# Patient Record
Sex: FEMALE | Race: WHITE | Employment: FULL TIME | URBAN - METROPOLITAN AREA
[De-identification: names, ages, dates, MRNs, and addresses within clinical notes are randomized per-mention and may not be internally consistent; named-entity substitution may affect disease eponyms.]

---

## 2018-08-13 ENCOUNTER — HOSPITAL ENCOUNTER (OUTPATIENT)
Dept: MAMMOGRAPHY | Age: 58
Discharge: HOME | End: 2018-08-13
Attending: INTERNAL MEDICINE
Payer: COMMERCIAL

## 2018-08-13 ENCOUNTER — HOSPITAL ENCOUNTER (OUTPATIENT)
Dept: BONE DENSITY | Age: 58
Discharge: HOME | End: 2018-08-13
Attending: INTERNAL MEDICINE
Payer: COMMERCIAL

## 2018-08-13 DIAGNOSIS — M85.80 OSTEOPENIA: ICD-10-CM

## 2018-08-13 DIAGNOSIS — Z12.39 SCREENING BREAST EXAMINATION: ICD-10-CM

## 2018-08-13 PROCEDURE — 77067 SCR MAMMO BI INCL CAD: CPT

## 2018-08-13 PROCEDURE — 77080 DXA BONE DENSITY AXIAL: CPT

## 2019-11-13 ENCOUNTER — HOSPITAL ENCOUNTER (OUTPATIENT)
Dept: MAMMOGRAPHY | Age: 59
Discharge: HOME | End: 2019-11-13
Payer: COMMERCIAL

## 2019-11-13 DIAGNOSIS — Z12.31 VISIT FOR SCREENING MAMMOGRAM: ICD-10-CM

## 2019-11-13 PROCEDURE — 77067 SCR MAMMO BI INCL CAD: CPT

## 2021-01-20 ENCOUNTER — TRANSCRIBE ORDER (OUTPATIENT)
Dept: SCHEDULING | Age: 61
End: 2021-01-20

## 2021-01-20 DIAGNOSIS — Z12.31 VISIT FOR SCREENING MAMMOGRAM: Primary | ICD-10-CM

## 2021-02-17 ENCOUNTER — HOSPITAL ENCOUNTER (OUTPATIENT)
Dept: MAMMOGRAPHY | Age: 61
Discharge: HOME | End: 2021-02-17
Payer: COMMERCIAL

## 2021-02-17 DIAGNOSIS — Z12.31 VISIT FOR SCREENING MAMMOGRAM: ICD-10-CM

## 2021-02-17 PROCEDURE — 77067 SCR MAMMO BI INCL CAD: CPT | Performed by: INTERNAL MEDICINE

## 2022-02-18 ENCOUNTER — TRANSCRIBE ORDER (OUTPATIENT)
Dept: SCHEDULING | Age: 62
End: 2022-02-18

## 2022-02-18 DIAGNOSIS — Z12.31 OTHER SCREENING MAMMOGRAM: Primary | ICD-10-CM

## 2022-04-12 ENCOUNTER — HOSPITAL ENCOUNTER (OUTPATIENT)
Dept: MAMMOGRAPHY | Age: 62
Discharge: HOME | End: 2022-04-12
Attending: INTERNAL MEDICINE
Payer: COMMERCIAL

## 2022-04-12 DIAGNOSIS — Z12.31 OTHER SCREENING MAMMOGRAM: ICD-10-CM

## 2022-04-12 PROCEDURE — 77063 BREAST TOMOSYNTHESIS BI: CPT

## 2023-04-18 ENCOUNTER — TRANSCRIBE ORDER (OUTPATIENT)
Dept: SCHEDULING | Age: 63
End: 2023-04-18

## 2023-04-18 DIAGNOSIS — Z12.31 VISIT FOR SCREENING MAMMOGRAM: Primary | ICD-10-CM

## 2023-04-20 ENCOUNTER — HOSPITAL ENCOUNTER (OUTPATIENT)
Dept: MAMMOGRAPHY | Age: 63
Discharge: HOME | End: 2023-04-20
Attending: INTERNAL MEDICINE
Payer: COMMERCIAL

## 2023-04-20 DIAGNOSIS — Z12.31 VISIT FOR SCREENING MAMMOGRAM: ICD-10-CM

## 2023-04-20 PROCEDURE — 77063 BREAST TOMOSYNTHESIS BI: CPT

## 2023-04-23 DIAGNOSIS — Z12.31 VISIT FOR SCREENING MAMMOGRAM: Primary | ICD-10-CM

## 2023-05-17 RX ORDER — MECLIZINE HYDROCHLORIDE 25 MG/1
25 TABLET ORAL 3 TIMES DAILY PRN
COMMUNITY
Start: 2016-09-06

## 2024-05-30 ENCOUNTER — TRANSCRIBE ORDERS (OUTPATIENT)
Dept: SCHEDULING | Age: 64
End: 2024-05-30

## 2024-05-30 DIAGNOSIS — Z12.31 SCREENING MAMMOGRAM, ENCOUNTER FOR: Primary | ICD-10-CM

## 2024-06-20 ENCOUNTER — HOSPITAL ENCOUNTER (OUTPATIENT)
Age: 64
Discharge: HOME | End: 2024-06-20
Payer: COMMERCIAL

## 2024-06-20 VITALS — BODY MASS INDEX: 24.29 KG/M2 | WEIGHT: 132 LBS | HEIGHT: 62 IN

## 2024-06-20 DIAGNOSIS — Z12.31 SCREENING MAMMOGRAM, ENCOUNTER FOR: ICD-10-CM

## 2024-06-20 PROCEDURE — 77067 SCR MAMMO BI INCL CAD: CPT

## 2024-07-02 ENCOUNTER — HOSPITAL ENCOUNTER (OUTPATIENT)
Facility: HOSPITAL | Age: 64
Discharge: HOME | End: 2024-07-05
Attending: FAMILY MEDICINE
Payer: COMMERCIAL

## 2024-07-02 ENCOUNTER — APPOINTMENT (OUTPATIENT)
Facility: HOSPITAL | Age: 64
DRG: 021 | End: 2024-07-02
Payer: COMMERCIAL

## 2024-07-02 ENCOUNTER — TRANSCRIBE ORDERS (OUTPATIENT)
Facility: HOSPITAL | Age: 64
End: 2024-07-02

## 2024-07-02 ENCOUNTER — HOSPITAL ENCOUNTER (INPATIENT)
Facility: HOSPITAL | Age: 64
LOS: 14 days | Discharge: HOME HEALTH | DRG: 021 | End: 2024-07-16
Attending: EMERGENCY MEDICINE | Admitting: INTERNAL MEDICINE
Payer: COMMERCIAL

## 2024-07-02 DIAGNOSIS — R03.0 ELEVATED BLOOD PRESSURE READING: ICD-10-CM

## 2024-07-02 DIAGNOSIS — I60.8 SUBARACHNOID HEMORRHAGE DUE TO RUPTURED ANEURYSM (HCC): ICD-10-CM

## 2024-07-02 DIAGNOSIS — G44.89 OTHER HEADACHE SYNDROME: Primary | ICD-10-CM

## 2024-07-02 DIAGNOSIS — I60.9 SAH (SUBARACHNOID HEMORRHAGE) (HCC): Primary | ICD-10-CM

## 2024-07-02 DIAGNOSIS — I16.1 HYPERTENSIVE EMERGENCY: ICD-10-CM

## 2024-07-02 DIAGNOSIS — G44.89 OTHER HEADACHE SYNDROME: ICD-10-CM

## 2024-07-02 LAB
ALBUMIN SERPL-MCNC: 4.2 G/DL (ref 3.5–5)
ALBUMIN/GLOB SERPL: 1.1 (ref 1.1–2.2)
ALP SERPL-CCNC: 100 U/L (ref 45–117)
ALT SERPL-CCNC: 19 U/L (ref 12–78)
ANION GAP SERPL CALC-SCNC: 11 MMOL/L (ref 5–15)
AST SERPL-CCNC: 18 U/L (ref 15–37)
BASOPHILS # BLD: 0 K/UL (ref 0–0.1)
BASOPHILS NFR BLD: 0 % (ref 0–1)
BILIRUB SERPL-MCNC: 0.5 MG/DL (ref 0.2–1)
BUN SERPL-MCNC: 13 MG/DL (ref 6–20)
BUN/CREAT SERPL: 16 (ref 12–20)
CALCIUM SERPL-MCNC: 9.3 MG/DL (ref 8.5–10.1)
CHLORIDE SERPL-SCNC: 91 MMOL/L (ref 97–108)
CO2 SERPL-SCNC: 28 MMOL/L (ref 21–32)
CREAT SERPL-MCNC: 0.81 MG/DL (ref 0.55–1.02)
DIFFERENTIAL METHOD BLD: NORMAL
EKG ATRIAL RATE: 93 BPM
EKG ATRIAL RATE: 94 BPM
EKG DIAGNOSIS: NORMAL
EKG DIAGNOSIS: NORMAL
EKG P AXIS: 37 DEGREES
EKG P AXIS: 60 DEGREES
EKG P-R INTERVAL: 122 MS
EKG P-R INTERVAL: 148 MS
EKG Q-T INTERVAL: 364 MS
EKG Q-T INTERVAL: 372 MS
EKG QRS DURATION: 70 MS
EKG QRS DURATION: 74 MS
EKG QTC CALCULATION (BAZETT): 452 MS
EKG QTC CALCULATION (BAZETT): 465 MS
EKG R AXIS: 44 DEGREES
EKG R AXIS: 53 DEGREES
EKG T AXIS: 55 DEGREES
EKG T AXIS: 63 DEGREES
EKG VENTRICULAR RATE: 93 BPM
EKG VENTRICULAR RATE: 94 BPM
EOSINOPHIL # BLD: 0 K/UL (ref 0–0.4)
EOSINOPHIL NFR BLD: 0 % (ref 0–7)
ERYTHROCYTE [DISTWIDTH] IN BLOOD BY AUTOMATED COUNT: 12.6 % (ref 11.5–14.5)
GLOBULIN SER CALC-MCNC: 4 G/DL (ref 2–4)
GLUCOSE BLD STRIP.AUTO-MCNC: 94 MG/DL (ref 65–117)
GLUCOSE SERPL-MCNC: 110 MG/DL (ref 65–100)
HCT VFR BLD AUTO: 42.9 % (ref 35–47)
HGB BLD-MCNC: 14.8 G/DL (ref 11.5–16)
IMM GRANULOCYTES # BLD AUTO: 0 K/UL (ref 0–0.04)
IMM GRANULOCYTES NFR BLD AUTO: 0 % (ref 0–0.5)
INR PPP: 1 (ref 0.9–1.1)
LYMPHOCYTES # BLD: 1.6 K/UL (ref 0.8–3.5)
LYMPHOCYTES NFR BLD: 22 % (ref 12–49)
MCH RBC QN AUTO: 32.4 PG (ref 26–34)
MCHC RBC AUTO-ENTMCNC: 34.5 G/DL (ref 30–36.5)
MCV RBC AUTO: 93.9 FL (ref 80–99)
MONOCYTES # BLD: 1 K/UL (ref 0–1)
MONOCYTES NFR BLD: 13 % (ref 5–13)
NEUTS SEG # BLD: 4.5 K/UL (ref 1.8–8)
NEUTS SEG NFR BLD: 65 % (ref 32–75)
NRBC # BLD: 0 K/UL (ref 0–0.01)
NRBC BLD-RTO: 0 PER 100 WBC
PLATELET # BLD AUTO: 380 K/UL (ref 150–400)
PMV BLD AUTO: 9.7 FL (ref 8.9–12.9)
POTASSIUM SERPL-SCNC: 3.2 MMOL/L (ref 3.5–5.1)
PROT SERPL-MCNC: 8.2 G/DL (ref 6.4–8.2)
PROTHROMBIN TIME: 9.5 SEC (ref 9–11.1)
RBC # BLD AUTO: 4.57 M/UL (ref 3.8–5.2)
SERVICE CMNT-IMP: NORMAL
SODIUM SERPL-SCNC: 130 MMOL/L (ref 136–145)
TROPONIN I SERPL HS-MCNC: 60 NG/L (ref 0–51)
WBC # BLD AUTO: 7.1 K/UL (ref 3.6–11)

## 2024-07-02 PROCEDURE — 4A03X5D MEASUREMENT OF ARTERIAL FLOW, INTRACRANIAL, EXTERNAL APPROACH: ICD-10-PCS | Performed by: RADIOLOGY

## 2024-07-02 PROCEDURE — 99291 CRITICAL CARE FIRST HOUR: CPT

## 2024-07-02 PROCEDURE — 6360000002 HC RX W HCPCS: Performed by: INTERNAL MEDICINE

## 2024-07-02 PROCEDURE — 2000000000 HC ICU R&B

## 2024-07-02 PROCEDURE — 70470 CT HEAD/BRAIN W/O & W/DYE: CPT

## 2024-07-02 PROCEDURE — 82962 GLUCOSE BLOOD TEST: CPT

## 2024-07-02 PROCEDURE — 93005 ELECTROCARDIOGRAM TRACING: CPT | Performed by: EMERGENCY MEDICINE

## 2024-07-02 PROCEDURE — 93005 ELECTROCARDIOGRAM TRACING: CPT | Performed by: INTERNAL MEDICINE

## 2024-07-02 PROCEDURE — 6360000002 HC RX W HCPCS

## 2024-07-02 PROCEDURE — 96374 THER/PROPH/DIAG INJ IV PUSH: CPT

## 2024-07-02 PROCEDURE — 6370000000 HC RX 637 (ALT 250 FOR IP): Performed by: INTERNAL MEDICINE

## 2024-07-02 PROCEDURE — 70496 CT ANGIOGRAPHY HEAD: CPT

## 2024-07-02 PROCEDURE — 36415 COLL VENOUS BLD VENIPUNCTURE: CPT

## 2024-07-02 PROCEDURE — 2580000003 HC RX 258: Performed by: EMERGENCY MEDICINE

## 2024-07-02 PROCEDURE — 6360000004 HC RX CONTRAST MEDICATION: Performed by: EMERGENCY MEDICINE

## 2024-07-02 PROCEDURE — 6360000004 HC RX CONTRAST MEDICATION: Performed by: FAMILY MEDICINE

## 2024-07-02 PROCEDURE — 85610 PROTHROMBIN TIME: CPT

## 2024-07-02 PROCEDURE — 96375 TX/PRO/DX INJ NEW DRUG ADDON: CPT

## 2024-07-02 PROCEDURE — 2580000003 HC RX 258: Performed by: INTERNAL MEDICINE

## 2024-07-02 PROCEDURE — 80053 COMPREHEN METABOLIC PANEL: CPT

## 2024-07-02 PROCEDURE — 85025 COMPLETE CBC W/AUTO DIFF WBC: CPT

## 2024-07-02 PROCEDURE — 84484 ASSAY OF TROPONIN QUANT: CPT

## 2024-07-02 PROCEDURE — 6360000002 HC RX W HCPCS: Performed by: EMERGENCY MEDICINE

## 2024-07-02 RX ORDER — BUTALBITAL, ACETAMINOPHEN AND CAFFEINE 50; 325; 40 MG/1; MG/1; MG/1
1 TABLET ORAL EVERY 4 HOURS PRN
Status: DISCONTINUED | OUTPATIENT
Start: 2024-07-02 | End: 2024-07-06

## 2024-07-02 RX ORDER — VENLAFAXINE HYDROCHLORIDE 150 MG/1
150 CAPSULE, EXTENDED RELEASE ORAL DAILY
COMMUNITY

## 2024-07-02 RX ORDER — SODIUM CHLORIDE 0.9 % (FLUSH) 0.9 %
5-40 SYRINGE (ML) INJECTION EVERY 12 HOURS SCHEDULED
Status: DISCONTINUED | OUTPATIENT
Start: 2024-07-02 | End: 2024-07-15

## 2024-07-02 RX ORDER — LEVETIRACETAM 500 MG/5ML
500 INJECTION, SOLUTION, CONCENTRATE INTRAVENOUS EVERY 12 HOURS
Status: DISCONTINUED | OUTPATIENT
Start: 2024-07-03 | End: 2024-07-05

## 2024-07-02 RX ORDER — NIMODIPINE 30 MG/1
60 CAPSULE, LIQUID FILLED ORAL
Status: DISCONTINUED | OUTPATIENT
Start: 2024-07-02 | End: 2024-07-16 | Stop reason: HOSPADM

## 2024-07-02 RX ORDER — SODIUM CHLORIDE 9 MG/ML
INJECTION, SOLUTION INTRAVENOUS PRN
Status: DISCONTINUED | OUTPATIENT
Start: 2024-07-02 | End: 2024-07-16 | Stop reason: HOSPADM

## 2024-07-02 RX ORDER — LABETALOL HYDROCHLORIDE 5 MG/ML
10 INJECTION, SOLUTION INTRAVENOUS EVERY 4 HOURS PRN
Status: DISCONTINUED | OUTPATIENT
Start: 2024-07-02 | End: 2024-07-05

## 2024-07-02 RX ORDER — VENLAFAXINE HYDROCHLORIDE 150 MG/1
150 CAPSULE, EXTENDED RELEASE ORAL DAILY
Status: DISCONTINUED | OUTPATIENT
Start: 2024-07-03 | End: 2024-07-16 | Stop reason: HOSPADM

## 2024-07-02 RX ORDER — ONDANSETRON 2 MG/ML
4 INJECTION INTRAMUSCULAR; INTRAVENOUS EVERY 6 HOURS PRN
Status: DISCONTINUED | OUTPATIENT
Start: 2024-07-02 | End: 2024-07-16 | Stop reason: HOSPADM

## 2024-07-02 RX ORDER — HYDRALAZINE HYDROCHLORIDE 20 MG/ML
10 INJECTION INTRAMUSCULAR; INTRAVENOUS EVERY 6 HOURS PRN
Status: DISCONTINUED | OUTPATIENT
Start: 2024-07-02 | End: 2024-07-05

## 2024-07-02 RX ORDER — ONDANSETRON 4 MG/1
4 TABLET, ORALLY DISINTEGRATING ORAL EVERY 8 HOURS PRN
Status: DISCONTINUED | OUTPATIENT
Start: 2024-07-02 | End: 2024-07-16 | Stop reason: HOSPADM

## 2024-07-02 RX ORDER — LABETALOL HYDROCHLORIDE 5 MG/ML
10 INJECTION, SOLUTION INTRAVENOUS ONCE
Status: COMPLETED | OUTPATIENT
Start: 2024-07-02 | End: 2024-07-02

## 2024-07-02 RX ORDER — ACETAMINOPHEN 325 MG/1
650 TABLET ORAL EVERY 4 HOURS PRN
Status: DISCONTINUED | OUTPATIENT
Start: 2024-07-02 | End: 2024-07-16 | Stop reason: HOSPADM

## 2024-07-02 RX ORDER — MAGNESIUM SULFATE 1 G/100ML
1000 INJECTION INTRAVENOUS PRN
Status: DISCONTINUED | OUTPATIENT
Start: 2024-07-02 | End: 2024-07-16 | Stop reason: HOSPADM

## 2024-07-02 RX ORDER — SODIUM CHLORIDE 0.9 % (FLUSH) 0.9 %
5-40 SYRINGE (ML) INJECTION PRN
Status: DISCONTINUED | OUTPATIENT
Start: 2024-07-02 | End: 2024-07-16 | Stop reason: HOSPADM

## 2024-07-02 RX ORDER — METOCLOPRAMIDE HYDROCHLORIDE 5 MG/ML
10 INJECTION INTRAMUSCULAR; INTRAVENOUS EVERY 6 HOURS PRN
Status: DISCONTINUED | OUTPATIENT
Start: 2024-07-02 | End: 2024-07-16 | Stop reason: HOSPADM

## 2024-07-02 RX ORDER — LEVETIRACETAM 500 MG/5ML
1000 INJECTION, SOLUTION, CONCENTRATE INTRAVENOUS
Status: COMPLETED | OUTPATIENT
Start: 2024-07-02 | End: 2024-07-02

## 2024-07-02 RX ADMIN — NIMODIPINE 60 MG: 30 CAPSULE, LIQUID FILLED ORAL at 21:16

## 2024-07-02 RX ADMIN — ACETAMINOPHEN 650 MG: 325 TABLET ORAL at 21:16

## 2024-07-02 RX ADMIN — IOPAMIDOL 100 ML: 755 INJECTION, SOLUTION INTRAVENOUS at 17:28

## 2024-07-02 RX ADMIN — IOPAMIDOL 100 ML: 755 INJECTION, SOLUTION INTRAVENOUS at 15:57

## 2024-07-02 RX ADMIN — LABETALOL HYDROCHLORIDE 10 MG: 5 INJECTION INTRAVENOUS at 17:42

## 2024-07-02 RX ADMIN — SODIUM CHLORIDE, PRESERVATIVE FREE 10 ML: 5 INJECTION INTRAVENOUS at 21:12

## 2024-07-02 RX ADMIN — SODIUM CHLORIDE 7.5 MG/HR: 9 INJECTION, SOLUTION INTRAVENOUS at 21:11

## 2024-07-02 RX ADMIN — NIMODIPINE 60 MG: 30 CAPSULE, LIQUID FILLED ORAL at 23:11

## 2024-07-02 RX ADMIN — METOCLOPRAMIDE 10 MG: 5 INJECTION, SOLUTION INTRAMUSCULAR; INTRAVENOUS at 23:11

## 2024-07-02 RX ADMIN — LEVETIRACETAM 1000 MG: 100 INJECTION, SOLUTION INTRAVENOUS at 17:37

## 2024-07-02 RX ADMIN — SODIUM CHLORIDE 5 MG/HR: 9 INJECTION, SOLUTION INTRAVENOUS at 17:58

## 2024-07-02 ASSESSMENT — PAIN DESCRIPTION - ORIENTATION: ORIENTATION: POSTERIOR

## 2024-07-02 ASSESSMENT — PAIN SCALES - GENERAL
PAINLEVEL_OUTOF10: 4
PAINLEVEL_OUTOF10: 6

## 2024-07-02 ASSESSMENT — PAIN DESCRIPTION - LOCATION
LOCATION: HEAD
LOCATION: HEAD

## 2024-07-02 ASSESSMENT — PAIN DESCRIPTION - DESCRIPTORS: DESCRIPTORS: ACHING

## 2024-07-02 NOTE — ED PROVIDER NOTES
[facility]  EMERGENCY DEPARTMENT ENCOUNTER      Pt Name: Gaby Stroud  MRN: [id number]  Birthdate 1960  Date of evaluation: 2024  Provider: Hawk Dixon DO      HISTORY OF PRESENT ILLNESS      HPI    64-year-old female presents to the emergency department from outpatient imaging where she was sent by her PCP for head CT.  She reportedly had the onset of a severe headache with associated nausea and vomiting on .  She states that she has been taking Excedrin migraine for her symptoms since with improvement in her symptoms but still with a persistent headache.  She has noted some blurred vision but denies any focal numbness, weakness, difficulty speaking or walking.  She is on no blood thinners.  No head trauma.  She was found to have a subarachnoid hemorrhage on outpatient CT and was brought to the ED for further evaluation immediately after this image was obtained.    Nursing Notes were reviewed.    REVIEW OF SYSTEMS         Review of Systems        PAST MEDICAL HISTORY     Past Medical History:   Diagnosis Date    Psychiatric disorder     depresssion          SURGICAL HISTORY       Past Surgical History:   Procedure Laterality Date    BREAST BIOPSY Right     neg    BREAST BIOPSY Left     X 3; all neg    GYN               CURRENT MEDICATIONS       Previous Medications    MECLIZINE (ANTIVERT) 25 MG TABLET    Take 1 tablet by mouth 3 times daily as needed    VENLAFAXINE (EFFEXOR XR) 150 MG EXTENDED RELEASE CAPSULE    Take 1 capsule by mouth daily       ALLERGIES     Codeine and Sulfa antibiotics    FAMILY HISTORY       Family History   Problem Relation Age of Onset    Breast Cancer Mother 63        X 2          SOCIAL HISTORY       Social History     Socioeconomic History    Marital status:    Tobacco Use    Smoking status: Former   Substance and Sexual Activity    Alcohol use: Yes    Drug use: No         PHYSICAL EXAM       ED Triage Vitals   BP Temp Temp src Pulse Resp

## 2024-07-02 NOTE — ED NOTES
1708 Emergency line called for code stroke level 2 VAN NEGATIVE   Tele neuro paged    3756 tele neuro called back and speaking with dr. Dixon on the phone

## 2024-07-02 NOTE — ED NOTES
Neuro interventional surgery consulted. Dr. Stokes speaking with dr. Dixon on the phone at this time.

## 2024-07-02 NOTE — ED TRIAGE NOTES
Pt c/o HA and vomitting that started on Sunday. Pt states that her Ha is better today because she took her Ecedrin Migraine at 0700 this morning. Pt sent from CT for SAH. Pt A&Ox4, states that her legs felt a bit uncoordinated this morning; currently ambulating with steady gait. Pt also endorses blurry vision at work all day. Dr. Dixon at bedside.

## 2024-07-02 NOTE — ED NOTES
TRANSFER - OUT REPORT:    Verbal report given to HERRERA Martinez on Gaby Stroud  being transferred to HERRERA Berry for routine progression of patient care       Report consisted of patient's Situation, Background, Assessment and   Recommendations(SBAR).     Information from the following report(s) ED Encounter Summary, ED SBAR, MAR, Recent Results, Cardiac Rhythm NSR, and Neuro Assessment was reviewed with the receiving nurse.    Tracy Fall Assessment:    Presents to emergency department  because of falls (Syncope, seizure, or loss of consciousness): No  Age > 70: No  Altered Mental Status, Intoxication with alcohol or substance confusion (Disorientation, impaired judgment, poor safety awaremess, or inability to follow instructions): No  Impaired Mobility: Ambulates or transfers with assistive devices or assistance; Unable to ambulate or transer.: No  Nursing Judgement: No          Lines:   Peripheral IV 07/02/24 Right Antecubital (Active)   Site Assessment Clean, dry & intact 07/02/24 1709   Line Status Blood return noted 07/02/24 1709   Phlebitis Assessment No symptoms 07/02/24 1709   Infiltration Assessment 0 07/02/24 1709   Dressing Status Clean, dry & intact 07/02/24 1709   Dressing Type Transparent 07/02/24 1709       Peripheral IV 07/02/24 Left Antecubital (Active)   Site Assessment Clean, dry & intact 07/02/24 1754        Opportunity for questions and clarification was provided.      Patient transported with:  Monitor

## 2024-07-03 ENCOUNTER — APPOINTMENT (OUTPATIENT)
Facility: HOSPITAL | Age: 64
DRG: 021 | End: 2024-07-03
Payer: COMMERCIAL

## 2024-07-03 ENCOUNTER — ANESTHESIA (OUTPATIENT)
Facility: HOSPITAL | Age: 64
End: 2024-07-03
Payer: COMMERCIAL

## 2024-07-03 ENCOUNTER — ANESTHESIA EVENT (OUTPATIENT)
Facility: HOSPITAL | Age: 64
End: 2024-07-03
Payer: COMMERCIAL

## 2024-07-03 ENCOUNTER — APPOINTMENT (OUTPATIENT)
Facility: HOSPITAL | Age: 64
DRG: 021 | End: 2024-07-03
Attending: INTERNAL MEDICINE
Payer: COMMERCIAL

## 2024-07-03 PROBLEM — I60.9 SAH (SUBARACHNOID HEMORRHAGE) (HCC): Status: ACTIVE | Noted: 2024-07-03

## 2024-07-03 LAB
ACT BLD: 140 SECS (ref 79–138)
ACT BLD: 238 SECS (ref 79–138)
ANION GAP SERPL CALC-SCNC: 10 MMOL/L (ref 5–15)
BUN SERPL-MCNC: 10 MG/DL (ref 6–20)
BUN/CREAT SERPL: 16 (ref 12–20)
CALCIUM SERPL-MCNC: 9 MG/DL (ref 8.5–10.1)
CHLORIDE SERPL-SCNC: 99 MMOL/L (ref 97–108)
CHOLEST SERPL-MCNC: 204 MG/DL
CO2 SERPL-SCNC: 23 MMOL/L (ref 21–32)
CREAT SERPL-MCNC: 0.64 MG/DL (ref 0.55–1.02)
ECHO BSA: 1.62 M2
ERYTHROCYTE [DISTWIDTH] IN BLOOD BY AUTOMATED COUNT: 12.7 % (ref 11.5–14.5)
GLUCOSE SERPL-MCNC: 111 MG/DL (ref 65–100)
HCT VFR BLD AUTO: 39.5 % (ref 35–47)
HDLC SERPL-MCNC: 92 MG/DL
HDLC SERPL: 2.2 (ref 0–5)
HGB BLD-MCNC: 13.8 G/DL (ref 11.5–16)
LDLC SERPL CALC-MCNC: 100.8 MG/DL (ref 0–100)
MAGNESIUM SERPL-MCNC: 2.1 MG/DL (ref 1.6–2.4)
MCH RBC QN AUTO: 32.7 PG (ref 26–34)
MCHC RBC AUTO-ENTMCNC: 34.9 G/DL (ref 30–36.5)
MCV RBC AUTO: 93.6 FL (ref 80–99)
NRBC # BLD: 0 K/UL (ref 0–0.01)
NRBC BLD-RTO: 0 PER 100 WBC
PLATELET # BLD AUTO: 328 K/UL (ref 150–400)
PMV BLD AUTO: 9.5 FL (ref 8.9–12.9)
POTASSIUM SERPL-SCNC: 3.2 MMOL/L (ref 3.5–5.1)
RBC # BLD AUTO: 4.22 M/UL (ref 3.8–5.2)
SODIUM SERPL-SCNC: 132 MMOL/L (ref 136–145)
TRIGL SERPL-MCNC: 56 MG/DL
TROPONIN I SERPL HS-MCNC: 62 NG/L (ref 0–51)
VAS LEFT EX ICA MEAN VEL: 38 CM/S
VAS LEFT ICA DIST EDV: 21 CM/S
VAS LEFT ICA DIST PSV: 71.5 CM/S
VAS LEFT PCA 1 EDV: 14.5 CM/S
VAS LEFT PCA 1 MEAN VEL: 25.1 CM/S
VAS LEFT PCA 1 PSV: 46.3 CM/S
VAS RIGHT EX ICA MEAN VEL: 34 CM/S
VAS RIGHT ICA DIST EDV: 20.1 CM/S
VAS RIGHT ICA DIST PSV: 61.5 CM/S
VAS RIGHT LINDEGAARD RATIO: 0.9
VAS RIGHT MCA 1 EDV: 18.8 CM/S
VAS RIGHT MCA 1 MEAN VEL: 32.2 CM/S
VAS RIGHT MCA 1 PSV: 58.9 CM/S
VAS RIGHT PCA 1 EDV: 15.1 CM/S
VAS RIGHT PCA 1 MEAN VEL: 23.9 CM/S
VAS RIGHT PCA 1 PSV: 41.5 CM/S
VAS RIGHT VERTEBRAL EDV TCD: 12 CM/S
VAS RIGHT VERTEBRAL MEAN VEL: 21.3 CM/S
VAS RIGHT VERTEBRAL PSV TCD: 40 CM/S
VLDLC SERPL CALC-MCNC: 11.2 MG/DL
WBC # BLD AUTO: 8.9 K/UL (ref 3.6–11)

## 2024-07-03 PROCEDURE — 2500000003 HC RX 250 WO HCPCS: Performed by: NURSE ANESTHETIST, CERTIFIED REGISTERED

## 2024-07-03 PROCEDURE — 80048 BASIC METABOLIC PNL TOTAL CA: CPT

## 2024-07-03 PROCEDURE — C1889 IMPLANT/INSERT DEVICE, NOC: HCPCS | Performed by: RADIOLOGY

## 2024-07-03 PROCEDURE — 3700000000 HC ANESTHESIA ATTENDED CARE: Performed by: RADIOLOGY

## 2024-07-03 PROCEDURE — 03HY32Z INSERTION OF MONITORING DEVICE INTO UPPER ARTERY, PERCUTANEOUS APPROACH: ICD-10-PCS | Performed by: STUDENT IN AN ORGANIZED HEALTH CARE EDUCATION/TRAINING PROGRAM

## 2024-07-03 PROCEDURE — 2580000003 HC RX 258: Performed by: NURSE ANESTHETIST, CERTIFIED REGISTERED

## 2024-07-03 PROCEDURE — 2580000003 HC RX 258: Performed by: INTERNAL MEDICINE

## 2024-07-03 PROCEDURE — APPNB30 APP NON BILLABLE TIME 0-30 MINS: Performed by: NURSE PRACTITIONER

## 2024-07-03 PROCEDURE — 6370000000 HC RX 637 (ALT 250 FOR IP)

## 2024-07-03 PROCEDURE — 3700000001 HC ADD 15 MINUTES (ANESTHESIA): Performed by: RADIOLOGY

## 2024-07-03 PROCEDURE — 6360000002 HC RX W HCPCS: Performed by: INTERNAL MEDICINE

## 2024-07-03 PROCEDURE — 03LG3DZ OCCLUSION OF INTRACRANIAL ARTERY WITH INTRALUMINAL DEVICE, PERCUTANEOUS APPROACH: ICD-10-PCS | Performed by: RADIOLOGY

## 2024-07-03 PROCEDURE — 36415 COLL VENOUS BLD VENIPUNCTURE: CPT

## 2024-07-03 PROCEDURE — 85347 COAGULATION TIME ACTIVATED: CPT

## 2024-07-03 PROCEDURE — 70450 CT HEAD/BRAIN W/O DYE: CPT

## 2024-07-03 PROCEDURE — 6360000002 HC RX W HCPCS

## 2024-07-03 PROCEDURE — 2580000003 HC RX 258

## 2024-07-03 PROCEDURE — 6360000002 HC RX W HCPCS: Performed by: RADIOLOGY

## 2024-07-03 PROCEDURE — 6370000000 HC RX 637 (ALT 250 FOR IP): Performed by: NURSE PRACTITIONER

## 2024-07-03 PROCEDURE — 2000000000 HC ICU R&B

## 2024-07-03 PROCEDURE — 93886 INTRACRANIAL COMPLETE STUDY: CPT

## 2024-07-03 PROCEDURE — 85027 COMPLETE CBC AUTOMATED: CPT

## 2024-07-03 PROCEDURE — 6360000002 HC RX W HCPCS: Performed by: NURSE ANESTHETIST, CERTIFIED REGISTERED

## 2024-07-03 PROCEDURE — 51798 US URINE CAPACITY MEASURE: CPT

## 2024-07-03 PROCEDURE — 84484 ASSAY OF TROPONIN QUANT: CPT

## 2024-07-03 PROCEDURE — 75898 FOLLOW-UP ANGIOGRAPHY: CPT

## 2024-07-03 PROCEDURE — 36620 INSERTION CATHETER ARTERY: CPT

## 2024-07-03 PROCEDURE — 6370000000 HC RX 637 (ALT 250 FOR IP): Performed by: INTERNAL MEDICINE

## 2024-07-03 PROCEDURE — 2500000003 HC RX 250 WO HCPCS: Performed by: RADIOLOGY

## 2024-07-03 PROCEDURE — 6360000004 HC RX CONTRAST MEDICATION: Performed by: RADIOLOGY

## 2024-07-03 PROCEDURE — 83735 ASSAY OF MAGNESIUM: CPT

## 2024-07-03 PROCEDURE — 2580000003 HC RX 258: Performed by: RADIOLOGY

## 2024-07-03 PROCEDURE — 80061 LIPID PANEL: CPT

## 2024-07-03 RX ORDER — DEXMEDETOMIDINE HYDROCHLORIDE 100 UG/ML
INJECTION, SOLUTION INTRAVENOUS PRN
Status: DISCONTINUED | OUTPATIENT
Start: 2024-07-03 | End: 2024-07-03 | Stop reason: SDUPTHER

## 2024-07-03 RX ORDER — SODIUM CHLORIDE 9 MG/ML
INJECTION, SOLUTION INTRAVENOUS CONTINUOUS
Status: DISCONTINUED | OUTPATIENT
Start: 2024-07-03 | End: 2024-07-04

## 2024-07-03 RX ORDER — GLYCOPYRROLATE 0.2 MG/ML
INJECTION INTRAMUSCULAR; INTRAVENOUS PRN
Status: DISCONTINUED | OUTPATIENT
Start: 2024-07-03 | End: 2024-07-03 | Stop reason: SDUPTHER

## 2024-07-03 RX ORDER — HEPARIN SODIUM 1000 [USP'U]/ML
INJECTION, SOLUTION INTRAVENOUS; SUBCUTANEOUS PRN
Status: DISCONTINUED | OUTPATIENT
Start: 2024-07-03 | End: 2024-07-03 | Stop reason: SDUPTHER

## 2024-07-03 RX ORDER — ROCURONIUM BROMIDE 10 MG/ML
INJECTION, SOLUTION INTRAVENOUS PRN
Status: DISCONTINUED | OUTPATIENT
Start: 2024-07-03 | End: 2024-07-03 | Stop reason: SDUPTHER

## 2024-07-03 RX ORDER — SUCCINYLCHOLINE CHLORIDE 20 MG/ML
INJECTION INTRAMUSCULAR; INTRAVENOUS PRN
Status: DISCONTINUED | OUTPATIENT
Start: 2024-07-03 | End: 2024-07-03 | Stop reason: SDUPTHER

## 2024-07-03 RX ORDER — LIDOCAINE HYDROCHLORIDE 20 MG/ML
INJECTION, SOLUTION EPIDURAL; INFILTRATION; INTRACAUDAL; PERINEURAL PRN
Status: DISCONTINUED | OUTPATIENT
Start: 2024-07-03 | End: 2024-07-03 | Stop reason: SDUPTHER

## 2024-07-03 RX ORDER — SODIUM CHLORIDE, SODIUM LACTATE, POTASSIUM CHLORIDE, CALCIUM CHLORIDE 600; 310; 30; 20 MG/100ML; MG/100ML; MG/100ML; MG/100ML
INJECTION, SOLUTION INTRAVENOUS CONTINUOUS
Status: DISCONTINUED | OUTPATIENT
Start: 2024-07-03 | End: 2024-07-03

## 2024-07-03 RX ORDER — 0.9 % SODIUM CHLORIDE 0.9 %
INTRAVENOUS SOLUTION INTRAVENOUS PRN
Status: DISCONTINUED | OUTPATIENT
Start: 2024-07-03 | End: 2024-07-03 | Stop reason: SDUPTHER

## 2024-07-03 RX ORDER — LIDOCAINE HYDROCHLORIDE 10 MG/ML
INJECTION, SOLUTION EPIDURAL; INFILTRATION; INTRACAUDAL; PERINEURAL PRN
Status: COMPLETED | OUTPATIENT
Start: 2024-07-03 | End: 2024-07-03

## 2024-07-03 RX ADMIN — HEPARIN SODIUM 100 ML: 1000 INJECTION INTRAVENOUS; SUBCUTANEOUS at 11:50

## 2024-07-03 RX ADMIN — IOHEXOL 78 ML: 300 INJECTION, SOLUTION INTRAVENOUS at 11:50

## 2024-07-03 RX ADMIN — PROPOFOL 50 MG: 10 INJECTION, EMULSION INTRAVENOUS at 10:44

## 2024-07-03 RX ADMIN — HEPARIN SODIUM 100 ML: 1000 INJECTION INTRAVENOUS; SUBCUTANEOUS at 11:56

## 2024-07-03 RX ADMIN — NIMODIPINE 60 MG: 30 CAPSULE, LIQUID FILLED ORAL at 17:01

## 2024-07-03 RX ADMIN — LEVETIRACETAM 500 MG: 100 INJECTION INTRAVENOUS at 17:27

## 2024-07-03 RX ADMIN — Medication 100 ML: at 10:47

## 2024-07-03 RX ADMIN — PROPOFOL 150 MG: 10 INJECTION, EMULSION INTRAVENOUS at 10:37

## 2024-07-03 RX ADMIN — SODIUM CHLORIDE 500 ML: 9 INJECTION, SOLUTION INTRAVENOUS at 10:25

## 2024-07-03 RX ADMIN — NIMODIPINE 60 MG: 30 CAPSULE, LIQUID FILLED ORAL at 19:43

## 2024-07-03 RX ADMIN — VENLAFAXINE HYDROCHLORIDE 150 MG: 150 CAPSULE, EXTENDED RELEASE ORAL at 09:12

## 2024-07-03 RX ADMIN — SODIUM CHLORIDE, PRESERVATIVE FREE 10 ML: 5 INJECTION INTRAVENOUS at 19:47

## 2024-07-03 RX ADMIN — HEPARIN SODIUM 100 ML: 1000 INJECTION INTRAVENOUS; SUBCUTANEOUS at 11:52

## 2024-07-03 RX ADMIN — SODIUM CHLORIDE: 9 INJECTION, SOLUTION INTRAVENOUS at 05:52

## 2024-07-03 RX ADMIN — SODIUM CHLORIDE: 9 INJECTION, SOLUTION INTRAVENOUS at 22:56

## 2024-07-03 RX ADMIN — NIMODIPINE 60 MG: 30 CAPSULE, LIQUID FILLED ORAL at 23:27

## 2024-07-03 RX ADMIN — HEPARIN SODIUM 100 ML: 1000 INJECTION INTRAVENOUS; SUBCUTANEOUS at 11:55

## 2024-07-03 RX ADMIN — SODIUM CHLORIDE: 9 INJECTION, SOLUTION INTRAVENOUS at 13:30

## 2024-07-03 RX ADMIN — BUTALBITAL, ACETAMINOPHEN, AND CAFFEINE 1 TABLET: 50; 325; 40 TABLET ORAL at 23:27

## 2024-07-03 RX ADMIN — SUCCINYLCHOLINE CHLORIDE 150 MG: 20 INJECTION, SOLUTION INTRAMUSCULAR; INTRAVENOUS at 10:37

## 2024-07-03 RX ADMIN — ACETAMINOPHEN 650 MG: 325 TABLET ORAL at 04:00

## 2024-07-03 RX ADMIN — LIDOCAINE HYDROCHLORIDE 6 ML: 10 INJECTION, SOLUTION EPIDURAL; INFILTRATION; INTRACAUDAL; PERINEURAL at 11:19

## 2024-07-03 RX ADMIN — NIMODIPINE 60 MG: 30 CAPSULE, LIQUID FILLED ORAL at 09:13

## 2024-07-03 RX ADMIN — BUTALBITAL, ACETAMINOPHEN, AND CAFFEINE 1 TABLET: 50; 325; 40 TABLET ORAL at 05:48

## 2024-07-03 RX ADMIN — PHENYLEPHRINE HYDROCHLORIDE 110 MCG: 10 INJECTION INTRAVENOUS at 10:53

## 2024-07-03 RX ADMIN — SODIUM CHLORIDE 5 MG/HR: 9 INJECTION, SOLUTION INTRAVENOUS at 12:19

## 2024-07-03 RX ADMIN — DEXMEDETOMIDINE HYDROCHLORIDE 5 MCG: 100 INJECTION, SOLUTION, CONCENTRATE INTRAVENOUS at 10:59

## 2024-07-03 RX ADMIN — PROPOFOL 100 MG: 10 INJECTION, EMULSION INTRAVENOUS at 10:50

## 2024-07-03 RX ADMIN — HEPARIN SODIUM 2000 UNITS: 1000 INJECTION, SOLUTION INTRAVENOUS; SUBCUTANEOUS at 11:56

## 2024-07-03 RX ADMIN — LEVETIRACETAM 500 MG: 100 INJECTION INTRAVENOUS at 05:48

## 2024-07-03 RX ADMIN — HEPARIN SODIUM 100 ML: 1000 INJECTION INTRAVENOUS; SUBCUTANEOUS at 11:51

## 2024-07-03 RX ADMIN — LABETALOL HYDROCHLORIDE 10 MG: 5 INJECTION INTRAVENOUS at 14:16

## 2024-07-03 RX ADMIN — NIMODIPINE 60 MG: 30 CAPSULE, LIQUID FILLED ORAL at 04:00

## 2024-07-03 RX ADMIN — NIMODIPINE 60 MG: 30 CAPSULE, LIQUID FILLED ORAL at 13:49

## 2024-07-03 RX ADMIN — LIDOCAINE HYDROCHLORIDE 60 MG: 20 INJECTION, SOLUTION EPIDURAL; INFILTRATION; INTRACAUDAL; PERINEURAL at 10:37

## 2024-07-03 RX ADMIN — HEPARIN SODIUM 100 ML: 1000 INJECTION INTRAVENOUS; SUBCUTANEOUS at 11:54

## 2024-07-03 RX ADMIN — HYDRALAZINE HYDROCHLORIDE 10 MG: 20 INJECTION INTRAMUSCULAR; INTRAVENOUS at 15:42

## 2024-07-03 RX ADMIN — SODIUM CHLORIDE, PRESERVATIVE FREE 10 ML: 5 INJECTION INTRAVENOUS at 09:13

## 2024-07-03 RX ADMIN — GLYCOPYRROLATE 0.2 MG: 0.2 INJECTION INTRAMUSCULAR; INTRAVENOUS at 11:23

## 2024-07-03 RX ADMIN — HEPARIN SODIUM 2000 UNITS: 1000 INJECTION, SOLUTION INTRAVENOUS; SUBCUTANEOUS at 11:49

## 2024-07-03 RX ADMIN — PHENYLEPHRINE HYDROCHLORIDE 50 MCG/MIN: 10 INJECTION INTRAVENOUS at 10:52

## 2024-07-03 RX ADMIN — ROCURONIUM BROMIDE 5 MG: 10 SOLUTION INTRAVENOUS at 10:37

## 2024-07-03 RX ADMIN — BUTALBITAL, ACETAMINOPHEN, AND CAFFEINE 1 TABLET: 50; 325; 40 TABLET ORAL at 19:06

## 2024-07-03 ASSESSMENT — PAIN DESCRIPTION - DESCRIPTORS
DESCRIPTORS: DULL

## 2024-07-03 ASSESSMENT — PAIN SCALES - GENERAL
PAINLEVEL_OUTOF10: 2
PAINLEVEL_OUTOF10: 2
PAINLEVEL_OUTOF10: 1
PAINLEVEL_OUTOF10: 4
PAINLEVEL_OUTOF10: 0
PAINLEVEL_OUTOF10: 3
PAINLEVEL_OUTOF10: 2
PAINLEVEL_OUTOF10: 3
PAINLEVEL_OUTOF10: 0
PAINLEVEL_OUTOF10: 5

## 2024-07-03 ASSESSMENT — PAIN DESCRIPTION - FREQUENCY: FREQUENCY: INTERMITTENT

## 2024-07-03 ASSESSMENT — PAIN - FUNCTIONAL ASSESSMENT: PAIN_FUNCTIONAL_ASSESSMENT: ACTIVITIES ARE NOT PREVENTED

## 2024-07-03 ASSESSMENT — PAIN DESCRIPTION - PAIN TYPE: TYPE: ACUTE PAIN

## 2024-07-03 ASSESSMENT — PAIN DESCRIPTION - LOCATION
LOCATION: HEAD

## 2024-07-03 ASSESSMENT — PAIN DESCRIPTION - ORIENTATION
ORIENTATION: ANTERIOR

## 2024-07-03 NOTE — ANESTHESIA PROCEDURE NOTES
Arterial Line:    An arterial line was placed using surface landmarks, in the pre-op for the following indication(s): continuous blood pressure monitoring and blood sampling needed.    A 20 gauge (size) (length), Arrow (type) catheter was placed, Seldinger technique used, into the left radial artery, secured by Tegaderm.  Anesthesia type: Local  Local infiltration: Injection    Events:  patient tolerated procedure well with no complications.  Anesthesiologist: Francisca Gibson DO  Performed: Anesthesiologist   Preanesthetic Checklist  Completed: patient identified, IV checked, site marked, risks and benefits discussed, surgical/procedural consents, equipment checked, pre-op evaluation, timeout performed, anesthesia consent given, oxygen available, monitors applied/VS acknowledged and fire risk safety assessment completed and verbalized

## 2024-07-03 NOTE — BRIEF OP NOTE
Neuro-Interventional Surgery - Brief procedure note      Patient: Gaby Stroud MRN: [id number]     YOB: 1960  Age: 64 y.o.  Sex: female      Service: Neuro-Interventional Surgery    Date of Procedure: 7/3/2024    Pre-Procedure Diagnosis: Intracranial aneurysm and Subarachnoid hemorrhage    Post-Procedure Diagnosis: SAME    Procedure(s): Detachable coil embolization of anterior communicating artery complex aneurysm    Vessels selected: Right common carotid artery, Left common carotid artery, Left internal carotid artery, Left vertebral artery, and Left anterior cerebral artery    Brief Description of Procedure: Small, irregular, 2.5 mm x 2 mm anterior communicating artery complex aneurysm arising from the A1-A2 junction of the left BILL identified.  Successful detachable coil embolization of the aneurysm performed without immediate complications.  No significant residual filling of the aneurysm noted.    Right common femoral arterial puncture site hemostasis achieved by deploying 6 F Angio-Seal closure device without complications.  No hematoma or oozing noted.  Distal right lower extremity pulses remain unchanged post hemostasis.  Sterile dressing applied over the puncture site.     Anesthesia:General    Estimated Blood Loss:  20 ml.    Specimens:  None    Implants: 2 mm x 3.5 cm target tetra coil    Apparent Intraoperative Complications:  None immediate    Patient Condition:  Stable    Disposition:  Intensive care unit    Attestation:  I performed the procedure.        Signed By: Colin Stokes MD     July 3, 2024     [facility] NEUROINTERVENTIONAL SURGERY       This is a 56-year-old female the history of hypertension, diabetes and stroke that presents with a chief complaint of diarrhea. The patient reports that she woke up at approximately 3 AM with multiple episodes of watery diarrhea without blood. She denies any abdominal pain, vomiting, fevers, chills, chest pain or shortness of breath. She is not currently taking any antibiotics. She has no sick contacts or recent travel. Past Medical History:   Diagnosis Date    Basilar artery stenosis 2016    MRA brain:  There is moderate stenosis in the mid basilar artery.      Cerebral atrophy ([address] 2016    MRI brain    CVA (cerebral vascular accident) ([address] 2002, , 2010 ( per pt she has had 14 cva /tia in last 16 yrs)    Diabetes ([address]     Diabetes mellitus, insulin dependent (IDDM), uncontrolled     DVT (deep venous thrombosis) ([address] 2012    Left Lower Extremity (tx'd w/ warfarin)    Hypercholesterolemia     Hypertension     Musculoskeletal disorder     JANEL (obstructive sleep apnea)     uses CPAP    Stenosis of left middle cerebral artery 2016    MRA brain:   Moderate stenosis in the proximal left M1.     Stool color black        Past Surgical History:   Procedure Laterality Date    DELIVERY       x 2    HX BREAST REDUCTION      HX GYN  ,     c section    HX MENISCECTOMY      HX ORTHOPAEDIC      right TMA         Family History:   Problem Relation Age of Onset    Hypertension Mother     Diabetes Mother     Stroke Mother     Cancer Mother     Heart Disease Mother     Diabetes Father     Heart Disease Sister        Social History     Socioeconomic History    Marital status: SINGLE     Spouse name: Not on file    Number of children: Not on file    Years of education: Not on file    Highest education level: Not on file   Occupational History    Occupation: homemaker   Social Needs    Financial resource strain: Not on file   Sirisha-Tavares insecurity     Worry: Not on file     Inability: Not on file    Transportation needs     Medical: Not on file     Non-medical: Not on file   Tobacco Use    Smoking status: Former Smoker     Packs/day: 0.75     Years: 36.00     Pack years: 27.00     Types: Cigarettes     Last attempt to quit: 9/3/2018     Years since quittin.8    Smokeless tobacco: Former User   Substance and Sexual Activity    Alcohol use: No    Drug use: No    Sexual activity: Not Currently     Birth control/protection: None   Lifestyle    Physical activity     Days per week: Not on file     Minutes per session: Not on file    Stress: Not on file   Relationships    Social connections     Talks on phone: Not on file     Gets together: Not on file     Attends Rastafarian service: Not on file     Active member of club or organization: Not on file     Attends meetings of clubs or organizations: Not on file     Relationship status: Not on file    Intimate partner violence     Fear of current or ex partner: Not on file     Emotionally abused: Not on file     Physically abused: Not on file     Forced sexual activity: Not on file   Other Topics Concern     Service Not Asked    Blood Transfusions Not Asked    Caffeine Concern Not Asked    Occupational Exposure Not Asked   Adilia Radish Hazards Not Asked    Sleep Concern Not Asked    Stress Concern Not Asked    Weight Concern Not Asked    Special Diet Not Asked    Back Care Not Asked    Exercise Not Asked    Bike Helmet Not Asked   [address] Not Asked    Self-Exams Not Asked   Social History Narrative    Not on file         ALLERGIES: Pineapple; Demerol [meperidine]; Erythromycin; Hydralazine; Keflex [cephalexin]; and Metformin    Review of Systems   Constitutional: Negative for fever. HENT: Negative for rhinorrhea. Respiratory: Negative for shortness of breath. Cardiovascular: Negative for chest pain. Gastrointestinal: Positive for diarrhea.  Negative for abdominal pain.   Genitourinary: Negative for dysuria. Musculoskeletal: Negative for back pain. Skin: Negative for wound. Neurological: Negative for headaches. Psychiatric/Behavioral: Negative for confusion. Vitals:    07/11/20 0801 07/11/20 0813   BP: 132/69    Pulse: 67    Resp: 18    Temp: 98.5 °F (36.9 °C)    SpO2: 96% 96%   Weight: 74.4 kg (164 lb)    Height: 5' 6\" (1.676 m)             Physical Exam  Vitals signs and nursing note reviewed. Constitutional:       General: She is not in acute distress. Appearance: Normal appearance. HENT:      Head: Normocephalic and atraumatic. Mouth/Throat:      Mouth: Mucous membranes are dry. Eyes:      Extraocular Movements: Extraocular movements intact. Neck:      Musculoskeletal: Normal range of motion. Cardiovascular:      Rate and Rhythm: Normal rate and regular rhythm. Pulses: Normal pulses. Heart sounds: Normal heart sounds. No murmur. No friction rub. No gallop. Pulmonary:      Effort: Pulmonary effort is normal.      Breath sounds: Normal breath sounds. Abdominal:      General: Bowel sounds are normal.      Tenderness: There is no abdominal tenderness. Musculoskeletal: Normal range of motion. Skin:     General: Skin is warm and dry. Capillary Refill: Capillary refill takes less than 2 seconds. Comments: Significant erythema underlying the pannus   Neurological:      Mental Status: She is alert and oriented to person, place, and time. MDM  Number of Diagnoses or Management Options  DIVYA (acute kidney injury) ([facility] [address]):   Dehydration:   Diarrhea, unspecified type:   Diagnosis management comments: This is a 57-year-old poorly controlled diabetic that presents with a chief complaint of diarrhea since 3 AM.  Clinically she appears somewhat dehydrated. She does have evidence of a yeast infection underlying her pannus. She will be hydrated with IV fluids and laboratory studies will be obtained.   Her abdomen is nontender. A KUB was obtained and showed no evidence of obstruction. Laboratory studies show acute on chronic kidney disease. She will be admitted to the hospital for acute kidney injury and dehydration. Darcieist Aurelia Serve for Admission  9:11 AM    ED Room Number: ER08/08  Patient Name and age: Colleen Buchanan 64 y.o.  female  Working Diagnosis: DIVYA (acute kidney injury) ([facility] [address] [address]  (primary encounter diagnosis)  Dehydration  Diarrhea, unspecified type    COVID-19 Suspicion:  no    Code Status:  Full Code  Readmission: no  Isolation Requirements:  no  Recommended Level of Care:  med/surg  Department:[facility] ED - [phone]  Other: Patient presents with diarrhea since 3 AM, watery stools without blood. Laboratory studies show CKD; however, she is suffering from acute kidney injury and dehydration.            Procedures

## 2024-07-03 NOTE — ANESTHESIA POSTPROCEDURE EVALUATION
Department of Anesthesiology  Postprocedure Note    Patient: Gaby Stroud  MRN: [id number]  YOB: 1960  Date of evaluation: 7/3/2024    Procedure Summary       Date: 07/03/24 Room / Location: [facility] ANGIO IR    Anesthesia Start: 1025 Anesthesia Stop: 1236    Procedure: IR ARCH UNI CAR CERV CEREBRAL Diagnosis: (SAH)    Scheduled Providers: Francisca Gibson DO Responsible Provider: Francisca Gibson DO    Anesthesia Type: General ASA Status: 4            Anesthesia Type: General    Jean-Paul Phase I: Jean-Paul Score: 10    Jean-Paul Phase II:      Anesthesia Post Evaluation    Patient location during evaluation: bedside  Patient participation: complete - patient participated  Level of consciousness: sleepy but conscious  Pain score: 0  Airway patency: patent  Nausea & Vomiting: no nausea and no vomiting  Cardiovascular status: hemodynamically stable and vasoactive/inotropes  Respiratory status: acceptable  Hydration status: euvolemic  Pain management: adequate        No notable events documented.

## 2024-07-03 NOTE — ANESTHESIA PRE PROCEDURE
METS)        ECG reviewed  Rhythm: regular  Rate: normal           Beta Blocker:  Not on Beta Blocker         Neuro/Psych:   (+) CVA:, psychiatric history: stable with treatment            GI/Hepatic/Renal: Neg GI/Hepatic/Renal ROS            Endo/Other: Negative Endo/Other ROS                    Abdominal:             Vascular:   + PVD, aortic or cerebral.        ROS comment: SAH  Seizure precautions . Other Findings:       Anesthesia Plan      general     ASA 4       Induction: intravenous.  arterial line  MIPS: Prophylactic antiemetics administered.  Anesthetic plan and risks discussed with patient.    Use of blood products discussed with patient whom consented to blood products.    Plan discussed with SOFIA.                Francisca Gibson DO   7/3/2024

## 2024-07-04 ENCOUNTER — APPOINTMENT (OUTPATIENT)
Facility: HOSPITAL | Age: 64
DRG: 021 | End: 2024-07-04
Attending: INTERNAL MEDICINE
Payer: COMMERCIAL

## 2024-07-04 ENCOUNTER — APPOINTMENT (OUTPATIENT)
Facility: HOSPITAL | Age: 64
DRG: 021 | End: 2024-07-04
Payer: COMMERCIAL

## 2024-07-04 LAB
ANION GAP SERPL CALC-SCNC: 7 MMOL/L (ref 5–15)
BASOPHILS # BLD: 0 K/UL (ref 0–0.1)
BASOPHILS NFR BLD: 0 % (ref 0–1)
BUN SERPL-MCNC: 5 MG/DL (ref 6–20)
BUN/CREAT SERPL: 11 (ref 12–20)
CALCIUM SERPL-MCNC: 8.5 MG/DL (ref 8.5–10.1)
CHLORIDE SERPL-SCNC: 109 MMOL/L (ref 97–108)
CO2 SERPL-SCNC: 22 MMOL/L (ref 21–32)
CREAT SERPL-MCNC: 0.46 MG/DL (ref 0.55–1.02)
CRP SERPL-MCNC: 1 MG/DL (ref 0–0.3)
DIFFERENTIAL METHOD BLD: ABNORMAL
ECHO BSA: 1.62 M2
ECHO EST RA PRESSURE: 3 MMHG
ECHO LA VOL A-L A4C: 51 ML (ref 22–52)
ECHO LA VOL MOD A4C: 51 ML (ref 22–52)
ECHO LA VOLUME INDEX A-L A4C: 31 ML/M2 (ref 16–34)
ECHO LA VOLUME INDEX MOD A4C: 31 ML/M2 (ref 16–34)
ECHO LV E' LATERAL VELOCITY: 8 CM/S
ECHO LV E' SEPTAL VELOCITY: 7 CM/S
ECHO LV FRACTIONAL SHORTENING: 42 % (ref 28–44)
ECHO LV INTERNAL DIMENSION DIASTOLE INDEX: 2.22 CM/M2
ECHO LV INTERNAL DIMENSION DIASTOLIC: 3.6 CM (ref 3.9–5.3)
ECHO LV INTERNAL DIMENSION SYSTOLIC INDEX: 1.3 CM/M2
ECHO LV INTERNAL DIMENSION SYSTOLIC: 2.1 CM
ECHO LV IVSD: 0.8 CM (ref 0.6–0.9)
ECHO LV MASS 2D: 85.6 G (ref 67–162)
ECHO LV MASS INDEX 2D: 52.9 G/M2 (ref 43–95)
ECHO LV POSTERIOR WALL DIASTOLIC: 0.9 CM (ref 0.6–0.9)
ECHO LV RELATIVE WALL THICKNESS RATIO: 0.5
ECHO LVOT AREA: 2.8 CM2
ECHO LVOT DIAM: 1.9 CM
ECHO LVOT PEAK GRADIENT: 19 MMHG
ECHO LVOT PEAK VELOCITY: 2.2 M/S
ECHO MV A VELOCITY: 1.34 M/S
ECHO MV E VELOCITY: 1.15 M/S
ECHO MV E/A RATIO: 0.86
ECHO MV E/E' LATERAL: 14.38
ECHO MV E/E' RATIO (AVERAGED): 15.4
ECHO MV E/E' SEPTAL: 16.43
ECHO RIGHT VENTRICULAR SYSTOLIC PRESSURE (RVSP): 47 MMHG
ECHO RV INTERNAL DIMENSION: 3.2 CM
ECHO RV TAPSE: 2.9 CM (ref 1.7–?)
ECHO TV REGURGITANT MAX VELOCITY: 3.31 M/S
ECHO TV REGURGITANT PEAK GRADIENT: 44 MMHG
EOSINOPHIL # BLD: 0 K/UL (ref 0–0.4)
EOSINOPHIL NFR BLD: 0 % (ref 0–7)
ERYTHROCYTE [DISTWIDTH] IN BLOOD BY AUTOMATED COUNT: 12.7 % (ref 11.5–14.5)
EST. AVERAGE GLUCOSE BLD GHB EST-MCNC: 97 MG/DL
GLUCOSE SERPL-MCNC: 88 MG/DL (ref 65–100)
HBA1C MFR BLD: 5 % (ref 4–5.6)
HCT VFR BLD AUTO: 35 % (ref 35–47)
HGB BLD-MCNC: 11.8 G/DL (ref 11.5–16)
IMM GRANULOCYTES # BLD AUTO: 0 K/UL (ref 0–0.04)
IMM GRANULOCYTES NFR BLD AUTO: 1 % (ref 0–0.5)
LYMPHOCYTES # BLD: 2.1 K/UL (ref 0.8–3.5)
LYMPHOCYTES NFR BLD: 27 % (ref 12–49)
MAGNESIUM SERPL-MCNC: 2 MG/DL (ref 1.6–2.4)
MCH RBC QN AUTO: 32.3 PG (ref 26–34)
MCHC RBC AUTO-ENTMCNC: 33.7 G/DL (ref 30–36.5)
MCV RBC AUTO: 95.9 FL (ref 80–99)
MONOCYTES # BLD: 0.9 K/UL (ref 0–1)
MONOCYTES NFR BLD: 11 % (ref 5–13)
NEUTS SEG # BLD: 5 K/UL (ref 1.8–8)
NEUTS SEG NFR BLD: 61 % (ref 32–75)
NRBC # BLD: 0 K/UL (ref 0–0.01)
NRBC BLD-RTO: 0 PER 100 WBC
PLATELET # BLD AUTO: 304 K/UL (ref 150–400)
PMV BLD AUTO: 9 FL (ref 8.9–12.9)
POTASSIUM SERPL-SCNC: 3.2 MMOL/L (ref 3.5–5.1)
RBC # BLD AUTO: 3.65 M/UL (ref 3.8–5.2)
SODIUM SERPL-SCNC: 138 MMOL/L (ref 136–145)
TROPONIN I SERPL HS-MCNC: 37 NG/L (ref 0–51)
WBC # BLD AUTO: 8.1 K/UL (ref 3.6–11)

## 2024-07-04 PROCEDURE — 92523 SPEECH SOUND LANG COMPREHEN: CPT | Performed by: SPEECH-LANGUAGE PATHOLOGIST

## 2024-07-04 PROCEDURE — 2000000000 HC ICU R&B

## 2024-07-04 PROCEDURE — 6370000000 HC RX 637 (ALT 250 FOR IP): Performed by: INTERNAL MEDICINE

## 2024-07-04 PROCEDURE — 93306 TTE W/DOPPLER COMPLETE: CPT | Performed by: SPECIALIST

## 2024-07-04 PROCEDURE — 85025 COMPLETE CBC W/AUTO DIFF WBC: CPT

## 2024-07-04 PROCEDURE — 83036 HEMOGLOBIN GLYCOSYLATED A1C: CPT

## 2024-07-04 PROCEDURE — 97165 OT EVAL LOW COMPLEX 30 MIN: CPT

## 2024-07-04 PROCEDURE — 86140 C-REACTIVE PROTEIN: CPT

## 2024-07-04 PROCEDURE — 2580000003 HC RX 258: Performed by: NURSE PRACTITIONER

## 2024-07-04 PROCEDURE — 6360000002 HC RX W HCPCS: Performed by: INTERNAL MEDICINE

## 2024-07-04 PROCEDURE — 70450 CT HEAD/BRAIN W/O DYE: CPT

## 2024-07-04 PROCEDURE — 6360000002 HC RX W HCPCS

## 2024-07-04 PROCEDURE — 2580000003 HC RX 258: Performed by: INTERNAL MEDICINE

## 2024-07-04 PROCEDURE — 6370000000 HC RX 637 (ALT 250 FOR IP)

## 2024-07-04 PROCEDURE — 80048 BASIC METABOLIC PNL TOTAL CA: CPT

## 2024-07-04 PROCEDURE — 37799 UNLISTED PX VASCULAR SURGERY: CPT

## 2024-07-04 PROCEDURE — 93306 TTE W/DOPPLER COMPLETE: CPT

## 2024-07-04 PROCEDURE — 97161 PT EVAL LOW COMPLEX 20 MIN: CPT

## 2024-07-04 PROCEDURE — 84484 ASSAY OF TROPONIN QUANT: CPT

## 2024-07-04 PROCEDURE — 83735 ASSAY OF MAGNESIUM: CPT

## 2024-07-04 PROCEDURE — APPNB15 APP NON BILLABLE TIME 0-15 MINS: Performed by: NURSE PRACTITIONER

## 2024-07-04 PROCEDURE — 36415 COLL VENOUS BLD VENIPUNCTURE: CPT

## 2024-07-04 PROCEDURE — 97112 NEUROMUSCULAR REEDUCATION: CPT

## 2024-07-04 PROCEDURE — 6370000000 HC RX 637 (ALT 250 FOR IP): Performed by: NURSE PRACTITIONER

## 2024-07-04 PROCEDURE — 99233 SBSQ HOSP IP/OBS HIGH 50: CPT | Performed by: NURSE PRACTITIONER

## 2024-07-04 PROCEDURE — 92610 EVALUATE SWALLOWING FUNCTION: CPT | Performed by: SPEECH-LANGUAGE PATHOLOGIST

## 2024-07-04 RX ORDER — SODIUM CHLORIDE, SODIUM LACTATE, POTASSIUM CHLORIDE, CALCIUM CHLORIDE 600; 310; 30; 20 MG/100ML; MG/100ML; MG/100ML; MG/100ML
INJECTION, SOLUTION INTRAVENOUS CONTINUOUS
Status: DISCONTINUED | OUTPATIENT
Start: 2024-07-04 | End: 2024-07-08

## 2024-07-04 RX ORDER — LOSARTAN POTASSIUM 50 MG/1
50 TABLET ORAL DAILY
Status: DISCONTINUED | OUTPATIENT
Start: 2024-07-04 | End: 2024-07-04

## 2024-07-04 RX ADMIN — BUTALBITAL, ACETAMINOPHEN, AND CAFFEINE 1 TABLET: 50; 325; 40 TABLET ORAL at 23:08

## 2024-07-04 RX ADMIN — SODIUM CHLORIDE, PRESERVATIVE FREE 10 ML: 5 INJECTION INTRAVENOUS at 20:02

## 2024-07-04 RX ADMIN — BUTALBITAL, ACETAMINOPHEN, AND CAFFEINE 1 TABLET: 50; 325; 40 TABLET ORAL at 12:30

## 2024-07-04 RX ADMIN — SODIUM CHLORIDE, PRESERVATIVE FREE 30 ML: 5 INJECTION INTRAVENOUS at 08:23

## 2024-07-04 RX ADMIN — LEVETIRACETAM 500 MG: 100 INJECTION INTRAVENOUS at 04:02

## 2024-07-04 RX ADMIN — NIMODIPINE 60 MG: 30 CAPSULE, LIQUID FILLED ORAL at 15:46

## 2024-07-04 RX ADMIN — HYDRALAZINE HYDROCHLORIDE 10 MG: 20 INJECTION INTRAMUSCULAR; INTRAVENOUS at 08:37

## 2024-07-04 RX ADMIN — POTASSIUM BICARBONATE 40 MEQ: 782 TABLET, EFFERVESCENT ORAL at 12:00

## 2024-07-04 RX ADMIN — NIMODIPINE 60 MG: 30 CAPSULE, LIQUID FILLED ORAL at 08:23

## 2024-07-04 RX ADMIN — NIMODIPINE 60 MG: 30 CAPSULE, LIQUID FILLED ORAL at 04:01

## 2024-07-04 RX ADMIN — LEVETIRACETAM 500 MG: 100 INJECTION INTRAVENOUS at 17:44

## 2024-07-04 RX ADMIN — BUTALBITAL, ACETAMINOPHEN, AND CAFFEINE 1 TABLET: 50; 325; 40 TABLET ORAL at 08:23

## 2024-07-04 RX ADMIN — VENLAFAXINE HYDROCHLORIDE 150 MG: 150 CAPSULE, EXTENDED RELEASE ORAL at 08:23

## 2024-07-04 RX ADMIN — NIMODIPINE 60 MG: 30 CAPSULE, LIQUID FILLED ORAL at 12:30

## 2024-07-04 RX ADMIN — SODIUM CHLORIDE, POTASSIUM CHLORIDE, SODIUM LACTATE AND CALCIUM CHLORIDE: 600; 310; 30; 20 INJECTION, SOLUTION INTRAVENOUS at 19:51

## 2024-07-04 RX ADMIN — BUTALBITAL, ACETAMINOPHEN, AND CAFFEINE 1 TABLET: 50; 325; 40 TABLET ORAL at 04:02

## 2024-07-04 RX ADMIN — NIMODIPINE 60 MG: 30 CAPSULE, LIQUID FILLED ORAL at 20:00

## 2024-07-04 RX ADMIN — BUTALBITAL, ACETAMINOPHEN, AND CAFFEINE 1 TABLET: 50; 325; 40 TABLET ORAL at 15:45

## 2024-07-04 ASSESSMENT — PAIN DESCRIPTION - DESCRIPTORS
DESCRIPTORS: ACHING
DESCRIPTORS: ACHING
DESCRIPTORS: DULL
DESCRIPTORS: ACHING

## 2024-07-04 ASSESSMENT — PAIN SCALES - GENERAL
PAINLEVEL_OUTOF10: 2
PAINLEVEL_OUTOF10: 0
PAINLEVEL_OUTOF10: 2
PAINLEVEL_OUTOF10: 1
PAINLEVEL_OUTOF10: 2
PAINLEVEL_OUTOF10: 3
PAINLEVEL_OUTOF10: 3

## 2024-07-04 ASSESSMENT — PAIN DESCRIPTION - LOCATION
LOCATION: HEAD

## 2024-07-04 ASSESSMENT — PAIN DESCRIPTION - ORIENTATION: ORIENTATION: ANTERIOR

## 2024-07-04 NOTE — CONSULTS
NeuroInterventional Surgery Consult  Sherley Sun, JEANETTE - PHILIPP    Patient: Gaby Stroud MRN: [id number]  SSN: [id number]    YOB: 1960  Age: 64 y.o.  Sex: female      Chief Complaint: Headache, nausea     HPI:   64 y.o. L-H White (non-)  female w/ pmh of depression who presented to [facility] today, 7/2/24 reporting severe headache, N&V.  CTH revealed SAH around bilateral sylvian fissures. CTAh-n showed small left cavernous ICA aneurysm.  Pt reports that she first experienced sudden 6/10 headache on evening of Saturday 6/29/24, however she was able to fall asleep. She woke on 6/30 w/ 10/10 headache, fell back asleep and after waking again noticed that her right leg, \"wouldn't move,\" felt, \"slow to react\".  She reports sleeping most of 6/30, but experienced N&V w/ consumption of any food or drink. She reports taking asa for pain, called off work on 7/1, took Ativan and ibuprofen for pain and to help sleep.  She went to work today, but left to see her [facility]  NIS was notified and has been consulted for finding of SAH on CT and left ICA aneurysm of CTA.  Pt was transferred to [facility] for higher level of care and admitted to ICU. Vitals upon arrival: /102, Temp 97.9F, , SpO2 100% on RA, RR 19. Remarkable labs  include Na of 130, K 3.2.    Pt seen lying in bed in no acute distress. She reports 6/10 headache. Denies dizziness, weakness, numbness or tingling, vision changes, shortness of breath, and chest pain.  She denies tobacco and recreational drug use.  She endorses drinking etoh 3-4 days per week including beer, wine, and liquor.  She denies knowledge of any family medical history of stroke or cerebra aneurysm.       Past Medical History:   Diagnosis Date    Psychiatric disorder     depresssion      Family History   Problem Relation Age of Onset    Breast Cancer Mother 63        X 2     Social History     Tobacco Use    Smoking status: Former    Smokeless 
very well, status post securing of the aneurysm with coils.  She has mild hydrocephalus on the films, but has a generally intact neurologic exam, easily reproducible follow exam.  At this point, I would not recommend any neurosurgical intervention.  Continue to follow her clinically with serial imaging as needed.  Watch for hydrocephalus.  Hopefully, she will not need this.        KINGS LEMOS MD      NADINE/APRILS  D:  07/04/2024 08:04:41  T:  07/04/2024 08:52:10  JOB #:  [id number]    CC:   Kings Lemos MD  
PM     EKG 12 lead    Collection Time: 07/02/24  5:42 PM   Result Value Ref Range    Ventricular Rate 94 BPM    Atrial Rate 94 BPM    P-R Interval 148 ms    QRS Duration 74 ms    Q-T Interval 372 ms    QTc Calculation (Bazett) 465 ms    P Axis 60 degrees    R Axis 53 degrees    T Axis 55 degrees    Diagnosis       Normal sinus rhythm  Biatrial enlargement  Minimal voltage criteria for LVH, may be normal variant ( Sokolow-Souza )  Abnormal ECG  When compared with ECG of 02-JUL-2024 17:39,  No significant change  Confirmed by Nicolas Edmond ([id number]) on 7/2/2024 8:10:00 PM     Basic metabolic panel    Collection Time: 07/03/24 12:15 AM   Result Value Ref Range    Sodium 132 (L) 136 - 145 mmol/L    Potassium 3.2 (L) 3.5 - 5.1 mmol/L    Chloride 99 97 - 108 mmol/L    CO2 23 21 - 32 mmol/L    Anion Gap 10 5 - 15 mmol/L    Glucose 111 (H) 65 - 100 mg/dL    BUN 10 6 - 20 MG/DL    Creatinine 0.64 0.55 - 1.02 MG/DL    BUN/Creatinine Ratio 16 12 - 20      Est, Glom Filt Rate >90 >60 ml/min/1.73m2    Calcium 9.0 8.5 - 10.1 MG/DL   Magnesium    Collection Time: 07/03/24 12:15 AM   Result Value Ref Range    Magnesium 2.1 1.6 - 2.4 mg/dL   CBC    Collection Time: 07/03/24 12:15 AM   Result Value Ref Range    WBC 8.9 3.6 - 11.0 K/uL    RBC 4.22 3.80 - 5.20 M/uL    Hemoglobin 13.8 11.5 - 16.0 g/dL    Hematocrit 39.5 35.0 - 47.0 %    MCV 93.6 80.0 - 99.0 FL    MCH 32.7 26.0 - 34.0 PG    MCHC 34.9 30.0 - 36.5 g/dL    RDW 12.7 11.5 - 14.5 %    Platelets 328 150 - 400 K/uL    MPV 9.5 8.9 - 12.9 FL    Nucleated RBCs 0.0 0  WBC    nRBC 0.00 0.00 - 0.01 K/uL   Lipid Panel    Collection Time: 07/03/24 12:15 AM   Result Value Ref Range    Cholesterol, Total 204 (H) <200 MG/DL    Triglycerides 56 <150 MG/DL    HDL 92 MG/DL    LDL Cholesterol 100.8 (H) 0 - 100 MG/DL    VLDL Cholesterol Calculated 11.2 MG/DL    Chol/HDL Ratio 2.2 0.0 - 5.0     Troponin    Collection Time: 07/03/24 12:15 AM   Result Value Ref Range    Troponin, High

## 2024-07-05 ENCOUNTER — APPOINTMENT (OUTPATIENT)
Facility: HOSPITAL | Age: 64
DRG: 021 | End: 2024-07-05
Payer: COMMERCIAL

## 2024-07-05 LAB
ANION GAP SERPL CALC-SCNC: 5 MMOL/L (ref 5–15)
BUN SERPL-MCNC: 7 MG/DL (ref 6–20)
BUN/CREAT SERPL: 14 (ref 12–20)
CALCIUM SERPL-MCNC: 8.3 MG/DL (ref 8.5–10.1)
CHLORIDE SERPL-SCNC: 109 MMOL/L (ref 97–108)
CO2 SERPL-SCNC: 25 MMOL/L (ref 21–32)
CREAT SERPL-MCNC: 0.49 MG/DL (ref 0.55–1.02)
ECHO BSA: 1.62 M2
ERYTHROCYTE [DISTWIDTH] IN BLOOD BY AUTOMATED COUNT: 13.1 % (ref 11.5–14.5)
ERYTHROCYTE [SEDIMENTATION RATE] IN BLOOD: 23 MM/HR (ref 0–30)
GLUCOSE SERPL-MCNC: 103 MG/DL (ref 65–100)
HCT VFR BLD AUTO: 33.7 % (ref 35–47)
HGB BLD-MCNC: 11.2 G/DL (ref 11.5–16)
MAGNESIUM SERPL-MCNC: 1.8 MG/DL (ref 1.6–2.4)
MCH RBC QN AUTO: 32.7 PG (ref 26–34)
MCHC RBC AUTO-ENTMCNC: 33.2 G/DL (ref 30–36.5)
MCV RBC AUTO: 98.5 FL (ref 80–99)
NRBC # BLD: 0 K/UL (ref 0–0.01)
NRBC BLD-RTO: 0 PER 100 WBC
PHOSPHATE SERPL-MCNC: 2.6 MG/DL (ref 2.6–4.7)
PLATELET # BLD AUTO: 279 K/UL (ref 150–400)
PMV BLD AUTO: 9.3 FL (ref 8.9–12.9)
POTASSIUM SERPL-SCNC: 3.7 MMOL/L (ref 3.5–5.1)
RBC # BLD AUTO: 3.42 M/UL (ref 3.8–5.2)
SODIUM SERPL-SCNC: 139 MMOL/L (ref 136–145)
VAS BASILAR ARTERY EDV: 36.5 CM/S
VAS BASILAR ARTERY MEAN VEL: 57 CM/S
VAS BASILAR ARTERY PSV: 98 CM/S
VAS LEFT EX ICA MEAN VEL: 39 CM/S
VAS LEFT ICA DIST EDV: 21.5 CM/S
VAS LEFT ICA DIST PSV: 72 CM/S
VAS LEFT LINDEGAARD RATIO: 1.2
VAS LEFT MCA 1 EDV: 32.6 CM/S
VAS LEFT MCA 1 MEAN VEL: 46.9 CM/S
VAS LEFT MCA 1 PSV: 75.5 CM/S
VAS LEFT PCA 1 EDV: 26.8 CM/S
VAS LEFT PCA 1 MEAN VEL: 43.2 CM/S
VAS LEFT PCA 1 PSV: 76.1 CM/S
VAS RIGHT EX ICA MEAN VEL: 37 CM/S
VAS RIGHT ICA DIST EDV: 19.3 CM/S
VAS RIGHT ICA DIST PSV: 74.2 CM/S
VAS RIGHT LINDEGAARD RATIO: 1.4
VAS RIGHT MCA 1 EDV: 29.3 CM/S
VAS RIGHT MCA 1 MEAN VEL: 52.2 CM/S
VAS RIGHT MCA 1 PSV: 98 CM/S
VAS RIGHT PCA 1 EDV: 24.1 CM/S
VAS RIGHT PCA 1 MEAN VEL: 39.6 CM/S
VAS RIGHT PCA 1 PSV: 70.7 CM/S
WBC # BLD AUTO: 8.1 K/UL (ref 3.6–11)

## 2024-07-05 PROCEDURE — 80048 BASIC METABOLIC PNL TOTAL CA: CPT

## 2024-07-05 PROCEDURE — 6370000000 HC RX 637 (ALT 250 FOR IP): Performed by: NURSE PRACTITIONER

## 2024-07-05 PROCEDURE — 6370000000 HC RX 637 (ALT 250 FOR IP)

## 2024-07-05 PROCEDURE — 36415 COLL VENOUS BLD VENIPUNCTURE: CPT

## 2024-07-05 PROCEDURE — 6360000002 HC RX W HCPCS: Performed by: INTERNAL MEDICINE

## 2024-07-05 PROCEDURE — 6360000002 HC RX W HCPCS

## 2024-07-05 PROCEDURE — 97116 GAIT TRAINING THERAPY: CPT

## 2024-07-05 PROCEDURE — 2580000003 HC RX 258: Performed by: INTERNAL MEDICINE

## 2024-07-05 PROCEDURE — 6370000000 HC RX 637 (ALT 250 FOR IP): Performed by: INTERNAL MEDICINE

## 2024-07-05 PROCEDURE — 70450 CT HEAD/BRAIN W/O DYE: CPT

## 2024-07-05 PROCEDURE — 85652 RBC SED RATE AUTOMATED: CPT

## 2024-07-05 PROCEDURE — 85027 COMPLETE CBC AUTOMATED: CPT

## 2024-07-05 PROCEDURE — 2580000003 HC RX 258: Performed by: NURSE PRACTITIONER

## 2024-07-05 PROCEDURE — 93886 INTRACRANIAL COMPLETE STUDY: CPT

## 2024-07-05 PROCEDURE — 84100 ASSAY OF PHOSPHORUS: CPT

## 2024-07-05 PROCEDURE — 99024 POSTOP FOLLOW-UP VISIT: CPT | Performed by: PHYSICIAN ASSISTANT

## 2024-07-05 PROCEDURE — 6360000002 HC RX W HCPCS: Performed by: NURSE PRACTITIONER

## 2024-07-05 PROCEDURE — 83735 ASSAY OF MAGNESIUM: CPT

## 2024-07-05 PROCEDURE — 2000000000 HC ICU R&B

## 2024-07-05 RX ORDER — LOSARTAN POTASSIUM 25 MG/1
25 TABLET ORAL DAILY
Status: DISCONTINUED | OUTPATIENT
Start: 2024-07-05 | End: 2024-07-05

## 2024-07-05 RX ORDER — LEVETIRACETAM 500 MG/1
500 TABLET ORAL 2 TIMES DAILY
Status: COMPLETED | OUTPATIENT
Start: 2024-07-05 | End: 2024-07-09

## 2024-07-05 RX ORDER — HYDRALAZINE HYDROCHLORIDE 20 MG/ML
10 INJECTION INTRAMUSCULAR; INTRAVENOUS EVERY 6 HOURS PRN
Status: DISCONTINUED | OUTPATIENT
Start: 2024-07-05 | End: 2024-07-16 | Stop reason: HOSPADM

## 2024-07-05 RX ORDER — ENOXAPARIN SODIUM 100 MG/ML
40 INJECTION SUBCUTANEOUS EVERY 24 HOURS
Status: DISCONTINUED | OUTPATIENT
Start: 2024-07-05 | End: 2024-07-16 | Stop reason: HOSPADM

## 2024-07-05 RX ORDER — LABETALOL HYDROCHLORIDE 5 MG/ML
10 INJECTION, SOLUTION INTRAVENOUS EVERY 4 HOURS PRN
Status: DISCONTINUED | OUTPATIENT
Start: 2024-07-05 | End: 2024-07-16 | Stop reason: HOSPADM

## 2024-07-05 RX ORDER — MAGNESIUM SULFATE IN WATER 40 MG/ML
2000 INJECTION, SOLUTION INTRAVENOUS ONCE
Status: COMPLETED | OUTPATIENT
Start: 2024-07-05 | End: 2024-07-05

## 2024-07-05 RX ADMIN — LEVETIRACETAM 500 MG: 500 TABLET, FILM COATED ORAL at 17:50

## 2024-07-05 RX ADMIN — NIMODIPINE 60 MG: 30 CAPSULE, LIQUID FILLED ORAL at 08:58

## 2024-07-05 RX ADMIN — NIMODIPINE 60 MG: 30 CAPSULE, LIQUID FILLED ORAL at 00:14

## 2024-07-05 RX ADMIN — SODIUM CHLORIDE, POTASSIUM CHLORIDE, SODIUM LACTATE AND CALCIUM CHLORIDE: 600; 310; 30; 20 INJECTION, SOLUTION INTRAVENOUS at 17:32

## 2024-07-05 RX ADMIN — NIMODIPINE 60 MG: 30 CAPSULE, LIQUID FILLED ORAL at 04:19

## 2024-07-05 RX ADMIN — NIMODIPINE 60 MG: 30 CAPSULE, LIQUID FILLED ORAL at 20:03

## 2024-07-05 RX ADMIN — ACETAMINOPHEN 650 MG: 325 TABLET ORAL at 14:11

## 2024-07-05 RX ADMIN — LEVETIRACETAM 500 MG: 100 INJECTION INTRAVENOUS at 05:55

## 2024-07-05 RX ADMIN — ACETAMINOPHEN 650 MG: 325 TABLET ORAL at 00:14

## 2024-07-05 RX ADMIN — NIMODIPINE 60 MG: 30 CAPSULE, LIQUID FILLED ORAL at 16:14

## 2024-07-05 RX ADMIN — BUTALBITAL, ACETAMINOPHEN, AND CAFFEINE 1 TABLET: 50; 325; 40 TABLET ORAL at 04:21

## 2024-07-05 RX ADMIN — SODIUM CHLORIDE, PRESERVATIVE FREE 10 ML: 5 INJECTION INTRAVENOUS at 20:05

## 2024-07-05 RX ADMIN — SODIUM CHLORIDE, PRESERVATIVE FREE 10 ML: 5 INJECTION INTRAVENOUS at 08:00

## 2024-07-05 RX ADMIN — METOCLOPRAMIDE 10 MG: 5 INJECTION, SOLUTION INTRAMUSCULAR; INTRAVENOUS at 00:14

## 2024-07-05 RX ADMIN — SODIUM CHLORIDE, POTASSIUM CHLORIDE, SODIUM LACTATE AND CALCIUM CHLORIDE: 600; 310; 30; 20 INJECTION, SOLUTION INTRAVENOUS at 06:02

## 2024-07-05 RX ADMIN — MAGNESIUM SULFATE HEPTAHYDRATE 2000 MG: 40 INJECTION, SOLUTION INTRAVENOUS at 09:40

## 2024-07-05 RX ADMIN — LABETALOL HYDROCHLORIDE 10 MG: 5 INJECTION INTRAVENOUS at 09:09

## 2024-07-05 RX ADMIN — VENLAFAXINE HYDROCHLORIDE 150 MG: 150 CAPSULE, EXTENDED RELEASE ORAL at 08:58

## 2024-07-05 RX ADMIN — METOCLOPRAMIDE 10 MG: 5 INJECTION, SOLUTION INTRAMUSCULAR; INTRAVENOUS at 22:50

## 2024-07-05 RX ADMIN — LABETALOL HYDROCHLORIDE 10 MG: 5 INJECTION INTRAVENOUS at 18:28

## 2024-07-05 RX ADMIN — HYDRALAZINE HYDROCHLORIDE 10 MG: 20 INJECTION INTRAMUSCULAR; INTRAVENOUS at 07:24

## 2024-07-05 RX ADMIN — METOCLOPRAMIDE 10 MG: 5 INJECTION, SOLUTION INTRAMUSCULAR; INTRAVENOUS at 14:11

## 2024-07-05 RX ADMIN — NIMODIPINE 60 MG: 30 CAPSULE, LIQUID FILLED ORAL at 12:12

## 2024-07-05 RX ADMIN — BUTALBITAL, ACETAMINOPHEN, AND CAFFEINE 1 TABLET: 50; 325; 40 TABLET ORAL at 22:50

## 2024-07-05 RX ADMIN — HYDRALAZINE HYDROCHLORIDE 10 MG: 20 INJECTION INTRAMUSCULAR; INTRAVENOUS at 17:40

## 2024-07-05 RX ADMIN — BUTALBITAL, ACETAMINOPHEN, AND CAFFEINE 1 TABLET: 50; 325; 40 TABLET ORAL at 18:27

## 2024-07-05 RX ADMIN — ENOXAPARIN SODIUM 40 MG: 100 INJECTION SUBCUTANEOUS at 14:11

## 2024-07-05 RX ADMIN — BUTALBITAL, ACETAMINOPHEN, AND CAFFEINE 1 TABLET: 50; 325; 40 TABLET ORAL at 09:08

## 2024-07-05 ASSESSMENT — PAIN SCALES - GENERAL
PAINLEVEL_OUTOF10: 3
PAINLEVEL_OUTOF10: 3
PAINLEVEL_OUTOF10: 4
PAINLEVEL_OUTOF10: 1
PAINLEVEL_OUTOF10: 5
PAINLEVEL_OUTOF10: 0
PAINLEVEL_OUTOF10: 5
PAINLEVEL_OUTOF10: 1
PAINLEVEL_OUTOF10: 2

## 2024-07-05 ASSESSMENT — PAIN DESCRIPTION - DESCRIPTORS
DESCRIPTORS: ACHING
DESCRIPTORS: DULL
DESCRIPTORS: ACHING
DESCRIPTORS: DULL
DESCRIPTORS: DULL
DESCRIPTORS: ACHING
DESCRIPTORS: DULL
DESCRIPTORS: DULL

## 2024-07-05 ASSESSMENT — PAIN DESCRIPTION - LOCATION
LOCATION: HEAD

## 2024-07-05 ASSESSMENT — PAIN DESCRIPTION - ORIENTATION
ORIENTATION: ANTERIOR

## 2024-07-05 ASSESSMENT — PAIN - FUNCTIONAL ASSESSMENT
PAIN_FUNCTIONAL_ASSESSMENT: PREVENTS OR INTERFERES SOME ACTIVE ACTIVITIES AND ADLS
PAIN_FUNCTIONAL_ASSESSMENT: PREVENTS OR INTERFERES SOME ACTIVE ACTIVITIES AND ADLS

## 2024-07-05 NOTE — CARE COORDINATION
Care Management Initial Assessment       RUR: 6% Low   Readmission? No     07/05/24 1529   Service Assessment   Patient Orientation Alert and Oriented;Person;Place;Situation;Self   Cognition Alert   History Provided By Patient   Primary Caregiver Self   Support Systems Children  (Son Adam Stroud [phone])   Patient's Healthcare Decision Maker is: Legal Next of Kin  (Elías Stroud)   PCP Verified by CM Yes  (Dr Michelle)   Last Visit to PCP Within last two years   Prior Functional Level Independent in ADLs/IADLs   Current Functional Level Assistance with the following:;Bathing;Mobility   Can patient return to prior living arrangement Yes   Ability to make needs known: Good   Family able to assist with home care needs: Yes   Would you like for me to discuss the discharge plan with any other family members/significant others, and if so, who? Yes  (Son)   Financial Resources Other (Comment)  (Eulalio Healthkeeper)   Community Resources None   Social/Functional History   Lives With Alone   Type of Home House   Home Layout Two level   Bathroom Shower/Tub Tub/Shower unit;Walk-in shower   Bathroom Toilet Standard   Bathroom Equipment None   Home Equipment None   Receives Help From Family   ADL Assistance Independent   Homemaking Assistance Independent   Ambulation Assistance Independent   Transfer Assistance Independent   Active  Yes   Mode of Transportation Car   Occupation Full time employment   Discharge Planning   Type of Residence House   Living Arrangements Alone   Current Services Prior To Admission None   Potential Assistance Needed N/A   Potential Assistance Purchasing Medications No   Type of Home Care Services OT;PT   Patient expects to be discharged to: House     Patient admitted with a SAH.   CM met with patient to introduce self and explain role. Patient is independent, works full time and drives. She has no previous HH/DME or IPR needs. Patient confirmed her PCP to be Dr Michelle and she sees him every 2

## 2024-07-06 ENCOUNTER — APPOINTMENT (OUTPATIENT)
Facility: HOSPITAL | Age: 64
DRG: 021 | End: 2024-07-06
Payer: COMMERCIAL

## 2024-07-06 LAB
ANION GAP SERPL CALC-SCNC: 6 MMOL/L (ref 5–15)
BUN SERPL-MCNC: 4 MG/DL (ref 6–20)
BUN/CREAT SERPL: 9 (ref 12–20)
CALCIUM SERPL-MCNC: 8.5 MG/DL (ref 8.5–10.1)
CHLORIDE SERPL-SCNC: 106 MMOL/L (ref 97–108)
CO2 SERPL-SCNC: 26 MMOL/L (ref 21–32)
CREAT SERPL-MCNC: 0.44 MG/DL (ref 0.55–1.02)
ERYTHROCYTE [DISTWIDTH] IN BLOOD BY AUTOMATED COUNT: 13 % (ref 11.5–14.5)
GLUCOSE SERPL-MCNC: 108 MG/DL (ref 65–100)
HCT VFR BLD AUTO: 33.9 % (ref 35–47)
HGB BLD-MCNC: 11.4 G/DL (ref 11.5–16)
MAGNESIUM SERPL-MCNC: 2 MG/DL (ref 1.6–2.4)
MCH RBC QN AUTO: 32.6 PG (ref 26–34)
MCHC RBC AUTO-ENTMCNC: 33.6 G/DL (ref 30–36.5)
MCV RBC AUTO: 96.9 FL (ref 80–99)
NRBC # BLD: 0 K/UL (ref 0–0.01)
NRBC BLD-RTO: 0 PER 100 WBC
PHOSPHATE SERPL-MCNC: 3.6 MG/DL (ref 2.6–4.7)
PLATELET # BLD AUTO: 290 K/UL (ref 150–400)
PMV BLD AUTO: 9.6 FL (ref 8.9–12.9)
POTASSIUM SERPL-SCNC: 3.7 MMOL/L (ref 3.5–5.1)
RBC # BLD AUTO: 3.5 M/UL (ref 3.8–5.2)
SODIUM SERPL-SCNC: 138 MMOL/L (ref 136–145)
WBC # BLD AUTO: 8.7 K/UL (ref 3.6–11)

## 2024-07-06 PROCEDURE — 6360000002 HC RX W HCPCS

## 2024-07-06 PROCEDURE — 6370000000 HC RX 637 (ALT 250 FOR IP): Performed by: INTERNAL MEDICINE

## 2024-07-06 PROCEDURE — 80048 BASIC METABOLIC PNL TOTAL CA: CPT

## 2024-07-06 PROCEDURE — 36415 COLL VENOUS BLD VENIPUNCTURE: CPT

## 2024-07-06 PROCEDURE — 83735 ASSAY OF MAGNESIUM: CPT

## 2024-07-06 PROCEDURE — 93886 INTRACRANIAL COMPLETE STUDY: CPT

## 2024-07-06 PROCEDURE — 99233 SBSQ HOSP IP/OBS HIGH 50: CPT

## 2024-07-06 PROCEDURE — 2000000000 HC ICU R&B

## 2024-07-06 PROCEDURE — 85027 COMPLETE CBC AUTOMATED: CPT

## 2024-07-06 PROCEDURE — 6360000002 HC RX W HCPCS: Performed by: NURSE PRACTITIONER

## 2024-07-06 PROCEDURE — 6370000000 HC RX 637 (ALT 250 FOR IP): Performed by: NURSE PRACTITIONER

## 2024-07-06 PROCEDURE — 2580000003 HC RX 258: Performed by: NURSE PRACTITIONER

## 2024-07-06 PROCEDURE — 6370000000 HC RX 637 (ALT 250 FOR IP)

## 2024-07-06 PROCEDURE — 70450 CT HEAD/BRAIN W/O DYE: CPT

## 2024-07-06 PROCEDURE — 84100 ASSAY OF PHOSPHORUS: CPT

## 2024-07-06 PROCEDURE — 2580000003 HC RX 258: Performed by: INTERNAL MEDICINE

## 2024-07-06 RX ORDER — BUTALBITAL, ACETAMINOPHEN AND CAFFEINE 50; 325; 40 MG/1; MG/1; MG/1
1 TABLET ORAL EVERY 6 HOURS PRN
Status: DISCONTINUED | OUTPATIENT
Start: 2024-07-06 | End: 2024-07-16 | Stop reason: HOSPADM

## 2024-07-06 RX ADMIN — SODIUM CHLORIDE, PRESERVATIVE FREE 10 ML: 5 INJECTION INTRAVENOUS at 20:16

## 2024-07-06 RX ADMIN — LEVETIRACETAM 500 MG: 500 TABLET, FILM COATED ORAL at 07:46

## 2024-07-06 RX ADMIN — LEVETIRACETAM 500 MG: 500 TABLET, FILM COATED ORAL at 20:15

## 2024-07-06 RX ADMIN — NIMODIPINE 60 MG: 30 CAPSULE, LIQUID FILLED ORAL at 16:09

## 2024-07-06 RX ADMIN — SODIUM CHLORIDE, PRESERVATIVE FREE 10 ML: 5 INJECTION INTRAVENOUS at 07:46

## 2024-07-06 RX ADMIN — ACETAMINOPHEN 650 MG: 325 TABLET ORAL at 16:14

## 2024-07-06 RX ADMIN — NIMODIPINE 60 MG: 30 CAPSULE, LIQUID FILLED ORAL at 11:40

## 2024-07-06 RX ADMIN — NIMODIPINE 60 MG: 30 CAPSULE, LIQUID FILLED ORAL at 00:07

## 2024-07-06 RX ADMIN — LABETALOL HYDROCHLORIDE 10 MG: 5 INJECTION INTRAVENOUS at 06:43

## 2024-07-06 RX ADMIN — NIMODIPINE 60 MG: 30 CAPSULE, LIQUID FILLED ORAL at 07:45

## 2024-07-06 RX ADMIN — SODIUM CHLORIDE, POTASSIUM CHLORIDE, SODIUM LACTATE AND CALCIUM CHLORIDE: 600; 310; 30; 20 INJECTION, SOLUTION INTRAVENOUS at 03:56

## 2024-07-06 RX ADMIN — NIMODIPINE 60 MG: 30 CAPSULE, LIQUID FILLED ORAL at 20:14

## 2024-07-06 RX ADMIN — BUTALBITAL, ACETAMINOPHEN, AND CAFFEINE 1 TABLET: 50; 325; 40 TABLET ORAL at 11:40

## 2024-07-06 RX ADMIN — METOCLOPRAMIDE 10 MG: 5 INJECTION, SOLUTION INTRAMUSCULAR; INTRAVENOUS at 22:02

## 2024-07-06 RX ADMIN — BUTALBITAL, ACETAMINOPHEN, AND CAFFEINE 1 TABLET: 50; 325; 40 TABLET ORAL at 02:50

## 2024-07-06 RX ADMIN — BUTALBITAL, ACETAMINOPHEN, AND CAFFEINE 1 TABLET: 50; 325; 40 TABLET ORAL at 07:45

## 2024-07-06 RX ADMIN — NIMODIPINE 60 MG: 30 CAPSULE, LIQUID FILLED ORAL at 03:54

## 2024-07-06 RX ADMIN — SODIUM CHLORIDE, POTASSIUM CHLORIDE, SODIUM LACTATE AND CALCIUM CHLORIDE: 600; 310; 30; 20 INJECTION, SOLUTION INTRAVENOUS at 16:13

## 2024-07-06 RX ADMIN — VENLAFAXINE HYDROCHLORIDE 150 MG: 150 CAPSULE, EXTENDED RELEASE ORAL at 07:45

## 2024-07-06 RX ADMIN — ACETAMINOPHEN 650 MG: 325 TABLET ORAL at 22:02

## 2024-07-06 ASSESSMENT — PAIN DESCRIPTION - DESCRIPTORS
DESCRIPTORS: DULL
DESCRIPTORS: ACHING
DESCRIPTORS: DULL
DESCRIPTORS: ACHING

## 2024-07-06 ASSESSMENT — PAIN DESCRIPTION - LOCATION
LOCATION: HEAD

## 2024-07-06 ASSESSMENT — PAIN SCALES - GENERAL
PAINLEVEL_OUTOF10: 3
PAINLEVEL_OUTOF10: 2
PAINLEVEL_OUTOF10: 2
PAINLEVEL_OUTOF10: 0
PAINLEVEL_OUTOF10: 1
PAINLEVEL_OUTOF10: 2
PAINLEVEL_OUTOF10: 3
PAINLEVEL_OUTOF10: 4
PAINLEVEL_OUTOF10: 0

## 2024-07-06 ASSESSMENT — PAIN DESCRIPTION - ORIENTATION
ORIENTATION: ANTERIOR
ORIENTATION: ANTERIOR

## 2024-07-07 ENCOUNTER — APPOINTMENT (OUTPATIENT)
Facility: HOSPITAL | Age: 64
DRG: 021 | End: 2024-07-07
Payer: COMMERCIAL

## 2024-07-07 LAB
ANION GAP SERPL CALC-SCNC: 5 MMOL/L (ref 5–15)
BUN SERPL-MCNC: 5 MG/DL (ref 6–20)
BUN/CREAT SERPL: 11 (ref 12–20)
CALCIUM SERPL-MCNC: 8.9 MG/DL (ref 8.5–10.1)
CHLORIDE SERPL-SCNC: 104 MMOL/L (ref 97–108)
CO2 SERPL-SCNC: 26 MMOL/L (ref 21–32)
CREAT SERPL-MCNC: 0.47 MG/DL (ref 0.55–1.02)
ERYTHROCYTE [DISTWIDTH] IN BLOOD BY AUTOMATED COUNT: 12.8 % (ref 11.5–14.5)
GLUCOSE SERPL-MCNC: 126 MG/DL (ref 65–100)
HCT VFR BLD AUTO: 36.3 % (ref 35–47)
HGB BLD-MCNC: 12.1 G/DL (ref 11.5–16)
MAGNESIUM SERPL-MCNC: 2 MG/DL (ref 1.6–2.4)
MCH RBC QN AUTO: 32.3 PG (ref 26–34)
MCHC RBC AUTO-ENTMCNC: 33.3 G/DL (ref 30–36.5)
MCV RBC AUTO: 96.8 FL (ref 80–99)
NRBC # BLD: 0 K/UL (ref 0–0.01)
NRBC BLD-RTO: 0 PER 100 WBC
PHOSPHATE SERPL-MCNC: 3.6 MG/DL (ref 2.6–4.7)
PLATELET # BLD AUTO: 293 K/UL (ref 150–400)
PMV BLD AUTO: 9.3 FL (ref 8.9–12.9)
POTASSIUM SERPL-SCNC: 3.9 MMOL/L (ref 3.5–5.1)
RBC # BLD AUTO: 3.75 M/UL (ref 3.8–5.2)
SODIUM SERPL-SCNC: 135 MMOL/L (ref 136–145)
WBC # BLD AUTO: 9 K/UL (ref 3.6–11)

## 2024-07-07 PROCEDURE — 84100 ASSAY OF PHOSPHORUS: CPT

## 2024-07-07 PROCEDURE — 6370000000 HC RX 637 (ALT 250 FOR IP): Performed by: NURSE PRACTITIONER

## 2024-07-07 PROCEDURE — 85027 COMPLETE CBC AUTOMATED: CPT

## 2024-07-07 PROCEDURE — 6360000002 HC RX W HCPCS: Performed by: NURSE PRACTITIONER

## 2024-07-07 PROCEDURE — 2580000003 HC RX 258: Performed by: INTERNAL MEDICINE

## 2024-07-07 PROCEDURE — 99291 CRITICAL CARE FIRST HOUR: CPT

## 2024-07-07 PROCEDURE — 6370000000 HC RX 637 (ALT 250 FOR IP): Performed by: INTERNAL MEDICINE

## 2024-07-07 PROCEDURE — 6360000002 HC RX W HCPCS

## 2024-07-07 PROCEDURE — 93886 INTRACRANIAL COMPLETE STUDY: CPT

## 2024-07-07 PROCEDURE — 6360000002 HC RX W HCPCS: Performed by: INTERNAL MEDICINE

## 2024-07-07 PROCEDURE — 2580000003 HC RX 258: Performed by: NURSE PRACTITIONER

## 2024-07-07 PROCEDURE — 36415 COLL VENOUS BLD VENIPUNCTURE: CPT

## 2024-07-07 PROCEDURE — 80048 BASIC METABOLIC PNL TOTAL CA: CPT

## 2024-07-07 PROCEDURE — 83735 ASSAY OF MAGNESIUM: CPT

## 2024-07-07 PROCEDURE — 2000000000 HC ICU R&B

## 2024-07-07 PROCEDURE — 6370000000 HC RX 637 (ALT 250 FOR IP)

## 2024-07-07 RX ORDER — 0.9 % SODIUM CHLORIDE 0.9 %
500 INTRAVENOUS SOLUTION INTRAVENOUS ONCE
Status: COMPLETED | OUTPATIENT
Start: 2024-07-07 | End: 2024-07-07

## 2024-07-07 RX ADMIN — BUTALBITAL, ACETAMINOPHEN, AND CAFFEINE 1 TABLET: 50; 325; 40 TABLET ORAL at 23:14

## 2024-07-07 RX ADMIN — SODIUM CHLORIDE, POTASSIUM CHLORIDE, SODIUM LACTATE AND CALCIUM CHLORIDE: 600; 310; 30; 20 INJECTION, SOLUTION INTRAVENOUS at 02:48

## 2024-07-07 RX ADMIN — LABETALOL HYDROCHLORIDE 10 MG: 5 INJECTION INTRAVENOUS at 00:06

## 2024-07-07 RX ADMIN — ENOXAPARIN SODIUM 40 MG: 100 INJECTION SUBCUTANEOUS at 14:25

## 2024-07-07 RX ADMIN — METOCLOPRAMIDE 10 MG: 5 INJECTION, SOLUTION INTRAMUSCULAR; INTRAVENOUS at 04:21

## 2024-07-07 RX ADMIN — BUTALBITAL, ACETAMINOPHEN, AND CAFFEINE 1 TABLET: 50; 325; 40 TABLET ORAL at 09:41

## 2024-07-07 RX ADMIN — NIMODIPINE 60 MG: 30 CAPSULE, LIQUID FILLED ORAL at 12:05

## 2024-07-07 RX ADMIN — LEVETIRACETAM 500 MG: 500 TABLET, FILM COATED ORAL at 20:43

## 2024-07-07 RX ADMIN — ACETAMINOPHEN 650 MG: 325 TABLET ORAL at 02:24

## 2024-07-07 RX ADMIN — NIMODIPINE 60 MG: 30 CAPSULE, LIQUID FILLED ORAL at 08:34

## 2024-07-07 RX ADMIN — SODIUM CHLORIDE 500 ML: 9 INJECTION, SOLUTION INTRAVENOUS at 16:56

## 2024-07-07 RX ADMIN — VENLAFAXINE HYDROCHLORIDE 150 MG: 150 CAPSULE, EXTENDED RELEASE ORAL at 08:35

## 2024-07-07 RX ADMIN — NIMODIPINE 60 MG: 30 CAPSULE, LIQUID FILLED ORAL at 00:04

## 2024-07-07 RX ADMIN — NIMODIPINE 60 MG: 30 CAPSULE, LIQUID FILLED ORAL at 04:19

## 2024-07-07 RX ADMIN — SODIUM CHLORIDE, POTASSIUM CHLORIDE, SODIUM LACTATE AND CALCIUM CHLORIDE: 600; 310; 30; 20 INJECTION, SOLUTION INTRAVENOUS at 14:35

## 2024-07-07 RX ADMIN — LEVETIRACETAM 500 MG: 500 TABLET, FILM COATED ORAL at 08:35

## 2024-07-07 RX ADMIN — HYDRALAZINE HYDROCHLORIDE 10 MG: 20 INJECTION INTRAMUSCULAR; INTRAVENOUS at 01:50

## 2024-07-07 RX ADMIN — MAGNESIUM SULFATE HEPTAHYDRATE 1000 MG: 1 INJECTION, SOLUTION INTRAVENOUS at 08:44

## 2024-07-07 RX ADMIN — NIMODIPINE 60 MG: 30 CAPSULE, LIQUID FILLED ORAL at 15:54

## 2024-07-07 RX ADMIN — ONDANSETRON 4 MG: 2 INJECTION INTRAMUSCULAR; INTRAVENOUS at 04:45

## 2024-07-07 RX ADMIN — BUTALBITAL, ACETAMINOPHEN, AND CAFFEINE 1 TABLET: 50; 325; 40 TABLET ORAL at 15:54

## 2024-07-07 RX ADMIN — NIMODIPINE 60 MG: 30 CAPSULE, LIQUID FILLED ORAL at 20:43

## 2024-07-07 ASSESSMENT — PAIN DESCRIPTION - ORIENTATION
ORIENTATION: ANTERIOR

## 2024-07-07 ASSESSMENT — PAIN DESCRIPTION - DESCRIPTORS
DESCRIPTORS: DULL
DESCRIPTORS: DULL
DESCRIPTORS: ACHING
DESCRIPTORS: DULL
DESCRIPTORS: DULL

## 2024-07-07 ASSESSMENT — PAIN SCALES - GENERAL
PAINLEVEL_OUTOF10: 3
PAINLEVEL_OUTOF10: 3
PAINLEVEL_OUTOF10: 5
PAINLEVEL_OUTOF10: 5
PAINLEVEL_OUTOF10: 3
PAINLEVEL_OUTOF10: 2
PAINLEVEL_OUTOF10: 2
PAINLEVEL_OUTOF10: 1

## 2024-07-07 ASSESSMENT — PAIN DESCRIPTION - LOCATION
LOCATION: HEAD

## 2024-07-07 ASSESSMENT — PAIN - FUNCTIONAL ASSESSMENT: PAIN_FUNCTIONAL_ASSESSMENT: PREVENTS OR INTERFERES SOME ACTIVE ACTIVITIES AND ADLS

## 2024-07-08 ENCOUNTER — APPOINTMENT (OUTPATIENT)
Facility: HOSPITAL | Age: 64
DRG: 021 | End: 2024-07-08
Payer: COMMERCIAL

## 2024-07-08 LAB
ANION GAP SERPL CALC-SCNC: 3 MMOL/L (ref 5–15)
BUN SERPL-MCNC: 7 MG/DL (ref 6–20)
BUN/CREAT SERPL: 14 (ref 12–20)
CALCIUM SERPL-MCNC: 8 MG/DL (ref 8.5–10.1)
CHLORIDE SERPL-SCNC: 105 MMOL/L (ref 97–108)
CO2 SERPL-SCNC: 28 MMOL/L (ref 21–32)
CREAT SERPL-MCNC: 0.51 MG/DL (ref 0.55–1.02)
ECHO BSA: 1.62 M2
GLUCOSE SERPL-MCNC: 108 MG/DL (ref 65–100)
MAGNESIUM SERPL-MCNC: 2.2 MG/DL (ref 1.6–2.4)
POTASSIUM SERPL-SCNC: 3.6 MMOL/L (ref 3.5–5.1)
SODIUM SERPL-SCNC: 136 MMOL/L (ref 136–145)
VAS BASILAR ARTERY EDV: 22.5 CM/S
VAS BASILAR ARTERY EDV: 26.1 CM/S
VAS BASILAR ARTERY EDV: 27 CM/S
VAS BASILAR ARTERY MEAN VEL: 35 CM/S
VAS BASILAR ARTERY MEAN VEL: 41 CM/S
VAS BASILAR ARTERY MEAN VEL: 43 CM/S
VAS BASILAR ARTERY PSV: 59.9 CM/S
VAS BASILAR ARTERY PSV: 70.1 CM/S
VAS BASILAR ARTERY PSV: 74.2 CM/S
VAS LEFT EX ICA MEAN VEL: 34 CM/S
VAS LEFT EX ICA MEAN VEL: 39 CM/S
VAS LEFT EX ICA MEAN VEL: 40 CM/S
VAS LEFT ICA DIST EDV: 20.4 CM/S
VAS LEFT ICA DIST EDV: 22.3 CM/S
VAS LEFT ICA DIST EDV: 23.1 CM/S
VAS LEFT ICA DIST PSV: 63.3 CM/S
VAS LEFT ICA DIST PSV: 73.3 CM/S
VAS LEFT ICA DIST PSV: 74.1 CM/S
VAS LEFT LINDEGAARD RATIO: 0.8
VAS LEFT LINDEGAARD RATIO: 0.97
VAS LEFT LINDEGAARD RATIO: 1
VAS LEFT MCA 1 EDV: 18.5 CM/S
VAS LEFT MCA 1 EDV: 19.2 CM/S
VAS LEFT MCA 1 EDV: 22.3 CM/S
VAS LEFT MCA 1 MEAN VEL: 31.1 CM/S
VAS LEFT MCA 1 MEAN VEL: 33.1 CM/S
VAS LEFT MCA 1 MEAN VEL: 37.8 CM/S
VAS LEFT MCA 1 PSV: 54.8 CM/S
VAS LEFT MCA 1 PSV: 62.4 CM/S
VAS LEFT MCA 1 PSV: 68.9 CM/S
VAS LEFT PCA 1 EDV: 18.4 CM/S
VAS LEFT PCA 1 EDV: 31.6 CM/S
VAS LEFT PCA 1 EDV: 33.5 CM/S
VAS LEFT PCA 1 MEAN VEL: 30.5 CM/S
VAS LEFT PCA 1 MEAN VEL: 48.4 CM/S
VAS LEFT PCA 1 MEAN VEL: 51.4 CM/S
VAS LEFT PCA 1 PSV: 54.6 CM/S
VAS LEFT PCA 1 PSV: 81.9 CM/S
VAS LEFT PCA 1 PSV: 87.3 CM/S
VAS RIGHT EX ICA MEAN VEL: 31 CM/S
VAS RIGHT EX ICA MEAN VEL: 32 CM/S
VAS RIGHT EX ICA MEAN VEL: 39 CM/S
VAS RIGHT ICA DIST EDV: 14.5 CM/S
VAS RIGHT ICA DIST EDV: 16.7 CM/S
VAS RIGHT ICA DIST EDV: 21.3 CM/S
VAS RIGHT ICA DIST PSV: 59.6 CM/S
VAS RIGHT ICA DIST PSV: 66.3 CM/S
VAS RIGHT ICA DIST PSV: 75.2 CM/S
VAS RIGHT LINDEGAARD RATIO: 1.1
VAS RIGHT LINDEGAARD RATIO: 1.2
VAS RIGHT LINDEGAARD RATIO: 1.3
VAS RIGHT MCA 1 EDV: 22.2 CM/S
VAS RIGHT MCA 1 EDV: 26.2 CM/S
VAS RIGHT MCA 1 EDV: 26.7 CM/S
VAS RIGHT MCA 1 MEAN VEL: 38.3 CM/S
VAS RIGHT MCA 1 MEAN VEL: 38.6 CM/S
VAS RIGHT MCA 1 MEAN VEL: 43.5 CM/S
VAS RIGHT MCA 1 PSV: 62.4 CM/S
VAS RIGHT MCA 1 PSV: 71.5 CM/S
VAS RIGHT MCA 1 PSV: 77.2 CM/S
VAS RIGHT PCA 1 EDV: 14.2 CM/S
VAS RIGHT PCA 1 EDV: 18.9 CM/S
VAS RIGHT PCA 1 EDV: 23.7 CM/S
VAS RIGHT PCA 1 MEAN VEL: 25.8 CM/S
VAS RIGHT PCA 1 MEAN VEL: 35.2 CM/S
VAS RIGHT PCA 1 MEAN VEL: 37 CM/S
VAS RIGHT PCA 1 PSV: 48.9 CM/S
VAS RIGHT PCA 1 PSV: 58.1 CM/S
VAS RIGHT PCA 1 PSV: 73.3 CM/S
VAS RIGHT VERTEBRAL EDV TCD: 16.1 CM/S
VAS RIGHT VERTEBRAL EDV TCD: 17.9 CM/S
VAS RIGHT VERTEBRAL MEAN VEL: 26.4 CM/S
VAS RIGHT VERTEBRAL MEAN VEL: 27.7 CM/S
VAS RIGHT VERTEBRAL PSV TCD: 43.5 CM/S
VAS RIGHT VERTEBRAL PSV TCD: 50.8 CM/S

## 2024-07-08 PROCEDURE — APPNB15 APP NON BILLABLE TIME 0-15 MINS: Performed by: PHYSICIAN ASSISTANT

## 2024-07-08 PROCEDURE — 2580000003 HC RX 258: Performed by: INTERNAL MEDICINE

## 2024-07-08 PROCEDURE — 6370000000 HC RX 637 (ALT 250 FOR IP)

## 2024-07-08 PROCEDURE — 6370000000 HC RX 637 (ALT 250 FOR IP): Performed by: INTERNAL MEDICINE

## 2024-07-08 PROCEDURE — 97535 SELF CARE MNGMENT TRAINING: CPT

## 2024-07-08 PROCEDURE — 2580000003 HC RX 258: Performed by: NURSE PRACTITIONER

## 2024-07-08 PROCEDURE — 2000000000 HC ICU R&B

## 2024-07-08 PROCEDURE — 36415 COLL VENOUS BLD VENIPUNCTURE: CPT

## 2024-07-08 PROCEDURE — 6370000000 HC RX 637 (ALT 250 FOR IP): Performed by: NURSE PRACTITIONER

## 2024-07-08 PROCEDURE — 80048 BASIC METABOLIC PNL TOTAL CA: CPT

## 2024-07-08 PROCEDURE — 93886 INTRACRANIAL COMPLETE STUDY: CPT

## 2024-07-08 PROCEDURE — 6360000002 HC RX W HCPCS: Performed by: INTERNAL MEDICINE

## 2024-07-08 PROCEDURE — 83735 ASSAY OF MAGNESIUM: CPT

## 2024-07-08 RX ORDER — SODIUM CHLORIDE 9 MG/ML
INJECTION, SOLUTION INTRAVENOUS CONTINUOUS
Status: DISCONTINUED | OUTPATIENT
Start: 2024-07-08 | End: 2024-07-12

## 2024-07-08 RX ORDER — POLYETHYLENE GLYCOL 3350 17 G/17G
17 POWDER, FOR SOLUTION ORAL DAILY
Status: DISCONTINUED | OUTPATIENT
Start: 2024-07-08 | End: 2024-07-16 | Stop reason: HOSPADM

## 2024-07-08 RX ADMIN — NIMODIPINE 60 MG: 30 CAPSULE, LIQUID FILLED ORAL at 15:25

## 2024-07-08 RX ADMIN — NIMODIPINE 60 MG: 30 CAPSULE, LIQUID FILLED ORAL at 23:32

## 2024-07-08 RX ADMIN — LEVETIRACETAM 500 MG: 500 TABLET, FILM COATED ORAL at 20:53

## 2024-07-08 RX ADMIN — NIMODIPINE 60 MG: 30 CAPSULE, LIQUID FILLED ORAL at 07:50

## 2024-07-08 RX ADMIN — BUTALBITAL, ACETAMINOPHEN, AND CAFFEINE 1 TABLET: 50; 325; 40 TABLET ORAL at 04:17

## 2024-07-08 RX ADMIN — NIMODIPINE 60 MG: 30 CAPSULE, LIQUID FILLED ORAL at 04:17

## 2024-07-08 RX ADMIN — SODIUM CHLORIDE, PRESERVATIVE FREE 40 ML: 5 INJECTION INTRAVENOUS at 09:52

## 2024-07-08 RX ADMIN — NIMODIPINE 60 MG: 30 CAPSULE, LIQUID FILLED ORAL at 01:32

## 2024-07-08 RX ADMIN — BUTALBITAL, ACETAMINOPHEN, AND CAFFEINE 1 TABLET: 50; 325; 40 TABLET ORAL at 20:58

## 2024-07-08 RX ADMIN — SODIUM CHLORIDE: 9 INJECTION, SOLUTION INTRAVENOUS at 20:53

## 2024-07-08 RX ADMIN — VENLAFAXINE HYDROCHLORIDE 150 MG: 150 CAPSULE, EXTENDED RELEASE ORAL at 07:51

## 2024-07-08 RX ADMIN — NIMODIPINE 60 MG: 30 CAPSULE, LIQUID FILLED ORAL at 11:40

## 2024-07-08 RX ADMIN — BUTALBITAL, ACETAMINOPHEN, AND CAFFEINE 1 TABLET: 50; 325; 40 TABLET ORAL at 15:25

## 2024-07-08 RX ADMIN — BUTALBITAL, ACETAMINOPHEN, AND CAFFEINE 1 TABLET: 50; 325; 40 TABLET ORAL at 09:52

## 2024-07-08 RX ADMIN — LEVETIRACETAM 500 MG: 500 TABLET, FILM COATED ORAL at 07:51

## 2024-07-08 RX ADMIN — ENOXAPARIN SODIUM 40 MG: 100 INJECTION SUBCUTANEOUS at 15:25

## 2024-07-08 RX ADMIN — SODIUM CHLORIDE, POTASSIUM CHLORIDE, SODIUM LACTATE AND CALCIUM CHLORIDE: 600; 310; 30; 20 INJECTION, SOLUTION INTRAVENOUS at 00:46

## 2024-07-08 RX ADMIN — NIMODIPINE 60 MG: 30 CAPSULE, LIQUID FILLED ORAL at 20:52

## 2024-07-08 RX ADMIN — SODIUM CHLORIDE, POTASSIUM CHLORIDE, SODIUM LACTATE AND CALCIUM CHLORIDE: 600; 310; 30; 20 INJECTION, SOLUTION INTRAVENOUS at 11:40

## 2024-07-08 ASSESSMENT — PAIN SCALES - GENERAL
PAINLEVEL_OUTOF10: 8
PAINLEVEL_OUTOF10: 4
PAINLEVEL_OUTOF10: 2
PAINLEVEL_OUTOF10: 8
PAINLEVEL_OUTOF10: 2
PAINLEVEL_OUTOF10: 4
PAINLEVEL_OUTOF10: 3
PAINLEVEL_OUTOF10: 4

## 2024-07-08 ASSESSMENT — PAIN DESCRIPTION - LOCATION
LOCATION: HEAD

## 2024-07-08 ASSESSMENT — PAIN DESCRIPTION - DESCRIPTORS
DESCRIPTORS: ACHING

## 2024-07-09 ENCOUNTER — APPOINTMENT (OUTPATIENT)
Facility: HOSPITAL | Age: 64
DRG: 021 | End: 2024-07-09
Payer: COMMERCIAL

## 2024-07-09 LAB
ANION GAP SERPL CALC-SCNC: 7 MMOL/L (ref 5–15)
BUN SERPL-MCNC: 7 MG/DL (ref 6–20)
BUN/CREAT SERPL: 16 (ref 12–20)
CALCIUM SERPL-MCNC: 8.1 MG/DL (ref 8.5–10.1)
CHLORIDE SERPL-SCNC: 104 MMOL/L (ref 97–108)
CO2 SERPL-SCNC: 23 MMOL/L (ref 21–32)
CREAT SERPL-MCNC: 0.43 MG/DL (ref 0.55–1.02)
ERYTHROCYTE [DISTWIDTH] IN BLOOD BY AUTOMATED COUNT: 12.6 % (ref 11.5–14.5)
GLUCOSE SERPL-MCNC: 108 MG/DL (ref 65–100)
HCT VFR BLD AUTO: 34.3 % (ref 35–47)
HGB BLD-MCNC: 11.5 G/DL (ref 11.5–16)
MAGNESIUM SERPL-MCNC: 2 MG/DL (ref 1.6–2.4)
MCH RBC QN AUTO: 32.7 PG (ref 26–34)
MCHC RBC AUTO-ENTMCNC: 33.5 G/DL (ref 30–36.5)
MCV RBC AUTO: 97.4 FL (ref 80–99)
NRBC # BLD: 0 K/UL (ref 0–0.01)
NRBC BLD-RTO: 0 PER 100 WBC
PHOSPHATE SERPL-MCNC: 3 MG/DL (ref 2.6–4.7)
PLATELET # BLD AUTO: 330 K/UL (ref 150–400)
PMV BLD AUTO: 9.2 FL (ref 8.9–12.9)
POTASSIUM SERPL-SCNC: 4 MMOL/L (ref 3.5–5.1)
RBC # BLD AUTO: 3.52 M/UL (ref 3.8–5.2)
SODIUM SERPL-SCNC: 134 MMOL/L (ref 136–145)
WBC # BLD AUTO: 7.4 K/UL (ref 3.6–11)

## 2024-07-09 PROCEDURE — 83735 ASSAY OF MAGNESIUM: CPT

## 2024-07-09 PROCEDURE — 2000000000 HC ICU R&B

## 2024-07-09 PROCEDURE — 6370000000 HC RX 637 (ALT 250 FOR IP): Performed by: NURSE PRACTITIONER

## 2024-07-09 PROCEDURE — 2580000003 HC RX 258: Performed by: INTERNAL MEDICINE

## 2024-07-09 PROCEDURE — 97116 GAIT TRAINING THERAPY: CPT

## 2024-07-09 PROCEDURE — 6360000002 HC RX W HCPCS: Performed by: INTERNAL MEDICINE

## 2024-07-09 PROCEDURE — 2580000003 HC RX 258: Performed by: NURSE PRACTITIONER

## 2024-07-09 PROCEDURE — 85027 COMPLETE CBC AUTOMATED: CPT

## 2024-07-09 PROCEDURE — 6370000000 HC RX 637 (ALT 250 FOR IP)

## 2024-07-09 PROCEDURE — 84100 ASSAY OF PHOSPHORUS: CPT

## 2024-07-09 PROCEDURE — 6370000000 HC RX 637 (ALT 250 FOR IP): Performed by: INTERNAL MEDICINE

## 2024-07-09 PROCEDURE — 36415 COLL VENOUS BLD VENIPUNCTURE: CPT

## 2024-07-09 PROCEDURE — 99232 SBSQ HOSP IP/OBS MODERATE 35: CPT

## 2024-07-09 PROCEDURE — 93886 INTRACRANIAL COMPLETE STUDY: CPT

## 2024-07-09 PROCEDURE — 80048 BASIC METABOLIC PNL TOTAL CA: CPT

## 2024-07-09 PROCEDURE — 97535 SELF CARE MNGMENT TRAINING: CPT

## 2024-07-09 RX ORDER — LIDOCAINE 4 G/G
1 PATCH TOPICAL 2 TIMES DAILY PRN
Status: DISCONTINUED | OUTPATIENT
Start: 2024-07-09 | End: 2024-07-16 | Stop reason: HOSPADM

## 2024-07-09 RX ORDER — GABAPENTIN 100 MG/1
200 CAPSULE ORAL 2 TIMES DAILY
Status: DISCONTINUED | OUTPATIENT
Start: 2024-07-09 | End: 2024-07-15

## 2024-07-09 RX ORDER — SODIUM CHLORIDE 9 MG/ML
INJECTION, SOLUTION INTRAVENOUS ONCE
Status: COMPLETED | OUTPATIENT
Start: 2024-07-09 | End: 2024-07-10

## 2024-07-09 RX ADMIN — NIMODIPINE 60 MG: 30 CAPSULE, LIQUID FILLED ORAL at 15:34

## 2024-07-09 RX ADMIN — BUTALBITAL, ACETAMINOPHEN, AND CAFFEINE 1 TABLET: 50; 325; 40 TABLET ORAL at 04:06

## 2024-07-09 RX ADMIN — NIMODIPINE 60 MG: 30 CAPSULE, LIQUID FILLED ORAL at 20:32

## 2024-07-09 RX ADMIN — SODIUM CHLORIDE: 9 INJECTION, SOLUTION INTRAVENOUS at 17:30

## 2024-07-09 RX ADMIN — ENOXAPARIN SODIUM 40 MG: 100 INJECTION SUBCUTANEOUS at 12:03

## 2024-07-09 RX ADMIN — GABAPENTIN 200 MG: 100 CAPSULE ORAL at 21:34

## 2024-07-09 RX ADMIN — LEVETIRACETAM 500 MG: 500 TABLET, FILM COATED ORAL at 20:32

## 2024-07-09 RX ADMIN — NIMODIPINE 60 MG: 30 CAPSULE, LIQUID FILLED ORAL at 04:04

## 2024-07-09 RX ADMIN — POLYETHYLENE GLYCOL 3350 17 G: 17 POWDER, FOR SOLUTION ORAL at 07:55

## 2024-07-09 RX ADMIN — SODIUM CHLORIDE, PRESERVATIVE FREE 40 ML: 5 INJECTION INTRAVENOUS at 09:49

## 2024-07-09 RX ADMIN — VENLAFAXINE HYDROCHLORIDE 150 MG: 150 CAPSULE, EXTENDED RELEASE ORAL at 07:55

## 2024-07-09 RX ADMIN — BUTALBITAL, ACETAMINOPHEN, AND CAFFEINE 1 TABLET: 50; 325; 40 TABLET ORAL at 15:34

## 2024-07-09 RX ADMIN — SODIUM CHLORIDE: 9 INJECTION, SOLUTION INTRAVENOUS at 06:59

## 2024-07-09 RX ADMIN — NIMODIPINE 60 MG: 30 CAPSULE, LIQUID FILLED ORAL at 07:54

## 2024-07-09 RX ADMIN — SODIUM CHLORIDE, PRESERVATIVE FREE 10 ML: 5 INJECTION INTRAVENOUS at 20:33

## 2024-07-09 RX ADMIN — NIMODIPINE 60 MG: 30 CAPSULE, LIQUID FILLED ORAL at 12:02

## 2024-07-09 RX ADMIN — LEVETIRACETAM 500 MG: 500 TABLET, FILM COATED ORAL at 07:54

## 2024-07-09 RX ADMIN — SODIUM CHLORIDE: 9 INJECTION, SOLUTION INTRAVENOUS at 23:13

## 2024-07-09 RX ADMIN — BUTALBITAL, ACETAMINOPHEN, AND CAFFEINE 1 TABLET: 50; 325; 40 TABLET ORAL at 09:49

## 2024-07-09 RX ADMIN — BUTALBITAL, ACETAMINOPHEN, AND CAFFEINE 1 TABLET: 50; 325; 40 TABLET ORAL at 21:25

## 2024-07-09 ASSESSMENT — PAIN DESCRIPTION - LOCATION
LOCATION: HEAD

## 2024-07-09 ASSESSMENT — PAIN DESCRIPTION - DESCRIPTORS
DESCRIPTORS: ACHING

## 2024-07-09 ASSESSMENT — PAIN DESCRIPTION - ORIENTATION
ORIENTATION: MID;LOWER
ORIENTATION: LOWER

## 2024-07-09 ASSESSMENT — PAIN SCALES - GENERAL
PAINLEVEL_OUTOF10: 3
PAINLEVEL_OUTOF10: 7
PAINLEVEL_OUTOF10: 0
PAINLEVEL_OUTOF10: 3
PAINLEVEL_OUTOF10: 4
PAINLEVEL_OUTOF10: 10

## 2024-07-10 ENCOUNTER — APPOINTMENT (OUTPATIENT)
Facility: HOSPITAL | Age: 64
DRG: 021 | End: 2024-07-10
Payer: COMMERCIAL

## 2024-07-10 LAB
ANION GAP SERPL CALC-SCNC: 5 MMOL/L (ref 5–15)
BUN SERPL-MCNC: 6 MG/DL (ref 6–20)
BUN/CREAT SERPL: 11 (ref 12–20)
CALCIUM SERPL-MCNC: 8.6 MG/DL (ref 8.5–10.1)
CHLORIDE SERPL-SCNC: 104 MMOL/L (ref 97–108)
CO2 SERPL-SCNC: 25 MMOL/L (ref 21–32)
CREAT SERPL-MCNC: 0.53 MG/DL (ref 0.55–1.02)
ERYTHROCYTE [DISTWIDTH] IN BLOOD BY AUTOMATED COUNT: 12.7 % (ref 11.5–14.5)
GLUCOSE SERPL-MCNC: 102 MG/DL (ref 65–100)
HCT VFR BLD AUTO: 36.3 % (ref 35–47)
HGB BLD-MCNC: 12.2 G/DL (ref 11.5–16)
MAGNESIUM SERPL-MCNC: 2.1 MG/DL (ref 1.6–2.4)
MCH RBC QN AUTO: 32.5 PG (ref 26–34)
MCHC RBC AUTO-ENTMCNC: 33.6 G/DL (ref 30–36.5)
MCV RBC AUTO: 96.8 FL (ref 80–99)
NRBC # BLD: 0 K/UL (ref 0–0.01)
NRBC BLD-RTO: 0 PER 100 WBC
PHOSPHATE SERPL-MCNC: 2.9 MG/DL (ref 2.6–4.7)
PLATELET # BLD AUTO: 357 K/UL (ref 150–400)
PMV BLD AUTO: 9.2 FL (ref 8.9–12.9)
POTASSIUM SERPL-SCNC: 3.9 MMOL/L (ref 3.5–5.1)
RBC # BLD AUTO: 3.75 M/UL (ref 3.8–5.2)
SODIUM SERPL-SCNC: 134 MMOL/L (ref 136–145)
WBC # BLD AUTO: 7 K/UL (ref 3.6–11)

## 2024-07-10 PROCEDURE — 80048 BASIC METABOLIC PNL TOTAL CA: CPT

## 2024-07-10 PROCEDURE — 6370000000 HC RX 637 (ALT 250 FOR IP)

## 2024-07-10 PROCEDURE — 97116 GAIT TRAINING THERAPY: CPT

## 2024-07-10 PROCEDURE — 36415 COLL VENOUS BLD VENIPUNCTURE: CPT

## 2024-07-10 PROCEDURE — 6370000000 HC RX 637 (ALT 250 FOR IP): Performed by: NURSE PRACTITIONER

## 2024-07-10 PROCEDURE — 93886 INTRACRANIAL COMPLETE STUDY: CPT

## 2024-07-10 PROCEDURE — 2580000003 HC RX 258: Performed by: INTERNAL MEDICINE

## 2024-07-10 PROCEDURE — 6370000000 HC RX 637 (ALT 250 FOR IP): Performed by: INTERNAL MEDICINE

## 2024-07-10 PROCEDURE — 6360000002 HC RX W HCPCS: Performed by: INTERNAL MEDICINE

## 2024-07-10 PROCEDURE — 2000000000 HC ICU R&B

## 2024-07-10 PROCEDURE — 84100 ASSAY OF PHOSPHORUS: CPT

## 2024-07-10 PROCEDURE — 97530 THERAPEUTIC ACTIVITIES: CPT

## 2024-07-10 PROCEDURE — 99232 SBSQ HOSP IP/OBS MODERATE 35: CPT

## 2024-07-10 PROCEDURE — 85027 COMPLETE CBC AUTOMATED: CPT

## 2024-07-10 PROCEDURE — 83735 ASSAY OF MAGNESIUM: CPT

## 2024-07-10 RX ORDER — SODIUM CHLORIDE 1 G/1
1 TABLET ORAL
Status: DISCONTINUED | OUTPATIENT
Start: 2024-07-10 | End: 2024-07-15

## 2024-07-10 RX ADMIN — ENOXAPARIN SODIUM 40 MG: 100 INJECTION SUBCUTANEOUS at 12:45

## 2024-07-10 RX ADMIN — NIMODIPINE 60 MG: 30 CAPSULE, LIQUID FILLED ORAL at 00:18

## 2024-07-10 RX ADMIN — SODIUM CHLORIDE 1 G: 1 TABLET ORAL at 16:02

## 2024-07-10 RX ADMIN — BUTALBITAL, ACETAMINOPHEN, AND CAFFEINE 1 TABLET: 50; 325; 40 TABLET ORAL at 17:38

## 2024-07-10 RX ADMIN — NIMODIPINE 60 MG: 30 CAPSULE, LIQUID FILLED ORAL at 09:01

## 2024-07-10 RX ADMIN — BUTALBITAL, ACETAMINOPHEN, AND CAFFEINE 1 TABLET: 50; 325; 40 TABLET ORAL at 12:45

## 2024-07-10 RX ADMIN — BUTALBITAL, ACETAMINOPHEN, AND CAFFEINE 1 TABLET: 50; 325; 40 TABLET ORAL at 03:53

## 2024-07-10 RX ADMIN — GABAPENTIN 200 MG: 100 CAPSULE ORAL at 09:01

## 2024-07-10 RX ADMIN — SODIUM CHLORIDE: 9 INJECTION, SOLUTION INTRAVENOUS at 19:02

## 2024-07-10 RX ADMIN — SODIUM CHLORIDE: 9 INJECTION, SOLUTION INTRAVENOUS at 04:01

## 2024-07-10 RX ADMIN — NIMODIPINE 60 MG: 30 CAPSULE, LIQUID FILLED ORAL at 16:00

## 2024-07-10 RX ADMIN — NIMODIPINE 60 MG: 30 CAPSULE, LIQUID FILLED ORAL at 12:45

## 2024-07-10 RX ADMIN — SODIUM CHLORIDE 1 G: 1 TABLET ORAL at 12:45

## 2024-07-10 RX ADMIN — SODIUM CHLORIDE, PRESERVATIVE FREE 10 ML: 5 INJECTION INTRAVENOUS at 20:50

## 2024-07-10 RX ADMIN — SODIUM CHLORIDE, PRESERVATIVE FREE 40 ML: 5 INJECTION INTRAVENOUS at 09:01

## 2024-07-10 RX ADMIN — VENLAFAXINE HYDROCHLORIDE 150 MG: 150 CAPSULE, EXTENDED RELEASE ORAL at 09:01

## 2024-07-10 RX ADMIN — NIMODIPINE 60 MG: 30 CAPSULE, LIQUID FILLED ORAL at 20:43

## 2024-07-10 RX ADMIN — NIMODIPINE 60 MG: 30 CAPSULE, LIQUID FILLED ORAL at 03:53

## 2024-07-10 RX ADMIN — GABAPENTIN 200 MG: 100 CAPSULE ORAL at 20:43

## 2024-07-10 ASSESSMENT — PAIN DESCRIPTION - DESCRIPTORS
DESCRIPTORS: ACHING

## 2024-07-10 ASSESSMENT — PAIN DESCRIPTION - LOCATION
LOCATION: HEAD

## 2024-07-10 ASSESSMENT — PAIN SCALES - GENERAL
PAINLEVEL_OUTOF10: 5
PAINLEVEL_OUTOF10: 2
PAINLEVEL_OUTOF10: 4
PAINLEVEL_OUTOF10: 4

## 2024-07-10 ASSESSMENT — PAIN DESCRIPTION - ORIENTATION: ORIENTATION: LOWER

## 2024-07-10 NOTE — CARE COORDINATION
Transition of Care Plan:    RUR: 6% Low   Prior Level of Functioning: Independent  Disposition: Home with family support   Follow up appointments: PCP, Neuro   DME needed: NA  Transportation at discharge: Family   IM/IMM Medicare/ letter given: NA  Is patient a [place] and connected with VA? No  Caregiver Contact: Son Adam Stroud [phone]  Discharge Caregiver contacted prior to discharge? No  Care Conference needed? No  Barriers to discharge:    Patient is s/p coil embolization of A-comm  aneurysm   Day 8/21 of [place]   QD TCD's   NICOM QS  Care management is continuing to follow.  Maira Ruby RN,Care Management

## 2024-07-11 ENCOUNTER — APPOINTMENT (OUTPATIENT)
Facility: HOSPITAL | Age: 64
DRG: 021 | End: 2024-07-11
Payer: COMMERCIAL

## 2024-07-11 LAB
ANION GAP SERPL CALC-SCNC: 7 MMOL/L (ref 5–15)
BUN SERPL-MCNC: 6 MG/DL (ref 6–20)
BUN/CREAT SERPL: 11 (ref 12–20)
CALCIUM SERPL-MCNC: 8.8 MG/DL (ref 8.5–10.1)
CHLORIDE SERPL-SCNC: 106 MMOL/L (ref 97–108)
CO2 SERPL-SCNC: 23 MMOL/L (ref 21–32)
CREAT SERPL-MCNC: 0.57 MG/DL (ref 0.55–1.02)
ECHO BSA: 1.62 M2
ECHO BSA: 1.62 M2
ERYTHROCYTE [DISTWIDTH] IN BLOOD BY AUTOMATED COUNT: 12.5 % (ref 11.5–14.5)
GLUCOSE SERPL-MCNC: 106 MG/DL (ref 65–100)
HCT VFR BLD AUTO: 34.1 % (ref 35–47)
HGB BLD-MCNC: 11.4 G/DL (ref 11.5–16)
MAGNESIUM SERPL-MCNC: 2 MG/DL (ref 1.6–2.4)
MCH RBC QN AUTO: 32.2 PG (ref 26–34)
MCHC RBC AUTO-ENTMCNC: 33.4 G/DL (ref 30–36.5)
MCV RBC AUTO: 96.3 FL (ref 80–99)
NRBC # BLD: 0 K/UL (ref 0–0.01)
NRBC BLD-RTO: 0 PER 100 WBC
PHOSPHATE SERPL-MCNC: 3.2 MG/DL (ref 2.6–4.7)
PLATELET # BLD AUTO: 357 K/UL (ref 150–400)
PMV BLD AUTO: 9.3 FL (ref 8.9–12.9)
POTASSIUM SERPL-SCNC: 3.7 MMOL/L (ref 3.5–5.1)
RBC # BLD AUTO: 3.54 M/UL (ref 3.8–5.2)
SODIUM SERPL-SCNC: 136 MMOL/L (ref 136–145)
VAS BASILAR ARTERY EDV: 22.2 CM/S
VAS BASILAR ARTERY EDV: 27.4 CM/S
VAS BASILAR ARTERY MEAN VEL: 35 CM/S
VAS BASILAR ARTERY MEAN VEL: 42 CM/S
VAS BASILAR ARTERY PSV: 62.4 CM/S
VAS BASILAR ARTERY PSV: 72.8 CM/S
VAS LEFT ACA EDV: 31.6 CM/S
VAS LEFT ACA MEAN VEL: 46.8 CM/S
VAS LEFT ACA PSV: 77.3 CM/S
VAS LEFT EX ICA MEAN VEL: 37 CM/S
VAS LEFT EX ICA MEAN VEL: 39 CM/S
VAS LEFT ICA DIST EDV: 20.2 CM/S
VAS LEFT ICA DIST EDV: 21.3 CM/S
VAS LEFT ICA DIST PSV: 67.9 CM/S
VAS LEFT ICA DIST PSV: 76.8 CM/S
VAS LEFT LINDEGAARD RATIO: 0.9
VAS LEFT LINDEGAARD RATIO: 1.1
VAS LEFT MCA 1 EDV: 21.6 CM/S
VAS LEFT MCA 1 EDV: 26.5 CM/S
VAS LEFT MCA 1 MEAN VEL: 32.9 CM/S
VAS LEFT MCA 1 MEAN VEL: 41.6 CM/S
VAS LEFT MCA 1 PSV: 55.4 CM/S
VAS LEFT MCA 1 PSV: 71.9 CM/S
VAS LEFT PCA 1 EDV: 13.6 CM/S
VAS LEFT PCA 1 EDV: 16.8 CM/S
VAS LEFT PCA 1 MEAN VEL: 23.1 CM/S
VAS LEFT PCA 1 MEAN VEL: 26.2 CM/S
VAS LEFT PCA 1 PSV: 42.1 CM/S
VAS LEFT PCA 1 PSV: 44.9 CM/S
VAS LEFT VERTEBRAL EDV TCD: 16.2 CM/S
VAS LEFT VERTEBRAL MEAN VEL: 25.3 CM/S
VAS LEFT VERTEBRAL PSV TCD: 43.4 CM/S
VAS RIGHT ACA EDV: 52.2 CM/S
VAS RIGHT ACA MEAN VEL: 71.6 CM/S
VAS RIGHT ACA PSV: 110.5 CM/S
VAS RIGHT EX ICA MEAN VEL: 36 CM/S
VAS RIGHT EX ICA MEAN VEL: 38 CM/S
VAS RIGHT ICA DIST EDV: 18.6 CM/S
VAS RIGHT ICA DIST EDV: 19.5 CM/S
VAS RIGHT ICA DIST PSV: 70.3 CM/S
VAS RIGHT ICA DIST PSV: 73.3 CM/S
VAS RIGHT LINDEGAARD RATIO: 0.9
VAS RIGHT LINDEGAARD RATIO: 1.2
VAS RIGHT MCA 1 EDV: 19.7 CM/S
VAS RIGHT MCA 1 EDV: 28.9 CM/S
VAS RIGHT MCA 1 MEAN VEL: 33.2 CM/S
VAS RIGHT MCA 1 MEAN VEL: 43.5 CM/S
VAS RIGHT MCA 1 PSV: 60.2 CM/S
VAS RIGHT MCA 1 PSV: 72.7 CM/S
VAS RIGHT PCA 1 EDV: 16.3 CM/S
VAS RIGHT PCA 1 EDV: 17 CM/S
VAS RIGHT PCA 1 MEAN VEL: 26.2 CM/S
VAS RIGHT PCA 1 MEAN VEL: 28 CM/S
VAS RIGHT PCA 1 PSV: 44.6 CM/S
VAS RIGHT PCA 1 PSV: 51.3 CM/S
VAS RIGHT VERTEBRAL EDV TCD: 14.9 CM/S
VAS RIGHT VERTEBRAL EDV TCD: 17.5 CM/S
VAS RIGHT VERTEBRAL MEAN VEL: 27.7 CM/S
VAS RIGHT VERTEBRAL MEAN VEL: 28.3 CM/S
VAS RIGHT VERTEBRAL PSV TCD: 49.8 CM/S
VAS RIGHT VERTEBRAL PSV TCD: 53.2 CM/S
WBC # BLD AUTO: 7.8 K/UL (ref 3.6–11)

## 2024-07-11 PROCEDURE — 84100 ASSAY OF PHOSPHORUS: CPT

## 2024-07-11 PROCEDURE — 36415 COLL VENOUS BLD VENIPUNCTURE: CPT

## 2024-07-11 PROCEDURE — 6370000000 HC RX 637 (ALT 250 FOR IP): Performed by: INTERNAL MEDICINE

## 2024-07-11 PROCEDURE — 6370000000 HC RX 637 (ALT 250 FOR IP): Performed by: NURSE PRACTITIONER

## 2024-07-11 PROCEDURE — 99232 SBSQ HOSP IP/OBS MODERATE 35: CPT

## 2024-07-11 PROCEDURE — 93886 INTRACRANIAL COMPLETE STUDY: CPT

## 2024-07-11 PROCEDURE — 2060000000 HC ICU INTERMEDIATE R&B

## 2024-07-11 PROCEDURE — 97535 SELF CARE MNGMENT TRAINING: CPT

## 2024-07-11 PROCEDURE — 83735 ASSAY OF MAGNESIUM: CPT

## 2024-07-11 PROCEDURE — 80048 BASIC METABOLIC PNL TOTAL CA: CPT

## 2024-07-11 PROCEDURE — 85027 COMPLETE CBC AUTOMATED: CPT

## 2024-07-11 PROCEDURE — 97530 THERAPEUTIC ACTIVITIES: CPT

## 2024-07-11 PROCEDURE — 6360000002 HC RX W HCPCS: Performed by: INTERNAL MEDICINE

## 2024-07-11 PROCEDURE — 6370000000 HC RX 637 (ALT 250 FOR IP)

## 2024-07-11 PROCEDURE — 2580000003 HC RX 258: Performed by: INTERNAL MEDICINE

## 2024-07-11 RX ADMIN — NIMODIPINE 60 MG: 30 CAPSULE, LIQUID FILLED ORAL at 23:56

## 2024-07-11 RX ADMIN — NIMODIPINE 60 MG: 30 CAPSULE, LIQUID FILLED ORAL at 15:36

## 2024-07-11 RX ADMIN — BUTALBITAL, ACETAMINOPHEN, AND CAFFEINE 1 TABLET: 50; 325; 40 TABLET ORAL at 15:36

## 2024-07-11 RX ADMIN — BUTALBITAL, ACETAMINOPHEN, AND CAFFEINE 1 TABLET: 50; 325; 40 TABLET ORAL at 21:06

## 2024-07-11 RX ADMIN — SODIUM CHLORIDE, PRESERVATIVE FREE 10 ML: 5 INJECTION INTRAVENOUS at 10:07

## 2024-07-11 RX ADMIN — BUTALBITAL, ACETAMINOPHEN, AND CAFFEINE 1 TABLET: 50; 325; 40 TABLET ORAL at 00:12

## 2024-07-11 RX ADMIN — SODIUM CHLORIDE 1 G: 1 TABLET ORAL at 08:20

## 2024-07-11 RX ADMIN — NIMODIPINE 60 MG: 30 CAPSULE, LIQUID FILLED ORAL at 08:20

## 2024-07-11 RX ADMIN — NIMODIPINE 60 MG: 30 CAPSULE, LIQUID FILLED ORAL at 20:33

## 2024-07-11 RX ADMIN — SODIUM CHLORIDE 1 G: 1 TABLET ORAL at 12:10

## 2024-07-11 RX ADMIN — SODIUM CHLORIDE 1 G: 1 TABLET ORAL at 17:14

## 2024-07-11 RX ADMIN — ENOXAPARIN SODIUM 40 MG: 100 INJECTION SUBCUTANEOUS at 12:10

## 2024-07-11 RX ADMIN — SODIUM CHLORIDE: 9 INJECTION, SOLUTION INTRAVENOUS at 04:19

## 2024-07-11 RX ADMIN — NIMODIPINE 60 MG: 30 CAPSULE, LIQUID FILLED ORAL at 12:10

## 2024-07-11 RX ADMIN — BUTALBITAL, ACETAMINOPHEN, AND CAFFEINE 1 TABLET: 50; 325; 40 TABLET ORAL at 06:11

## 2024-07-11 RX ADMIN — GABAPENTIN 200 MG: 100 CAPSULE ORAL at 20:32

## 2024-07-11 RX ADMIN — GABAPENTIN 200 MG: 100 CAPSULE ORAL at 08:20

## 2024-07-11 RX ADMIN — NIMODIPINE 60 MG: 30 CAPSULE, LIQUID FILLED ORAL at 00:12

## 2024-07-11 RX ADMIN — NIMODIPINE 60 MG: 30 CAPSULE, LIQUID FILLED ORAL at 04:11

## 2024-07-11 RX ADMIN — VENLAFAXINE HYDROCHLORIDE 150 MG: 150 CAPSULE, EXTENDED RELEASE ORAL at 08:20

## 2024-07-11 ASSESSMENT — PAIN DESCRIPTION - ORIENTATION
ORIENTATION: LOWER

## 2024-07-11 ASSESSMENT — PAIN DESCRIPTION - LOCATION
LOCATION: HEAD
LOCATION: HEAD;BACK

## 2024-07-11 ASSESSMENT — PAIN SCALES - GENERAL
PAINLEVEL_OUTOF10: 4
PAINLEVEL_OUTOF10: 1
PAINLEVEL_OUTOF10: 1
PAINLEVEL_OUTOF10: 4
PAINLEVEL_OUTOF10: 7
PAINLEVEL_OUTOF10: 2
PAINLEVEL_OUTOF10: 3

## 2024-07-11 ASSESSMENT — PAIN DESCRIPTION - DESCRIPTORS
DESCRIPTORS: ACHING
DESCRIPTORS: ACHING
DESCRIPTORS: ACHING;CRAMPING

## 2024-07-11 ASSESSMENT — PAIN - FUNCTIONAL ASSESSMENT: PAIN_FUNCTIONAL_ASSESSMENT: ACTIVITIES ARE NOT PREVENTED

## 2024-07-12 ENCOUNTER — APPOINTMENT (OUTPATIENT)
Facility: HOSPITAL | Age: 64
DRG: 021 | End: 2024-07-12
Payer: COMMERCIAL

## 2024-07-12 LAB
ANION GAP SERPL CALC-SCNC: 9 MMOL/L (ref 5–15)
BUN SERPL-MCNC: 5 MG/DL (ref 6–20)
BUN/CREAT SERPL: 10 (ref 12–20)
CALCIUM SERPL-MCNC: 9.1 MG/DL (ref 8.5–10.1)
CHLORIDE SERPL-SCNC: 102 MMOL/L (ref 97–108)
CO2 SERPL-SCNC: 24 MMOL/L (ref 21–32)
CREAT SERPL-MCNC: 0.5 MG/DL (ref 0.55–1.02)
ECHO BSA: 1.62 M2
GLUCOSE SERPL-MCNC: 111 MG/DL (ref 65–100)
MAGNESIUM SERPL-MCNC: 2 MG/DL (ref 1.6–2.4)
POTASSIUM SERPL-SCNC: 4.1 MMOL/L (ref 3.5–5.1)
SODIUM SERPL-SCNC: 135 MMOL/L (ref 136–145)
VAS BASILAR ARTERY EDV: 23.5 CM/S
VAS BASILAR ARTERY MEAN VEL: 37 CM/S
VAS BASILAR ARTERY PSV: 61.6 CM/S
VAS LEFT ACA EDV: 32.5 CM/S
VAS LEFT ACA MEAN VEL: 44.2 CM/S
VAS LEFT ACA PSV: 67.7 CM/S
VAS LEFT EX ICA MEAN VEL: 41 CM/S
VAS LEFT ICA DIST EDV: 26.7 CM/S
VAS LEFT ICA DIST PSV: 69.5 CM/S
VAS LEFT LINDEGAARD RATIO: 0.88
VAS LEFT MCA 1 EDV: 22.2 CM/S
VAS LEFT MCA 1 MEAN VEL: 36.2 CM/S
VAS LEFT MCA 1 PSV: 64.2 CM/S
VAS LEFT PCA 1 EDV: 27.1 CM/S
VAS LEFT PCA 1 MEAN VEL: 42.8 CM/S
VAS LEFT PCA 1 PSV: 74.2 CM/S
VAS RIGHT EX ICA MEAN VEL: 39 CM/S
VAS RIGHT ICA DIST EDV: 23.4 CM/S
VAS RIGHT ICA DIST PSV: 69.5 CM/S
VAS RIGHT LINDEGAARD RATIO: 1.1
VAS RIGHT MCA 1 EDV: 26.7 CM/S
VAS RIGHT MCA 1 MEAN VEL: 43.1 CM/S
VAS RIGHT MCA 1 PSV: 75.9 CM/S
VAS RIGHT PCA 1 EDV: 25.8 CM/S
VAS RIGHT PCA 1 MEAN VEL: 39.2 CM/S
VAS RIGHT PCA 1 PSV: 66 CM/S
VAS RIGHT VERTEBRAL EDV TCD: 19.4 CM/S
VAS RIGHT VERTEBRAL MEAN VEL: 28.4 CM/S
VAS RIGHT VERTEBRAL PSV TCD: 46.4 CM/S

## 2024-07-12 PROCEDURE — 93886 INTRACRANIAL COMPLETE STUDY: CPT

## 2024-07-12 PROCEDURE — 6370000000 HC RX 637 (ALT 250 FOR IP): Performed by: NURSE PRACTITIONER

## 2024-07-12 PROCEDURE — 97116 GAIT TRAINING THERAPY: CPT

## 2024-07-12 PROCEDURE — 80048 BASIC METABOLIC PNL TOTAL CA: CPT

## 2024-07-12 PROCEDURE — 6360000002 HC RX W HCPCS: Performed by: INTERNAL MEDICINE

## 2024-07-12 PROCEDURE — 83735 ASSAY OF MAGNESIUM: CPT

## 2024-07-12 PROCEDURE — 6370000000 HC RX 637 (ALT 250 FOR IP): Performed by: INTERNAL MEDICINE

## 2024-07-12 PROCEDURE — 71045 X-RAY EXAM CHEST 1 VIEW: CPT

## 2024-07-12 PROCEDURE — 99291 CRITICAL CARE FIRST HOUR: CPT

## 2024-07-12 PROCEDURE — 6370000000 HC RX 637 (ALT 250 FOR IP)

## 2024-07-12 PROCEDURE — 97112 NEUROMUSCULAR REEDUCATION: CPT

## 2024-07-12 PROCEDURE — 36415 COLL VENOUS BLD VENIPUNCTURE: CPT

## 2024-07-12 PROCEDURE — 2580000003 HC RX 258: Performed by: INTERNAL MEDICINE

## 2024-07-12 PROCEDURE — 2060000000 HC ICU INTERMEDIATE R&B

## 2024-07-12 RX ORDER — CYCLOBENZAPRINE HCL 10 MG
5 TABLET ORAL 3 TIMES DAILY PRN
Status: DISCONTINUED | OUTPATIENT
Start: 2024-07-12 | End: 2024-07-16 | Stop reason: HOSPADM

## 2024-07-12 RX ADMIN — VENLAFAXINE HYDROCHLORIDE 150 MG: 150 CAPSULE, EXTENDED RELEASE ORAL at 09:24

## 2024-07-12 RX ADMIN — NIMODIPINE 60 MG: 30 CAPSULE, LIQUID FILLED ORAL at 09:25

## 2024-07-12 RX ADMIN — CYCLOBENZAPRINE 5 MG: 10 TABLET, FILM COATED ORAL at 04:01

## 2024-07-12 RX ADMIN — GABAPENTIN 200 MG: 100 CAPSULE ORAL at 09:25

## 2024-07-12 RX ADMIN — ENOXAPARIN SODIUM 40 MG: 100 INJECTION SUBCUTANEOUS at 12:52

## 2024-07-12 RX ADMIN — NIMODIPINE 60 MG: 30 CAPSULE, LIQUID FILLED ORAL at 03:53

## 2024-07-12 RX ADMIN — SODIUM CHLORIDE, PRESERVATIVE FREE 10 ML: 5 INJECTION INTRAVENOUS at 20:07

## 2024-07-12 RX ADMIN — BUTALBITAL, ACETAMINOPHEN, AND CAFFEINE 1 TABLET: 50; 325; 40 TABLET ORAL at 03:53

## 2024-07-12 RX ADMIN — NIMODIPINE 60 MG: 30 CAPSULE, LIQUID FILLED ORAL at 15:52

## 2024-07-12 RX ADMIN — BUTALBITAL, ACETAMINOPHEN, AND CAFFEINE 1 TABLET: 50; 325; 40 TABLET ORAL at 20:04

## 2024-07-12 RX ADMIN — POLYETHYLENE GLYCOL 3350 17 G: 17 POWDER, FOR SOLUTION ORAL at 09:25

## 2024-07-12 RX ADMIN — SODIUM CHLORIDE 1 G: 1 TABLET ORAL at 09:25

## 2024-07-12 RX ADMIN — SODIUM CHLORIDE 1 G: 1 TABLET ORAL at 15:53

## 2024-07-12 RX ADMIN — NIMODIPINE 60 MG: 30 CAPSULE, LIQUID FILLED ORAL at 12:52

## 2024-07-12 RX ADMIN — SODIUM CHLORIDE 1 G: 1 TABLET ORAL at 12:52

## 2024-07-12 RX ADMIN — NIMODIPINE 60 MG: 30 CAPSULE, LIQUID FILLED ORAL at 20:04

## 2024-07-12 RX ADMIN — CYCLOBENZAPRINE 5 MG: 10 TABLET, FILM COATED ORAL at 11:05

## 2024-07-12 RX ADMIN — GABAPENTIN 200 MG: 100 CAPSULE ORAL at 21:25

## 2024-07-12 RX ADMIN — CYCLOBENZAPRINE 5 MG: 10 TABLET, FILM COATED ORAL at 18:51

## 2024-07-12 ASSESSMENT — PAIN DESCRIPTION - LOCATION
LOCATION: BACK;HEAD
LOCATION: BACK
LOCATION: HEAD;BACK
LOCATION: HEAD

## 2024-07-12 ASSESSMENT — PAIN DESCRIPTION - FREQUENCY
FREQUENCY: INTERMITTENT
FREQUENCY: INTERMITTENT

## 2024-07-12 ASSESSMENT — PAIN SCALES - GENERAL
PAINLEVEL_OUTOF10: 3
PAINLEVEL_OUTOF10: 5
PAINLEVEL_OUTOF10: 5
PAINLEVEL_OUTOF10: 2
PAINLEVEL_OUTOF10: 4
PAINLEVEL_OUTOF10: 6

## 2024-07-12 ASSESSMENT — PAIN DESCRIPTION - ORIENTATION: ORIENTATION: LOWER

## 2024-07-12 ASSESSMENT — PAIN DESCRIPTION - DESCRIPTORS: DESCRIPTORS: ACHING;CRAMPING

## 2024-07-12 ASSESSMENT — PAIN - FUNCTIONAL ASSESSMENT: PAIN_FUNCTIONAL_ASSESSMENT: PREVENTS OR INTERFERES SOME ACTIVE ACTIVITIES AND ADLS

## 2024-07-12 NOTE — H&P
CRITICAL CARE ADMISSION NOTE      Name: Gaby Stroud   : 1960   MRN: [id number]   Date: 2024      REASON FOR ICU ADMISSION:  SAH     PRINCIPAL ICU DIAGNOSIS   SAH    BRIEF PATIENT SUMMARY   Gaby Stroud is a 65 yo female with pmhx as below who presented to [facility] ED after being sent by her PCP for HA/N/V.  Symptom onset 2 days prior to presentation.  She endorses some improvement with Excedrin.  She denies other neurologic symptoms.  No AC.  Neuroimaging demonstrated SAH.  She is transfer to [facility] ICU for further management.  Nimodipine ordered with plans for angio tomorrow am with NIS.    COMPREHENSIVE ASSESSMENT & PLAN:SYSTEM BASED     24 HOUR EVENTS: As above    NEUROLOGICAL:   SAH  Close neurologic monitoring, neuro checks  NIS consulted, appreciate recs  NSGY consulted by ED  Neuroimaging as above  Nimodipine  SBP goal < 140  Keppra  PT/OT/SLP    Depression  Home velafaxine    PULMONOLOGY:   No active issues    CARDIOVASCULAR:   Tele  MAP goal > 65  SBP goal as above    GASTROINTESTINAL   NPO at midnight for angio    RENAL/ELECTROLYTE/FLUIDS:   Hypokalemia  Hyponatremia  Daily BMP  Replete lytes prn  Monitor UOP    ENDOCRINE:   Avoid hypo/hyperglycemia    HEMATOLOGY/ONCOLOGY:   Daily CBC  Transfuse prn    INFECTIOUS DISEASE:     ANTIBIOTICS TO DATE:  N/A    CULTURES TO DATE:  Results       ** No results found for the last 336 hours. **             ICU DAILY CHECKLIST     Code Status: Full Code    DVT Prophylaxis: SCDs  T/L/D: Tubes: None  Lines: Peripheral IV  Drains: None  SUP: N/A  Diet: Diet NPO   Activity Level: PT/OT  ABCDEF Bundle/Checklist Completed: Yes  Disposition: Stay in ICU  Multidisciplinary Rounds Completed:  N/A  Goals of Care Discussion/Palliative: No  Patient/Family Updated: Yes    HOSPITAL COURSE/DAILY EVENT LOG       SUBJECTIVE   Review of Systems   Neurological:  Positive for headaches. Negative for dizziness, weakness, light-headedness and numbness.   All other systems reviewed 
is identified.   There is no major vessel occlusion.   There is no  dissection or hemodynamically significant stenosis.            Electronically signed by MARGARETTE SUN      IR ARCH UNI CAR CERV CEREBRAL    (Results Pending)        ECG/ECHO:    Encounter Date: 07/02/24   EKG 12 lead   Result Value    Ventricular Rate 94    Atrial Rate 94    P-R Interval 148    QRS Duration 74    Q-T Interval 372    QTc Calculation (Bazett) 465    P Axis 60    R Axis 53    T Axis 55    Diagnosis      Normal sinus rhythm  Biatrial enlargement  Minimal voltage criteria for LVH, may be normal variant ( Sokolow-Souza )  Abnormal ECG  When compared with ECG of 02-JUL-2024 17:39,  No significant change  Confirmed by Nicolas Edmond ([id number]) on 7/2/2024 8:10:00 PM       07/02/24    ECHO (TTE) COMPLETE (PRN CONTRAST/BUBBLE/STRAIN/3D) 07/04/2024  1:55 PM (Final)    Interpretation Summary    Left Ventricle: Normal left ventricular systolic function with a visually estimated EF of 55 - 60%. Left ventricle size is normal. Normal wall thickness. Normal wall motion. Normal diastolic function.    Right Ventricle: Normal systolic function. TAPSE is normal. TAPSE is 2.9 cm.    Tricuspid Valve: Moderately elevated RVSP, consistent with moderate pulmonary hypertension. The estimated RVSP is 47 mmHg.    Image quality is fair.    Signed by: Harlan Alvarez MD on 7/4/2024  1:55 PM        Notes reviewed from all clinical/nonclinical/nursing services involved in patient's clinical care. Care coordination discussions were held with appropriate clinical/nonclinical/ nursing providers based on care coordination needs.     Assessment:   Given the patient's current clinical presentation, there is a high level of concern for decompensation if discharged from the emergency department. Complex decision making was performed, which includes reviewing the patient's available past medical records, laboratory results, and imaging studies.    Principal Problem:

## 2024-07-12 NOTE — CARE COORDINATION
Transition of Care Plan:     RUR: 6% Low   Prior Level of Functioning: Independent  Disposition: Home with family support   Follow up appointments: PCP, Neuro   DME needed: NA  Transportation at discharge: Family   IM/IMM Medicare/ letter given: NA  Is patient a [place] and connected with VA? No  Caregiver Contact: Son Adam Stroud [phone]  Discharge Caregiver contacted prior to discharge? No  Care Conference needed? No  Barriers to discharge:    Patient is s/p coil embolization of A-comm  aneurysm   Day 9/21 of Nimotop   QD TCD's   NICOM QS  Therapy is recommending Home with family and OP therapy  Care management is continuing to follow.  Maira Ruby RN,Care Management

## 2024-07-13 ENCOUNTER — APPOINTMENT (OUTPATIENT)
Facility: HOSPITAL | Age: 64
DRG: 021 | End: 2024-07-13
Payer: COMMERCIAL

## 2024-07-13 LAB
ANION GAP SERPL CALC-SCNC: 6 MMOL/L (ref 5–15)
BASOPHILS # BLD: 0.1 K/UL (ref 0–0.1)
BASOPHILS NFR BLD: 1 % (ref 0–1)
BUN SERPL-MCNC: 9 MG/DL (ref 6–20)
BUN/CREAT SERPL: 14 (ref 12–20)
CALCIUM SERPL-MCNC: 9.1 MG/DL (ref 8.5–10.1)
CHLORIDE SERPL-SCNC: 103 MMOL/L (ref 97–108)
CO2 SERPL-SCNC: 25 MMOL/L (ref 21–32)
CREAT SERPL-MCNC: 0.64 MG/DL (ref 0.55–1.02)
DIFFERENTIAL METHOD BLD: ABNORMAL
ECHO BSA: 1.62 M2
ECHO BSA: 1.62 M2
EOSINOPHIL # BLD: 0.1 K/UL (ref 0–0.4)
EOSINOPHIL NFR BLD: 1 % (ref 0–7)
ERYTHROCYTE [DISTWIDTH] IN BLOOD BY AUTOMATED COUNT: 12.5 % (ref 11.5–14.5)
GLUCOSE SERPL-MCNC: 107 MG/DL (ref 65–100)
HCT VFR BLD AUTO: 36 % (ref 35–47)
HGB BLD-MCNC: 11.9 G/DL (ref 11.5–16)
IMM GRANULOCYTES # BLD AUTO: 0 K/UL
IMM GRANULOCYTES NFR BLD AUTO: 0 %
LYMPHOCYTES # BLD: 1.8 K/UL (ref 0.8–3.5)
LYMPHOCYTES NFR BLD: 29 % (ref 12–49)
MAGNESIUM SERPL-MCNC: 2.2 MG/DL (ref 1.6–2.4)
MCH RBC QN AUTO: 32.2 PG (ref 26–34)
MCHC RBC AUTO-ENTMCNC: 33.1 G/DL (ref 30–36.5)
MCV RBC AUTO: 97.3 FL (ref 80–99)
MONOCYTES # BLD: 0.7 K/UL (ref 0–1)
MONOCYTES NFR BLD: 12 % (ref 5–13)
NEUTS BAND NFR BLD MANUAL: 4 % (ref 0–6)
NEUTS SEG # BLD: 3.4 K/UL (ref 1.8–8)
NEUTS SEG NFR BLD: 53 % (ref 32–75)
NRBC # BLD: 0 K/UL (ref 0–0.01)
NRBC BLD-RTO: 0 PER 100 WBC
PLATELET # BLD AUTO: 437 K/UL (ref 150–400)
PMV BLD AUTO: 8.8 FL (ref 8.9–12.9)
POTASSIUM SERPL-SCNC: 3.8 MMOL/L (ref 3.5–5.1)
RBC # BLD AUTO: 3.7 M/UL (ref 3.8–5.2)
RBC MORPH BLD: ABNORMAL
SODIUM SERPL-SCNC: 134 MMOL/L (ref 136–145)
VAS BASILAR ARTERY EDV: 25.2 CM/S
VAS BASILAR ARTERY EDV: 29.8 CM/S
VAS BASILAR ARTERY MEAN VEL: 37 CM/S
VAS BASILAR ARTERY MEAN VEL: 48 CM/S
VAS BASILAR ARTERY PSV: 60.8 CM/S
VAS BASILAR ARTERY PSV: 82.7 CM/S
VAS LEFT ACA EDV: 28.9 CM/S
VAS LEFT ACA MEAN VEL: 42 CM/S
VAS LEFT ACA PSV: 68.2 CM/S
VAS LEFT EX ICA MEAN VEL: 32 CM/S
VAS LEFT EX ICA MEAN VEL: 40 CM/S
VAS LEFT ICA DIST EDV: 18.4 CM/S
VAS LEFT ICA DIST EDV: 24 CM/S
VAS LEFT ICA DIST PSV: 61.1 CM/S
VAS LEFT ICA DIST PSV: 70.6 CM/S
VAS LEFT LINDEGAARD RATIO: 0.9
VAS LEFT LINDEGAARD RATIO: 1.2
VAS LEFT MCA 1 EDV: 23.1 CM/S
VAS LEFT MCA 1 EDV: 28.5 CM/S
VAS LEFT MCA 1 MEAN VEL: 34.7 CM/S
VAS LEFT MCA 1 MEAN VEL: 37.9 CM/S
VAS LEFT MCA 1 PSV: 56.8 CM/S
VAS LEFT MCA 1 PSV: 57.8 CM/S
VAS LEFT PCA 1 EDV: 17.5 CM/S
VAS LEFT PCA 1 EDV: 30.7 CM/S
VAS LEFT PCA 1 MEAN VEL: 30 CM/S
VAS LEFT PCA 1 MEAN VEL: 44.4 CM/S
VAS LEFT PCA 1 PSV: 55 CM/S
VAS LEFT PCA 1 PSV: 71.8 CM/S
VAS LEFT VERTEBRAL EDV TCD: 12.4 CM/S
VAS LEFT VERTEBRAL MEAN VEL: 24 CM/S
VAS LEFT VERTEBRAL PSV TCD: 47.1 CM/S
VAS RIGHT ACA EDV: 17.1 CM/S
VAS RIGHT ACA MEAN VEL: 31.8 CM/S
VAS RIGHT ACA PSV: 61.2 CM/S
VAS RIGHT EX ICA MEAN VEL: 34 CM/S
VAS RIGHT ICA DIST EDV: 16.7 CM/S
VAS RIGHT ICA DIST EDV: 19.7 CM/S
VAS RIGHT ICA DIST PSV: 65 CM/S
VAS RIGHT ICA DIST PSV: 67.9 CM/S
VAS RIGHT LINDEGAARD RATIO: 1.2
VAS RIGHT LINDEGAARD RATIO: 1.3
VAS RIGHT MCA 1 EDV: 25.4 CM/S
VAS RIGHT MCA 1 EDV: 26.2 CM/S
VAS RIGHT MCA 1 MEAN VEL: 42.2 CM/S
VAS RIGHT MCA 1 MEAN VEL: 43.8 CM/S
VAS RIGHT MCA 1 PSV: 75.9 CM/S
VAS RIGHT MCA 1 PSV: 78.9 CM/S
VAS RIGHT PCA 1 EDV: 15.7 CM/S
VAS RIGHT PCA 1 EDV: 18 CM/S
VAS RIGHT PCA 1 MEAN VEL: 28 CM/S
VAS RIGHT PCA 1 MEAN VEL: 28.5 CM/S
VAS RIGHT PCA 1 PSV: 48.1 CM/S
VAS RIGHT PCA 1 PSV: 54.1 CM/S
VAS RIGHT VERTEBRAL EDV TCD: 13.2 CM/S
VAS RIGHT VERTEBRAL EDV TCD: 17.9 CM/S
VAS RIGHT VERTEBRAL MEAN VEL: 23 CM/S
VAS RIGHT VERTEBRAL MEAN VEL: 28.2 CM/S
VAS RIGHT VERTEBRAL PSV TCD: 42.5 CM/S
VAS RIGHT VERTEBRAL PSV TCD: 48.9 CM/S
WBC # BLD AUTO: 6.1 K/UL (ref 3.6–11)

## 2024-07-13 PROCEDURE — 2580000003 HC RX 258: Performed by: INTERNAL MEDICINE

## 2024-07-13 PROCEDURE — 6370000000 HC RX 637 (ALT 250 FOR IP): Performed by: NURSE PRACTITIONER

## 2024-07-13 PROCEDURE — 6370000000 HC RX 637 (ALT 250 FOR IP): Performed by: INTERNAL MEDICINE

## 2024-07-13 PROCEDURE — 6360000002 HC RX W HCPCS: Performed by: INTERNAL MEDICINE

## 2024-07-13 PROCEDURE — 85025 COMPLETE CBC W/AUTO DIFF WBC: CPT

## 2024-07-13 PROCEDURE — 83735 ASSAY OF MAGNESIUM: CPT

## 2024-07-13 PROCEDURE — 99291 CRITICAL CARE FIRST HOUR: CPT

## 2024-07-13 PROCEDURE — 2060000000 HC ICU INTERMEDIATE R&B

## 2024-07-13 PROCEDURE — 36415 COLL VENOUS BLD VENIPUNCTURE: CPT

## 2024-07-13 PROCEDURE — 6370000000 HC RX 637 (ALT 250 FOR IP)

## 2024-07-13 PROCEDURE — 93886 INTRACRANIAL COMPLETE STUDY: CPT

## 2024-07-13 PROCEDURE — 80048 BASIC METABOLIC PNL TOTAL CA: CPT

## 2024-07-13 RX ADMIN — CYCLOBENZAPRINE 5 MG: 10 TABLET, FILM COATED ORAL at 15:24

## 2024-07-13 RX ADMIN — NIMODIPINE 60 MG: 30 CAPSULE, LIQUID FILLED ORAL at 15:23

## 2024-07-13 RX ADMIN — ACETAMINOPHEN 650 MG: 325 TABLET ORAL at 21:16

## 2024-07-13 RX ADMIN — ACETAMINOPHEN 650 MG: 325 TABLET ORAL at 00:09

## 2024-07-13 RX ADMIN — SODIUM CHLORIDE 1 G: 1 TABLET ORAL at 11:49

## 2024-07-13 RX ADMIN — NIMODIPINE 60 MG: 30 CAPSULE, LIQUID FILLED ORAL at 00:06

## 2024-07-13 RX ADMIN — GABAPENTIN 200 MG: 100 CAPSULE ORAL at 07:42

## 2024-07-13 RX ADMIN — SODIUM CHLORIDE 1 G: 1 TABLET ORAL at 16:45

## 2024-07-13 RX ADMIN — SODIUM CHLORIDE 1 G: 1 TABLET ORAL at 07:42

## 2024-07-13 RX ADMIN — NIMODIPINE 60 MG: 30 CAPSULE, LIQUID FILLED ORAL at 19:57

## 2024-07-13 RX ADMIN — ACETAMINOPHEN 650 MG: 325 TABLET ORAL at 04:44

## 2024-07-13 RX ADMIN — ACETAMINOPHEN 650 MG: 325 TABLET ORAL at 09:20

## 2024-07-13 RX ADMIN — ENOXAPARIN SODIUM 40 MG: 100 INJECTION SUBCUTANEOUS at 11:49

## 2024-07-13 RX ADMIN — ACETAMINOPHEN 650 MG: 325 TABLET ORAL at 17:04

## 2024-07-13 RX ADMIN — NIMODIPINE 60 MG: 30 CAPSULE, LIQUID FILLED ORAL at 04:01

## 2024-07-13 RX ADMIN — NIMODIPINE 60 MG: 30 CAPSULE, LIQUID FILLED ORAL at 07:41

## 2024-07-13 RX ADMIN — SODIUM CHLORIDE, PRESERVATIVE FREE 10 ML: 5 INJECTION INTRAVENOUS at 21:18

## 2024-07-13 RX ADMIN — VENLAFAXINE HYDROCHLORIDE 150 MG: 150 CAPSULE, EXTENDED RELEASE ORAL at 07:42

## 2024-07-13 RX ADMIN — GABAPENTIN 200 MG: 100 CAPSULE ORAL at 21:16

## 2024-07-13 RX ADMIN — SODIUM CHLORIDE, PRESERVATIVE FREE 10 ML: 5 INJECTION INTRAVENOUS at 07:42

## 2024-07-13 RX ADMIN — NIMODIPINE 60 MG: 30 CAPSULE, LIQUID FILLED ORAL at 11:49

## 2024-07-13 ASSESSMENT — PAIN DESCRIPTION - DESCRIPTORS
DESCRIPTORS: ACHING
DESCRIPTORS: ACHING

## 2024-07-13 ASSESSMENT — PAIN DESCRIPTION - LOCATION
LOCATION: HEAD
LOCATION: HIP
LOCATION: HEAD
LOCATION: HIP
LOCATION: HEAD

## 2024-07-13 ASSESSMENT — PAIN SCALES - GENERAL
PAINLEVEL_OUTOF10: 0
PAINLEVEL_OUTOF10: 5
PAINLEVEL_OUTOF10: 0
PAINLEVEL_OUTOF10: 4
PAINLEVEL_OUTOF10: 0
PAINLEVEL_OUTOF10: 5

## 2024-07-13 ASSESSMENT — PAIN DESCRIPTION - ORIENTATION: ORIENTATION: RIGHT;LEFT;POSTERIOR

## 2024-07-14 ENCOUNTER — TELEPHONE (OUTPATIENT)
Age: 64
End: 2024-07-14

## 2024-07-14 ENCOUNTER — APPOINTMENT (OUTPATIENT)
Facility: HOSPITAL | Age: 64
DRG: 021 | End: 2024-07-14
Payer: COMMERCIAL

## 2024-07-14 LAB
ANION GAP SERPL CALC-SCNC: 5 MMOL/L (ref 5–15)
BUN SERPL-MCNC: 8 MG/DL (ref 6–20)
BUN/CREAT SERPL: 17 (ref 12–20)
CALCIUM SERPL-MCNC: 9.2 MG/DL (ref 8.5–10.1)
CHLORIDE SERPL-SCNC: 103 MMOL/L (ref 97–108)
CO2 SERPL-SCNC: 29 MMOL/L (ref 21–32)
CREAT SERPL-MCNC: 0.47 MG/DL (ref 0.55–1.02)
ECHO BSA: 1.62 M2
GLUCOSE SERPL-MCNC: 107 MG/DL (ref 65–100)
MAGNESIUM SERPL-MCNC: 2.1 MG/DL (ref 1.6–2.4)
POTASSIUM SERPL-SCNC: 3.9 MMOL/L (ref 3.5–5.1)
SODIUM SERPL-SCNC: 137 MMOL/L (ref 136–145)
VAS BASILAR ARTERY EDV: 17.9 CM/S
VAS BASILAR ARTERY MEAN VEL: 27 CM/S
VAS BASILAR ARTERY PSV: 46.2 CM/S
VAS LEFT ACA EDV: 24.9 CM/S
VAS LEFT ACA MEAN VEL: 36.2 CM/S
VAS LEFT ACA PSV: 58.7 CM/S
VAS LEFT EX ICA MEAN VEL: 37 CM/S
VAS LEFT ICA DIST EDV: 21.6 CM/S
VAS LEFT ICA DIST PSV: 66.3 CM/S
VAS LEFT ICA EDV: 16.1 CM/S
VAS LEFT ICA MEAN VEL: 25.5 CM/S
VAS LEFT ICA PSV: 44.4 CM/S
VAS LEFT LINDEGAARD RATIO: 0.7
VAS LEFT MCA 1 EDV: 17.4 CM/S
VAS LEFT MCA 1 MEAN VEL: 27.2 CM/S
VAS LEFT MCA 1 PSV: 46.9 CM/S
VAS LEFT PCA 1 EDV: 33.5 CM/S
VAS LEFT PCA 1 MEAN VEL: 48.4 CM/S
VAS LEFT PCA 1 PSV: 78.2 CM/S
VAS RIGHT EX ICA MEAN VEL: 34 CM/S
VAS RIGHT ICA DIST EDV: 17.9 CM/S
VAS RIGHT ICA DIST PSV: 64.5 CM/S
VAS RIGHT LINDEGAARD RATIO: 1.1
VAS RIGHT MCA 1 EDV: 25.2 CM/S
VAS RIGHT MCA 1 MEAN VEL: 36.2 CM/S
VAS RIGHT MCA 1 PSV: 58.1 CM/S
VAS RIGHT PCA 1 EDV: 26.1 CM/S
VAS RIGHT PCA 1 MEAN VEL: 38.3 CM/S
VAS RIGHT PCA 1 PSV: 62.6 CM/S
VAS RIGHT VERTEBRAL EDV TCD: 15.1 CM/S
VAS RIGHT VERTEBRAL MEAN VEL: 21.4 CM/S
VAS RIGHT VERTEBRAL PSV TCD: 34.1 CM/S

## 2024-07-14 PROCEDURE — 2580000003 HC RX 258: Performed by: INTERNAL MEDICINE

## 2024-07-14 PROCEDURE — 6370000000 HC RX 637 (ALT 250 FOR IP)

## 2024-07-14 PROCEDURE — 6370000000 HC RX 637 (ALT 250 FOR IP): Performed by: INTERNAL MEDICINE

## 2024-07-14 PROCEDURE — 6370000000 HC RX 637 (ALT 250 FOR IP): Performed by: NURSE PRACTITIONER

## 2024-07-14 PROCEDURE — 6360000002 HC RX W HCPCS: Performed by: INTERNAL MEDICINE

## 2024-07-14 PROCEDURE — 99231 SBSQ HOSP IP/OBS SF/LOW 25: CPT

## 2024-07-14 PROCEDURE — 80048 BASIC METABOLIC PNL TOTAL CA: CPT

## 2024-07-14 PROCEDURE — 2060000000 HC ICU INTERMEDIATE R&B

## 2024-07-14 PROCEDURE — 36415 COLL VENOUS BLD VENIPUNCTURE: CPT

## 2024-07-14 PROCEDURE — 83735 ASSAY OF MAGNESIUM: CPT

## 2024-07-14 PROCEDURE — 93886 INTRACRANIAL COMPLETE STUDY: CPT

## 2024-07-14 RX ADMIN — ACETAMINOPHEN 650 MG: 325 TABLET ORAL at 02:26

## 2024-07-14 RX ADMIN — SODIUM CHLORIDE 1 G: 1 TABLET ORAL at 12:00

## 2024-07-14 RX ADMIN — NIMODIPINE 60 MG: 30 CAPSULE, LIQUID FILLED ORAL at 11:23

## 2024-07-14 RX ADMIN — VENLAFAXINE HYDROCHLORIDE 150 MG: 150 CAPSULE, EXTENDED RELEASE ORAL at 08:25

## 2024-07-14 RX ADMIN — NIMODIPINE 60 MG: 30 CAPSULE, LIQUID FILLED ORAL at 00:02

## 2024-07-14 RX ADMIN — ENOXAPARIN SODIUM 40 MG: 100 INJECTION SUBCUTANEOUS at 13:00

## 2024-07-14 RX ADMIN — SODIUM CHLORIDE 1 G: 1 TABLET ORAL at 16:26

## 2024-07-14 RX ADMIN — POLYETHYLENE GLYCOL 3350 17 G: 17 POWDER, FOR SOLUTION ORAL at 08:25

## 2024-07-14 RX ADMIN — NIMODIPINE 60 MG: 30 CAPSULE, LIQUID FILLED ORAL at 08:25

## 2024-07-14 RX ADMIN — NIMODIPINE 60 MG: 30 CAPSULE, LIQUID FILLED ORAL at 04:14

## 2024-07-14 RX ADMIN — SODIUM CHLORIDE, PRESERVATIVE FREE 10 ML: 5 INJECTION INTRAVENOUS at 08:29

## 2024-07-14 RX ADMIN — GABAPENTIN 200 MG: 100 CAPSULE ORAL at 08:30

## 2024-07-14 RX ADMIN — BUTALBITAL, ACETAMINOPHEN, AND CAFFEINE 1 TABLET: 50; 325; 40 TABLET ORAL at 06:23

## 2024-07-14 RX ADMIN — GABAPENTIN 200 MG: 100 CAPSULE ORAL at 20:39

## 2024-07-14 RX ADMIN — BUTALBITAL, ACETAMINOPHEN, AND CAFFEINE 1 TABLET: 50; 325; 40 TABLET ORAL at 20:39

## 2024-07-14 RX ADMIN — ACETAMINOPHEN 650 MG: 325 TABLET ORAL at 08:25

## 2024-07-14 RX ADMIN — NIMODIPINE 60 MG: 30 CAPSULE, LIQUID FILLED ORAL at 20:39

## 2024-07-14 RX ADMIN — NIMODIPINE 60 MG: 30 CAPSULE, LIQUID FILLED ORAL at 16:22

## 2024-07-14 RX ADMIN — SODIUM CHLORIDE 1 G: 1 TABLET ORAL at 08:24

## 2024-07-14 ASSESSMENT — PAIN DESCRIPTION - DESCRIPTORS
DESCRIPTORS: BURNING
DESCRIPTORS: ACHING
DESCRIPTORS: ACHING

## 2024-07-14 ASSESSMENT — PAIN SCALES - GENERAL
PAINLEVEL_OUTOF10: 0
PAINLEVEL_OUTOF10: 4
PAINLEVEL_OUTOF10: 7
PAINLEVEL_OUTOF10: 7
PAINLEVEL_OUTOF10: 0

## 2024-07-14 ASSESSMENT — PAIN DESCRIPTION - LOCATION
LOCATION: HIP
LOCATION: HEAD
LOCATION: HEAD

## 2024-07-14 ASSESSMENT — PAIN DESCRIPTION - ORIENTATION
ORIENTATION: RIGHT;LEFT
ORIENTATION: LEFT

## 2024-07-15 ENCOUNTER — APPOINTMENT (OUTPATIENT)
Facility: HOSPITAL | Age: 64
DRG: 021 | End: 2024-07-15
Payer: COMMERCIAL

## 2024-07-15 LAB
ANION GAP SERPL CALC-SCNC: 7 MMOL/L (ref 5–15)
ANION GAP SERPL CALC-SCNC: 8 MMOL/L (ref 5–15)
BUN SERPL-MCNC: 8 MG/DL (ref 6–20)
BUN SERPL-MCNC: 9 MG/DL (ref 6–20)
BUN/CREAT SERPL: 15 (ref 12–20)
BUN/CREAT SERPL: 18 (ref 12–20)
CALCIUM SERPL-MCNC: 9.1 MG/DL (ref 8.5–10.1)
CALCIUM SERPL-MCNC: 9.3 MG/DL (ref 8.5–10.1)
CHLORIDE SERPL-SCNC: 101 MMOL/L (ref 97–108)
CHLORIDE SERPL-SCNC: 101 MMOL/L (ref 97–108)
CO2 SERPL-SCNC: 27 MMOL/L (ref 21–32)
CO2 SERPL-SCNC: 27 MMOL/L (ref 21–32)
CREAT SERPL-MCNC: 0.49 MG/DL (ref 0.55–1.02)
CREAT SERPL-MCNC: 0.54 MG/DL (ref 0.55–1.02)
GLUCOSE SERPL-MCNC: 102 MG/DL (ref 65–100)
GLUCOSE SERPL-MCNC: 99 MG/DL (ref 65–100)
MAGNESIUM SERPL-MCNC: 2.3 MG/DL (ref 1.6–2.4)
POTASSIUM SERPL-SCNC: 4.1 MMOL/L (ref 3.5–5.1)
POTASSIUM SERPL-SCNC: 4.6 MMOL/L (ref 3.5–5.1)
SODIUM SERPL-SCNC: 135 MMOL/L (ref 136–145)
SODIUM SERPL-SCNC: 136 MMOL/L (ref 136–145)

## 2024-07-15 PROCEDURE — 6370000000 HC RX 637 (ALT 250 FOR IP): Performed by: INTERNAL MEDICINE

## 2024-07-15 PROCEDURE — 2060000000 HC ICU INTERMEDIATE R&B

## 2024-07-15 PROCEDURE — 97116 GAIT TRAINING THERAPY: CPT

## 2024-07-15 PROCEDURE — 6370000000 HC RX 637 (ALT 250 FOR IP): Performed by: NURSE PRACTITIONER

## 2024-07-15 PROCEDURE — 36415 COLL VENOUS BLD VENIPUNCTURE: CPT

## 2024-07-15 PROCEDURE — 80048 BASIC METABOLIC PNL TOTAL CA: CPT

## 2024-07-15 PROCEDURE — 83735 ASSAY OF MAGNESIUM: CPT

## 2024-07-15 PROCEDURE — 93886 INTRACRANIAL COMPLETE STUDY: CPT

## 2024-07-15 PROCEDURE — 97535 SELF CARE MNGMENT TRAINING: CPT

## 2024-07-15 PROCEDURE — 6370000000 HC RX 637 (ALT 250 FOR IP)

## 2024-07-15 PROCEDURE — 2580000003 HC RX 258: Performed by: INTERNAL MEDICINE

## 2024-07-15 RX ORDER — NIMODIPINE 30 MG/1
60 CAPSULE, LIQUID FILLED ORAL EVERY 4 HOURS
Qty: 102 CAPSULE | Refills: 0 | Status: SHIPPED | OUTPATIENT
Start: 2024-07-15 | End: 2024-07-24

## 2024-07-15 RX ORDER — BUTALBITAL, ACETAMINOPHEN AND CAFFEINE 50; 325; 40 MG/1; MG/1; MG/1
1 TABLET ORAL EVERY 6 HOURS PRN
Qty: 20 TABLET | Refills: 0 | Status: SHIPPED | OUTPATIENT
Start: 2024-07-15

## 2024-07-15 RX ADMIN — BUTALBITAL, ACETAMINOPHEN, AND CAFFEINE 1 TABLET: 50; 325; 40 TABLET ORAL at 10:34

## 2024-07-15 RX ADMIN — SODIUM CHLORIDE 1 G: 1 TABLET ORAL at 08:05

## 2024-07-15 RX ADMIN — NIMODIPINE 60 MG: 30 CAPSULE, LIQUID FILLED ORAL at 16:03

## 2024-07-15 RX ADMIN — ACETAMINOPHEN 650 MG: 325 TABLET ORAL at 01:59

## 2024-07-15 RX ADMIN — NIMODIPINE 60 MG: 30 CAPSULE, LIQUID FILLED ORAL at 00:26

## 2024-07-15 RX ADMIN — SODIUM CHLORIDE, PRESERVATIVE FREE 10 ML: 5 INJECTION INTRAVENOUS at 08:10

## 2024-07-15 RX ADMIN — SODIUM CHLORIDE 1 G: 1 TABLET ORAL at 12:02

## 2024-07-15 RX ADMIN — NIMODIPINE 60 MG: 30 CAPSULE, LIQUID FILLED ORAL at 08:05

## 2024-07-15 RX ADMIN — ACETAMINOPHEN 650 MG: 325 TABLET ORAL at 20:49

## 2024-07-15 RX ADMIN — NIMODIPINE 60 MG: 30 CAPSULE, LIQUID FILLED ORAL at 20:50

## 2024-07-15 RX ADMIN — CYCLOBENZAPRINE 5 MG: 10 TABLET, FILM COATED ORAL at 05:57

## 2024-07-15 RX ADMIN — GABAPENTIN 200 MG: 100 CAPSULE ORAL at 08:05

## 2024-07-15 RX ADMIN — NIMODIPINE 60 MG: 30 CAPSULE, LIQUID FILLED ORAL at 04:45

## 2024-07-15 RX ADMIN — VENLAFAXINE HYDROCHLORIDE 150 MG: 150 CAPSULE, EXTENDED RELEASE ORAL at 08:05

## 2024-07-15 RX ADMIN — ACETAMINOPHEN 650 MG: 325 TABLET ORAL at 06:44

## 2024-07-15 RX ADMIN — ACETAMINOPHEN 650 MG: 325 TABLET ORAL at 12:02

## 2024-07-15 RX ADMIN — ACETAMINOPHEN 650 MG: 325 TABLET ORAL at 16:03

## 2024-07-15 RX ADMIN — NIMODIPINE 60 MG: 30 CAPSULE, LIQUID FILLED ORAL at 12:01

## 2024-07-15 ASSESSMENT — PAIN SCALES - GENERAL
PAINLEVEL_OUTOF10: 2
PAINLEVEL_OUTOF10: 2

## 2024-07-15 NOTE — CARE COORDINATION
Transition of Care Plan:    Home with family assist  - OP PT/OT - [facility] - July 22nd    Transport: Family     RUR: 6% Low   Prior Level of Functioning: Independent  Disposition: Home with family support; OP therapy  Follow up appointments: PCP, Neuro   DME needed: None  Transportation at discharge: Family   IM/IMM Medicare/ letter given: NA  Is patient a  and connected with VA? No  Caregiver Contact: Son Adam Stroud [phone]  Discharge Caregiver contacted prior to discharge? No  Care Conference needed? No  Barriers to discharge:  None    CM met with Pt and her sister at bedside to discuss discharge plan. Pt stated OP pharmacy called her to request $400 co-pay for Nimotop.Pt not able to pay, CM requested financial assist from CM , hospital will cover $413.42 co-pay for Pt.    Pt prefers [facility] for OP therapy follow up. PT/OT scheduled at [facility] - July 22nd. Follow up details placed on AVS. Order faxed to [facility] [phone].    GARTH Kyle (Ally).

## 2024-07-15 NOTE — DISCHARGE INSTRUCTIONS
medications without consulting your doctor.       NOTIFY YOUR PHYSICIAN FOR ANY OF THE FOLLOWING:   Fever over 101 degrees for 24 hours.   Chest pain, shortness of breath, fever, chills, nausea, vomiting, diarrhea, change in mentation, falling, weakness, bleeding. Severe pain or pain not relieved by medications.  Or, any other signs or symptoms that you may have questions about.      DISPOSITION: Home with home health services        Signed:   Starla Vicente MD  7/16/2024  10:55 AM

## 2024-07-16 VITALS
RESPIRATION RATE: 14 BRPM | SYSTOLIC BLOOD PRESSURE: 124 MMHG | WEIGHT: 133.16 LBS | TEMPERATURE: 98.2 F | HEART RATE: 82 BPM | BODY MASS INDEX: 24.5 KG/M2 | HEIGHT: 62 IN | DIASTOLIC BLOOD PRESSURE: 62 MMHG | OXYGEN SATURATION: 99 %

## 2024-07-16 LAB
ANION GAP SERPL CALC-SCNC: 7 MMOL/L (ref 5–15)
BUN SERPL-MCNC: 16 MG/DL (ref 6–20)
BUN/CREAT SERPL: 28 (ref 12–20)
CALCIUM SERPL-MCNC: 9.2 MG/DL (ref 8.5–10.1)
CHLORIDE SERPL-SCNC: 103 MMOL/L (ref 97–108)
CO2 SERPL-SCNC: 25 MMOL/L (ref 21–32)
COMMENT:: NORMAL
CREAT SERPL-MCNC: 0.57 MG/DL (ref 0.55–1.02)
ECHO BSA: 1.62 M2
GLUCOSE SERPL-MCNC: 110 MG/DL (ref 65–100)
MAGNESIUM SERPL-MCNC: 2.2 MG/DL (ref 1.6–2.4)
POTASSIUM SERPL-SCNC: 3.9 MMOL/L (ref 3.5–5.1)
SODIUM SERPL-SCNC: 135 MMOL/L (ref 136–145)
SPECIMEN HOLD: NORMAL
VAS BASILAR ARTERY EDV: 29.8 CM/S
VAS BASILAR ARTERY MEAN VEL: 46 CM/S
VAS BASILAR ARTERY PSV: 77.3 CM/S
VAS LEFT ACA EDV: 23.1 CM/S
VAS LEFT ACA MEAN VEL: 34.7 CM/S
VAS LEFT ACA PSV: 57.8 CM/S
VAS LEFT EX ICA MEAN VEL: 37 CM/S
VAS LEFT ICA DIST EDV: 22.5 CM/S
VAS LEFT ICA DIST PSV: 64.5 CM/S
VAS LEFT ICA EDV: 21.3 CM/S
VAS LEFT ICA MEAN VEL: 30.4 CM/S
VAS LEFT ICA PSV: 48.7 CM/S
VAS LEFT LINDEGAARD RATIO: 0.9
VAS LEFT MCA 1 EDV: 23.1 CM/S
VAS LEFT MCA 1 MEAN VEL: 33.8 CM/S
VAS LEFT MCA 1 PSV: 55.1 CM/S
VAS LEFT PCA 1 EDV: 30.3 CM/S
VAS LEFT PCA 1 MEAN VEL: 44.6 CM/S
VAS LEFT PCA 1 PSV: 73.2 CM/S
VAS RIGHT ACA EDV: 44.6 CM/S
VAS RIGHT ACA MEAN VEL: 59.2 CM/S
VAS RIGHT ACA PSV: 88.5 CM/S
VAS RIGHT EX ICA MEAN VEL: 34 CM/S
VAS RIGHT ICA DIST EDV: 19.7 CM/S
VAS RIGHT ICA DIST PSV: 60.8 CM/S
VAS RIGHT LINDEGAARD RATIO: 1.1
VAS RIGHT MCA 1 EDV: 23.4 CM/S
VAS RIGHT MCA 1 MEAN VEL: 35.6 CM/S
VAS RIGHT MCA 1 PSV: 59.9 CM/S
VAS RIGHT PCA 1 EDV: 28.1 CM/S
VAS RIGHT PCA 1 MEAN VEL: 40.5 CM/S
VAS RIGHT PCA 1 PSV: 65.4 CM/S
VAS RIGHT VERTEBRAL EDV TCD: 17 CM/S
VAS RIGHT VERTEBRAL MEAN VEL: 25.5 CM/S
VAS RIGHT VERTEBRAL PSV TCD: 42.6 CM/S

## 2024-07-16 PROCEDURE — 83735 ASSAY OF MAGNESIUM: CPT

## 2024-07-16 PROCEDURE — 6370000000 HC RX 637 (ALT 250 FOR IP): Performed by: NURSE PRACTITIONER

## 2024-07-16 PROCEDURE — 80048 BASIC METABOLIC PNL TOTAL CA: CPT

## 2024-07-16 PROCEDURE — 36415 COLL VENOUS BLD VENIPUNCTURE: CPT

## 2024-07-16 PROCEDURE — 99232 SBSQ HOSP IP/OBS MODERATE 35: CPT

## 2024-07-16 PROCEDURE — 97116 GAIT TRAINING THERAPY: CPT

## 2024-07-16 PROCEDURE — 6370000000 HC RX 637 (ALT 250 FOR IP)

## 2024-07-16 PROCEDURE — 6370000000 HC RX 637 (ALT 250 FOR IP): Performed by: INTERNAL MEDICINE

## 2024-07-16 PROCEDURE — 2580000003 HC RX 258: Performed by: INTERNAL MEDICINE

## 2024-07-16 RX ORDER — CYCLOBENZAPRINE HCL 5 MG
5 TABLET ORAL 3 TIMES DAILY PRN
Qty: 20 TABLET | Refills: 0 | Status: SHIPPED | OUTPATIENT
Start: 2024-07-16 | End: 2024-07-26

## 2024-07-16 RX ADMIN — ACETAMINOPHEN 650 MG: 325 TABLET ORAL at 12:05

## 2024-07-16 RX ADMIN — NIMODIPINE 60 MG: 30 CAPSULE, LIQUID FILLED ORAL at 12:05

## 2024-07-16 RX ADMIN — VENLAFAXINE HYDROCHLORIDE 150 MG: 150 CAPSULE, EXTENDED RELEASE ORAL at 08:13

## 2024-07-16 RX ADMIN — ACETAMINOPHEN 650 MG: 325 TABLET ORAL at 08:13

## 2024-07-16 RX ADMIN — SODIUM CHLORIDE, PRESERVATIVE FREE 10 ML: 5 INJECTION INTRAVENOUS at 08:14

## 2024-07-16 RX ADMIN — BUTALBITAL, ACETAMINOPHEN, AND CAFFEINE 1 TABLET: 50; 325; 40 TABLET ORAL at 00:10

## 2024-07-16 RX ADMIN — NIMODIPINE 60 MG: 30 CAPSULE, LIQUID FILLED ORAL at 00:10

## 2024-07-16 RX ADMIN — ACETAMINOPHEN 650 MG: 325 TABLET ORAL at 04:30

## 2024-07-16 RX ADMIN — CYCLOBENZAPRINE 5 MG: 10 TABLET, FILM COATED ORAL at 04:34

## 2024-07-16 RX ADMIN — NIMODIPINE 60 MG: 30 CAPSULE, LIQUID FILLED ORAL at 08:13

## 2024-07-16 RX ADMIN — NIMODIPINE 60 MG: 30 CAPSULE, LIQUID FILLED ORAL at 16:08

## 2024-07-16 RX ADMIN — NIMODIPINE 60 MG: 30 CAPSULE, LIQUID FILLED ORAL at 04:30

## 2024-07-16 NOTE — DISCHARGE SUMMARY
Discharge Summary       PATIENT ID: Gaby Stroud  MRN: [id number]   YOB: 1960    DATE OF ADMISSION: 7/2/2024  5:00 PM    DATE OF DISCHARGE: 07/16/24     PRIMARY CARE PROVIDER: Vignesh Michelle MD     ATTENDING PHYSICIAN: Starla Vicente MD    DISCHARGING PROVIDER: Starla Vicente MD    To contact this individual call [phone] and ask the  to page.  If unavailable ask to be transferred the Adult Hospitalist Department.    CONSULTATIONS: IP CONSULT TO TELE-NEUROLOGY  IP CONSULT TO NEUROSURGERY  IP CONSULT TO NEUROINTERVENTIONAL SURGERY  IP CONSULT TO NEUROSURGERY  IP CONSULT TO NEUROSURGERY    PROCEDURES/SURGERIES: * No surgery found *     ADMITTING DIAGNOSES & HOSPITAL COURSE:   Admission Summary:   Gaby Stroud is a 64 y.o. female with a past medical history of depression who presented to the ICU on July 2, after being sent by her PCP for intractable headache, nausea and vomiting.  She was found to have subarachnoid hemorrhage with subsequent angio showing bleeding anterior communicating aneurysm.  She underwent coiling, and embolization by neurointerventional surgery on July 3, and has been monitored in the ICU since this time.  Her neurologic exam, and head imaging has been stable, and request is being made for downgrade to neurology stepdown for every 2 hours neurochecks.         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  SBP on target.  Serial TCD's negative for vasospasm.  Patient discharged on top to complete a total of 21 days, medication delivered to her from our outpatient pharmacy.  She will follow-up with NIS  Mild hyponatremia , salt tablet discontinued yesterday.  Patient advised to follow-up with PCP with blood work within a week of discharge  Depression, stable, continue home medicine  Neuropathy, patient denied this, gabapentin discontinued day prior to

## 2024-07-16 NOTE — PROGRESS NOTES
Hospitalist Progress Note  Jered Cosme MD  Answering service: [phone] OR [phone] from in house phone        Date of Service:  2024  NAME:  Gaby Stroud  :  1960  MRN:  [id number]      Admission Summary:   Gaby Stroud is a 64 y.o. female with a past medical history of depression who presented to the ICU on , after being sent by her PCP for intractable headache, nausea and vomiting.  She was found to have subarachnoid hemorrhage with subsequent angio showing bleeding anterior communicating aneurysm.  She underwent coiling, and embolization by neurointerventional surgery on July 3, and has been monitored in the ICU since this time.  Her neurologic exam, and head imaging has been stable, and request is being made for downgrade to neurology stepdown for every 2 hours neurochecks.     Interval history / Subjective:   Seen and examined for follow up SAH s/p coil and embolization of aneurysm. Reports some BLE pain and some low back pain. HA is better. Has worked with PT/OT. BP looking good.      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, and embolization  - Every 2 hours neurochecks  - Continue nimodipine day 10 of 21  -Goal SBP less than 180  - working with PT/OT  - continue to monitor  - doing well     #Depression  - Continue home Effexor     #Constipation  - Continue bowel regimen     #Neuropathy  - Continue home Neurontin     #Hyponatremia-resolved  - Continue IV fluids, and salt tablets     Code status: full  Prophylaxis: lovenox  Care Plan discussed with: patient, nurse   Anticipated Disposition: home with outpatient PT/OT in 48 or more hours     Principal Problem:    Subarachnoid hemorrhage due to ruptured aneurysm (HCC)  Active Problems:    SAH (subarachnoid hemorrhage) (HCC)  Resolved Problems:    * No resolved hospital 
                                                                                                Hospitalist Progress Note  Starla Vicente MD  Answering service: [phone] OR [phone] from in house phone        Date of Service:  2024  NAME:  Gaby Stroud  :  1960  MRN:  [id number]      Admission Summary:   Gaby Stroud is a 64 y.o. female with a past medical history of depression who presented to the ICU on , after being sent by her PCP for intractable headache, nausea and vomiting.  She was found to have subarachnoid hemorrhage with subsequent angio showing bleeding anterior communicating aneurysm.  She underwent coiling, and embolization by neurointerventional surgery on July 3, and has been monitored in the ICU since this time.  Her neurologic exam, and head imaging has been stable, and request is being made for downgrade to neurology stepdown for every 2 hours neurochecks.     Interval history / Subjective:   Patient lying in bed, her sister present in the room.  Her sister expressed concern about her home situation, patient has  yet to work with PT to clear steps.     Assessment & Plan:     #Fever.  Single episode of low-grade fever on  around 8 PM.  No recurrence.  CXR negative.  Requested UA on  was not sent.  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  --TCD's negative, NIS okay for discharge once nimotop is  delivered to patient.     #Depression  - Continue home Effexor     #Constipation  - Continue bowel regimen     #Neuropathy  - Continue home Neurontin     #Hyponatremia-resolved  - Continue IV fluids, and salt tablets     Code status: full  Prophylaxis: lovenox  Care Plan discussed with: patient, nurse   Anticipated Disposition: Home with home health PT and OT, PT yet for quick patient to climb 
                                                                                                Hospitalist Progress Note  Starla Vicente MD  Answering service: [phone] OR [phone] from in house phone        Date of Service:  2024  NAME:  Gaby Stroud  :  1960  MRN:  [id number]      Admission Summary:   Gaby Stroud is a 64 y.o. female with a past medical history of depression who presented to the ICU on , after being sent by her PCP for intractable headache, nausea and vomiting.  She was found to have subarachnoid hemorrhage with subsequent angio showing bleeding anterior communicating aneurysm.  She underwent coiling, and embolization by neurointerventional surgery on July 3, and has been monitored in the ICU since this time.  Her neurologic exam, and head imaging has been stable, and request is being made for downgrade to neurology stepdown for every 2 hours neurochecks.     Interval history / Subjective:   Patient seen and examined in the ICU.  She is awaiting bed to go to the neuro unit.  She has episode of low-grade fever last night.  She denies cough, shortness of breath, nausea, vomiting, abdominal pain, diarrhea, dysuria, urgency or frequency.     Assessment & Plan:     #Fever.  Single episode of low-grade fever on  around 8 PM.  No recurrence.  CXR negative.  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     #Depression  - Continue home Effexor     #Constipation  - Continue bowel regimen     #Neuropathy  - Continue home Neurontin     #Hyponatremia-resolved  - Continue IV fluids, and salt tablets     Code status: full  Prophylaxis: lovenox  Care Plan discussed with: patient, nurse   Anticipated Disposition: Home with home health PT and OT in a day or so.     Principal Problem:    Subarachnoid hemorrhage due to 
                       [facility]     ICU TEAM Progress Note           Name: Gaby Stroud   : 1960   MRN: [id number]   Date: 2024       PRINCIPAL ICU DIAGNOSIS   SAH     BRIEF PATIENT SUMMARY   Gaby Stroud is a 65 yo female with pmhx as below who presented to [facility] ED after being sent by her PCP for HA/N/V.  Symptom onset 2 days prior to presentation.  She endorses some improvement with Excedrin.  She denies other neurologic symptoms.  No AC.  Neuroimaging demonstrated SAH.  She is transfer to [facility] ICU for further management.  Status post angio    -no acute events overnight.     COMPREHENSIVE ASSESSMENT & PLAN:SYSTEM BASED      24 HOUR EVENTS: As above     NEUROLOGICAL:   SAH-status post coil embolization of anterior communicating artery complex aneurysm   -- Neurosurgery, and-consult appreciate recommendations  -- Continue nimodipine  -- Follow blood pressure goal less than 180  -- Continue Keppra  -- PT/OT/SLP  -- Neuroimaging per neurosurgery-repeat head CT in the a.m.  -- TCD plan as per neuro IR  -- Continue normal saline at  goal net 0.   -- Continue Fioricet/Tylenol for headaches  Follow-up echocardiogram.    Depression  Home velafaxine     PULMONOLOGY:   No active issues     CARDIOVASCULAR:     Tele  MAP goal > 65  SBP goal as above     GASTROINTESTINAL     NPO at midnight for angio     RENAL/ELECTROLYTE/FLUIDS:     Monitor ins and outs  Electrolyte replacements     ENDOCRINE:   Avoid hypo/hyperglycemia     HEMATOLOGY/ONCOLOGY:     Daily CBC  Transfuse prn     INFECTIOUS DISEASE:      Not indicated    ICU DAILY CHECKLIST      Code Status: Full Code    DVT Prophylaxis: SCDs  T/L/D: Tubes: None  Lines: Peripheral IV  Drains: None  SUP: N/A  Diet: Diet NPO   Activity Level: PT/OT  ABCDEF Bundle/Checklist Completed: Yes  Disposition: Stay in ICU  Multidisciplinary Rounds Completed:  N/A  Goals of Care Discussion/Palliative: No  Patient/Family Updated: Yes     POD:  * No surgery found 
                       [facility]     ICU TEAM Progress Note           Name: Gaby Stroud   : 1960   MRN: [id number]   Date: 2024       PRINCIPAL ICU DIAGNOSIS   SAH     BRIEF PATIENT SUMMARY   Gaby Stroud is a 65 yo female with pmhx as below who presented to [facility] ED after being sent by her PCP for HA/N/V.  Symptom onset 2 days prior to presentation.  She endorses some improvement with Excedrin.  She denies other neurologic symptoms.  No AC.  Neuroimaging demonstrated SAH.  She is transfer to [facility] ICU for further management.  Nimodipine ordered with plans for angio tomorrow am with NIS.     COMPREHENSIVE ASSESSMENT & PLAN:SYSTEM BASED      24 HOUR EVENTS: As above     NEUROLOGICAL:   SAH-status post coil embolization of anterior communicating artery complex aneurysm   -- Neurosurgery, and-consult appreciate recommendations  -- Continue nimodipine  -- Follow blood pressure goal less than 160  -- Continue Keppra  -- PT/OT/SLP  -- Neuroimaging per neurosurgery-repeat head CT in the a.m.  --  TCD plan as per neuro IR  -- Continue normal saline at 75 cc/h  -- Continue Fioricet/Tylenol for headaches  Follow-up echocardiogram.    Depression  Home velafaxine     PULMONOLOGY:   No active issues     CARDIOVASCULAR:   Tele  MAP goal > 65  SBP goal as above     GASTROINTESTINAL   NPO at midnight for angio     RENAL/ELECTROLYTE/FLUIDS:     Monitor ins and outs  Electrolyte replacements     ENDOCRINE:   Avoid hypo/hyperglycemia     HEMATOLOGY/ONCOLOGY:   Daily CBC  Transfuse prn     INFECTIOUS DISEASE:      Not indicated  ICU DAILY CHECKLIST      Code Status: Full Code    DVT Prophylaxis: SCDs  T/L/D: Tubes: None  Lines: Peripheral IV  Drains: None  SUP: N/A  Diet: Diet NPO   Activity Level: PT/OT  ABCDEF Bundle/Checklist Completed: Yes  Disposition: Stay in ICU  Multidisciplinary Rounds Completed:  N/A  Goals of Care Discussion/Palliative: No  Patient/Family Updated: Yes     POD:  * No surgery found *    S/P: 
                       [facility]     ICU TEAM Progress Note           Name: Gaby Stroud   : 1960   MRN: [id number]   Date: 7/3/2024       PRINCIPAL ICU DIAGNOSIS   SAH     BRIEF PATIENT SUMMARY   Gaby Stroud is a 65 yo female with pmhx as below who presented to [facility] ED after being sent by her PCP for HA/N/V.  Symptom onset 2 days prior to presentation.  She endorses some improvement with Excedrin.  She denies other neurologic symptoms.  No AC.  Neuroimaging demonstrated SAH.  She is transfer to [facility] ICU for further management.  Nimodipine ordered with plans for angio tomorrow am with NIS.     COMPREHENSIVE ASSESSMENT & PLAN:SYSTEM BASED      24 HOUR EVENTS: As above     NEUROLOGICAL:   SAH-status post coil embolization of anterior communicating artery complex aneurysm   -- Neurosurgery, and-consult appreciate recommendations  --Continue nimodipine  -- Follow blood pressure goal less than 140  -- Continue Keppra  --PT/OT/SLP  --Neuroimaging per neurosurgery-repeat head CT in the a.m.  -TCD plan as per neuro IR  -- Continue normal saline at 75 cc/h  -Continue Fioricet/Tylenol for headaches  Follow-up echocardiogram.    Depression  Home velafaxine     PULMONOLOGY:   No active issues     CARDIOVASCULAR:   Tele  MAP goal > 65  SBP goal as above     GASTROINTESTINAL   NPO at midnight for angio     RENAL/ELECTROLYTE/FLUIDS:     Monitor ins and outs  Electrolyte replacements     ENDOCRINE:   Avoid hypo/hyperglycemia     HEMATOLOGY/ONCOLOGY:   Daily CBC  Transfuse prn     INFECTIOUS DISEASE:      Not indicated  ICU DAILY CHECKLIST      Code Status: Full Code    DVT Prophylaxis: SCDs  T/L/D: Tubes: None  Lines: Peripheral IV  Drains: None  SUP: N/A  Diet: Diet NPO   Activity Level: PT/OT  ABCDEF Bundle/Checklist Completed: Yes  Disposition: Stay in ICU  Multidisciplinary Rounds Completed:  N/A  Goals of Care Discussion/Palliative: No  Patient/Family Updated: Yes     POD:  * No surgery found *    S/P: 
                       [facility]     ICU TEAM Progress Note           Name: Gaby Stroud   : 1960   MRN: [id number]   Date: 2024       PRINCIPAL ICU DIAGNOSIS   SAH     BRIEF PATIENT SUMMARY   Gaby Stroud is a 63 yo female with pmhx as below who presented to [facility] ED after being sent by her PCP for HA/N/V.  Symptom onset 2 days prior to presentation.  She endorses some improvement with Excedrin.  She denies other neurologic symptoms.  No AC.  Neuroimaging demonstrated SAH.  She is transfer to [facility] ICU for further management.  Status post angio    -no acute events overnight.   -no acute events overnight, waiting for neurosurgery input regarding placement EDV today 2024     COMPREHENSIVE ASSESSMENT & PLAN:SYSTEM BASED      24 HOUR EVENTS: As above     NEUROLOGICAL:  SAH-status post coil embolization of anterior communicating artery complex aneurysm, history of depression   Alert and oriented x 4  No focal deficits  No weakness  CT scan reviewed  Continue Keppra  Continue nimotop  Continue daily TCD's  Continue Effexor  NICOM q shift  Possible EVD placement today 2024  PT/OT/speech     PULMONOLOGY:   Continue monitor oxygenation status  Supplemental oxygen maintain sats greater 96%  Out of bed bed physical therapy     CARDIOVASCULAR:   Continue monitor hemodynamic status  No vasopressor support  Maintain mean arterial blood pressure greater than 65  Maintain systolic blood pressure less than 180    GASTROINTESTINAL   N.p.o.       RENAL/ELECTROLYTE/FLUIDS:   Continue to monitor urine output  BUN 4  Creatinine 0.44  Replace electrolytes as indicated     ENDOCRINE:   Continue monitor blood glucose levels  Maintain blood glucose of between 80 and 180  Glucose 108     HEMATOLOGY/ONCOLOGY:   Hemoglobin 9.4  Platelets 290    INFECTIOUS DISEASE:   WBC 8.7  No cultures  On antibiotics    ICU DAILY CHECKLIST   Code Status: Full Code    DVT Prophylaxis: SCDs  T/L/D: Tubes: None  Lines: 
        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  Negative for vasospasm so far     Acute hydrocephalus:  stable on imaging and clinical exam   -Related to SAH  -NSY following for ?  EVD  not indicated with intact neuro status   -appropriate MS currently precludes need for EVD     Constipation:  -cont miralax 1-2 times daily      Discussed with Dr Clifford and PHILIPP Duran. Discussed with case management team.     HPI:  stable.  No events.  HA well controlled.        ICU DAILY CHECKLIST     Code Status:full   DVT Prophylaxis:lovenox  T/L/D: PIVs  SUP: not indicated  Diet: regular  Activity Level:mobilize daily   ABCDEF Bundle/Checklist Completed:Yes  Disposition: cont ICU care  Multidisciplinary Rounds Completed: yes  Goals of Care Discussion/Palliative: yes  Patient/Family Updated: yes      OBJECTIVE  Vitals:    07/09/24 1100 07/09/24 1200 07/09/24 1300 07/09/24 1400   BP: (!) 156/86 (!) 156/91 (!) 159/88 (!) 154/75   Pulse: 88 89 77 99   Resp: 18 14 10 12   Temp:  98.4 °F (36.9 °C)     TempSrc:  Oral     SpO2:  98%     Weight:       Height:         EXAM:   GEN: awake alert and oriented   HEENT  -Head: NC/AT;  -Eyes: PERRL, EOMI. No discharge or redness;  -Ears: External ears are normal. Normal TMs.  -Nose: Normal nares.  -Mouth and throat: MMM. Normal gums, mucosa, palate,. Good dentition.  NECK: Supple, with no masses. No JVD   CV: RRR, no m/r/g.  LUNGS: CTAB, no w/r/c.  ABD: Soft, NT/ND, no masses, NTTP  SKIN: Warm, well perfused. No skin rashes or abnormal lesions.  EXT: No clubbing, cyanosis, or edema.  NEURO: Normal muscle strength and tone. No focal 
        [facility] ICU Progress Note        Gaby Stroud  1960  [id number]  7/10/2024      Assessment and plan:  Hemorrhagic CVA:  SAH in bilat sylvian fissure due to ruptured acomm aneurysm  -s/p coil embo of Acomm aneurysm 7/3/24 by Dr Stokes   -sodium 136 --> 134.  Add salt tabs    -NICOM daily   -cont  ml/hr  -SBP < 180 is goal   -nimotop for 21 days   -keppra for SZR ppx 500 BID   -Cont daily TCDs.  Negative for vasospasm so far     Acute hydrocephalus:  stable on imaging and clinical exam   -Related to SAH  -stable      Constipation:  -cont miralax 1-2 times daily    +  BM yesterday     Discussed with NIS team.  Discussed with case management team.        HPI:  HA overnight.  Better this am.        ICU DAILY CHECKLIST     Code Status:full   DVT Prophylaxis: lovenox  T/L/D: PIVs  SUP: not indicated  Diet: regular  Activity Level: as tolerated. Mobilize daily   ABCDEF Bundle/Checklist Completed:Yes  Disposition: cont ICU care  Multidisciplinary Rounds Completed: yes  Goals of Care Discussion/Palliative: yes  Patient/Family Updated: yes      OBJECTIVE  Vitals:    07/10/24 1100 07/10/24 1118 07/10/24 1200 07/10/24 1300   BP: (!) 117/92  (!) 150/85 132/71   Pulse: 87  83 97   Resp: 14  11 16   Temp:   98.9 °F (37.2 °C)    TempSrc:   Oral    SpO2: 96%  97% 96%   Weight:       Height:  1.575 m (5' 2.01\")       EXAM:   GEN: awake alert and oriented   HEENT  -Head: NC/AT;  -Eyes: PERRL, EOMI. No discharge or redness;  -Ears: External ears are normal. Normal TMs.  -Nose: Normal nares.  -Mouth and throat: MMM. Normal gums, mucosa, palate,. Good dentition.  NECK: Supple, with no masses. No JVD   CV: RRR, no m/r/g.  LUNGS: CTAB, no w/r/c.  ABD: Soft, NT/ND, no masses, NTTP  SKIN: Warm, well perfused. No skin rashes or abnormal lesions.  EXT: No clubbing, cyanosis, or edema.  NEURO: Normal muscle strength and tone. No focal deficits.cranial nerves intact    LABS:   Na 134  K 3.9  Cr 0.53    WBC 7.0  Hb 
        [facility] ICU Progress Note        Gaby Stroud  1960  [id number]  7/7/2024      Assessment and plan:  Hemorrhagic CVA:  SAH in bilat sylvian fissure due to ruptured acomm aneurysm  -s/p coil embo of Acomm aneurysm 7/3/24 by Dr Stokes   -sodium dropped to 135  -NICOM is volume responsive, give 500 ml bolus  -cont  ml/hr  -SBP < 180 is goal   -nimotop for 21 days   -keppra for SZR ppx 500 BID   -Cont daily TCDs.  Negative for vasospasm so far    Acute hydrocephalus:  -Related to SAH  -NSY following for ?  EVD    -appropriate MS currently precludes need for EVD    Discussed with Dr Clifford and NP Roger. Discussed with case management team.     HPI:  remains stable.  Sodium dropped. NICOM is volume responsive       ICU DAILY CHECKLIST     Code Status:full   DVT Prophylaxis: lovenox  T/L/D: PIVs  SUP: not indicated  Diet: regular  Activity Level:as tolerated  ABCDEF Bundle/Checklist Completed:Yes  Disposition: cont ICU care  Multidisciplinary Rounds Completed: yes  Goals of Care Discussion/Palliative: yes  Patient/Family Updated: yes      OBJECTIVE  Vitals:    07/07/24 1200 07/07/24 1400 07/07/24 1500 07/07/24 1600   BP: (!) 150/72 (!) 150/78 (!) 148/89 (!) 158/82   Pulse: 85 86 89 88   Resp: 15 14 10 13   Temp: 99 °F (37.2 °C)   99.3 °F (37.4 °C)   TempSrc: Oral   Oral   SpO2:       Weight:       Height:         EXAM:   GEN: awake alert and oriented   HEENT  -Head: NC/AT;  -Eyes: PERRL, EOMI. No discharge or redness;  -Ears: External ears are normal. Normal TMs.  -Nose: Normal nares.  -Mouth and throat: MMM. Normal gums, mucosa, palate,. Good dentition.  NECK: Supple, with no masses. No JVD   CV: RRR, no m/r/g.  LUNGS: CTAB, no w/r/c.  ABD: Soft, NT/ND, no masses, NTTP  SKIN: Warm, well perfused. No skin rashes or abnormal lesions.  EXT: No clubbing, cyanosis, or edema.  NEURO: no deficits noted     LABS:     K 3.9  Cl 106  Cr 0.47    Hb 12  Plt 293    Imaging reviewed   HCT shows stable 
        [facility] ICU Progress Note        Gaby Stroud  1960  [id number]  7/8/2024      Assessment and plan:  Hemorrhagic CVA:  SAH in bilat sylvian fissure due to ruptured acomm aneurysm  -s/p coil embo of Acomm aneurysm 7/3/24 by Dr Stokes   -sodium 136  -NICOM daily   -cont  ml/hr  -SBP < 180 is goal   -nimotop for 21 days   -keppra for SZR ppx 500 BID   -Cont daily TCDs.  Negative for vasospasm so far     Acute hydrocephalus:  -Related to SAH  -NSY following for ?  EVD  not indicated with intact neuro status   -appropriate MS currently precludes need for EVD     Discussed with Dr Clifford and PHILIPP Duran. Discussed with case management team.        HPI:  Remains neuro intact.  HA mild.        ICU DAILY CHECKLIST     Code Status:full   DVT Prophylaxis:SCDs  T/L/D: PIVs  SUP: not indicated   Diet: NPO.  Plan for DHT    Activity Level: as tolerated  PTOT   ABCDEF Bundle/Checklist Completed:Yes  Disposition: cont icu care  Multidisciplinary Rounds Completed: yes  Goals of Care Discussion/Palliative: yes  Patient/Family Updated: yes      OBJECTIVE  Vitals:    07/08/24 0700 07/08/24 0800 07/08/24 0900 07/08/24 1000   BP: (!) 159/85 (!) 167/90 (!) 151/90 (!) 162/86   Pulse: 73 77 88 84   Resp: 15 15 12 11   Temp:  (!) 89.7 °F (32.1 °C)     TempSrc:  Oral     SpO2:       Weight:       Height:         EXAM:   GEN: awake alert and starting to follow commands    HEENT  -Head: NC/AT;  -Eyes: PERRL, EOMI. No discharge or redness;  -Ears: External ears are normal. Normal TMs.  -Nose: Normal nares.  -Mouth and throat: MMM. Normal gums, mucosa, palate,. Good dentition.  NECK: Supple, with no masses. No JVD   CV: RRR, no m/r/g.  LUNGS: CTAB, no w/r/c.  ABD: Soft, NT/ND, no masses, NTTP  SKIN: Warm, well perfused. No skin rashes or abnormal lesions.  EXT: No clubbing, cyanosis, or edema.  NEURO: she follows but is slow to do so.  Strength is symmetric.      LABS:   Na 130  K 3.6  Cr 0.51    WBC 9.0  Hb 12  Plt 
        [facility] ICU Progress Note        Gaby Stroud  1960  [id number]  7/11/2024      Assessment and plan:  Hemorrhagic CVA:  SAH in bilat sylvian fissure due to ruptured acomm aneurysm  -s/p coil embo of Acomm aneurysm 7/3/24 by Dr Stokes   -sodium 136 --> 134 --> 136.  cont salt tabs    -cont  ml/hr  -SBP < 180 is goal   -post keppra   -TCDs neg for vasospasm thus far     Acute hydrocephalus:  stable on imaging and clinical exam   -Related to SAH  -stable      Constipation:  -cont miralax 1-2 times daily    +  BM yesterday     Discussed with NIS team.  Discussed with case management team.        HPI:  Continues to improve.        ICU DAILY CHECKLIST     Code Status:full   DVT Prophylaxis:lovenox  T/L/D: PIVs  SUP: not indicated  Diet: regular  Activity Level: mobilize daily   ABCDEF Bundle/Checklist Completed:Yes  Disposition: ok for NS2  Multidisciplinary Rounds Completed: yes  Goals of Care Discussion/Palliative: yes  Patient/Family Updated: yes      OBJECTIVE  Vitals:    07/11/24 1141 07/11/24 1147 07/11/24 1150 07/11/24 1200   BP: (!) 146/82 (!) 151/102 (!) 152/88 (!) 160/102   Pulse:    99   Resp:    14   Temp:    98.5 °F (36.9 °C)   TempSrc:    Oral   SpO2:    98%   Weight:       Height:         EXAM:   GEN: awake alert and oriented   HEENT  -Head: NC/AT;  -Eyes: PERRL, EOMI. No discharge or redness;  -Ears: External ears are normal. Normal TMs.  -Nose: Normal nares.  -Mouth and throat: MMM. Normal gums, mucosa, palate,. Good dentition.  NECK: Supple, with no masses. No JVD   CV: RRR, no m/r/g.  LUNGS: CTAB, no w/r/c.  ABD: Soft, NT/ND, no masses, NTTP  SKIN: Warm, well perfused. No skin rashes or abnormal lesions.  EXT: No clubbing, cyanosis, or edema.  NEURO: Normal muscle strength and tone. No focal deficits.cranial nerves intact    LABS:   All labs reviewed    Imaging reviewed   7/7 HCT reviewed and is improved compared to prior.      Blossom Crystal MD    Pulmonary and Critical Care 
  Neurointerventional Surgery Progress Note      Admit Date: 7/2/2024   LOS: 1 day      Daily Progress Note: 7/3/2024    Assessment and Plan   #SAH in the bilateral sylvian fissures possibly d/t rupture of ACOM aneurysm. Dean Zhou I, Barrett grade II     Plan:  - DSA with possible embolization today   - Q1 neuro checks  - NPO   - Day 1/21 of Nimotop, 60 mg Q4 to prevent DCI   - Strict I/O  - SBP goal 100-160   - NaCl 100 ml/hr   - Keppra 500 mg BID for seizure prophylaxis  - Fioricet, Tylenol for headache control   - TTE pending  - Daily TCDs    HPI    64 y.o. L-H White (non-)  female w/ pmh of depression who presented to [facility] today, 7/2/24 reporting severe headache, N&V.  CTH revealed SAH around bilateral sylvian fissures. CTAh-n showed small left cavernous ICA aneurysm.  Pt reports that she first experienced sudden 6/10 headache on evening of Saturday 6/29/24, however she was able to fall asleep. She woke on 6/30 w/ 10/10 headache, fell back asleep and after waking again noticed that her right leg, \"wouldn't move,\" felt, \"slow to react\".  She reports sleeping most of 6/30, but experienced N&V w/ consumption of any food or drink. She reports taking asa for pain, called off work on 7/1, took Ativan and ibuprofen for pain and to help sleep.  She went to work today, but left to see her PCP who advised her to [facility] get CTH.  [facility] was notified and has been consulted for finding of SAH on CT and left ICA aneurysm of CTA.  Pt was transferred to [facility] for higher level of care and admitted to ICU. Vitals upon arrival: /102, Temp 97.9F, , SpO2 100% on RA, RR 19. Remarkable labs  include Na of 130, K 3.2.     Pt seen lying in bed in no acute distress. She reports 6/10 headache. Denies dizziness, weakness, numbness or tingling, vision changes, shortness of breath, and chest pain.  She denies tobacco and recreational drug use.  She endorses drinking etoh 3-4 days per week including beer, wine, and liquor.  She 
  Neurointerventional Surgery Progress Note    Admit Date: 2024   LOS: 13 days      Daily Progress Note: 7/15/2024    Assessment and Plan   Hemorrhagic Stroke - SAH in bilateral sylvian fissures d/t rupture of A-comm aneurysm. Hunt Zhou I, Barrett grade II.  S/p coil embolization of A-comm complex aneurysm on 7/3/2024 by Dr. Stokes on PBD 4    Hydrocephalus - Enlarged ventricles seen on CTH. Repeat CTH  stable.     Plan:  SBP goal 100-180. Labetalol, hydralazine prn  Q2 hr neuro checks  Day  of Nimotop, 60 mg Q4 given patient's risk for DCI  Strict I&Os. Goal euvolemic. Fluid management per primary team   Continue Gabapentin 200mg BID   Continue PRN Flexeril 5 mg TID for muscle spasm  Fioricet, acetaminophen, metoclopramide prn for headache  Lidocaine patch prn for back pain  Daily TCDs   Encourage activity as tolerated  Ok to discharge from an NIS standpoint when ready from a primary team standpoint. She will need to complete her 21 day course of Nimotop, this medication should be delivered to the patient prior to discharging. Discharge instructions added to discharge paperwork.    Plan discussed w/ Dr. Stokes, Hospitalist Team   HPI    64 y.o. White (non-) L-H female w/ PMH of depression who presented to [facility] 24 reporting severe headache, nausea, and vomiting.  She stated she had a severe headache on 24 and this is thought to be the initial bleed day.  CTH revealed SAH around bilateral sylvian fissures. CTAh-n showed small left cavernous ICA aneurysm. [facility] was contacted and advised for transfer to [facility] for higher level of care.  Pt underwent DSA and coil embolization of A-comm aneurysm on 7/3/2024 by Dr. Stokes on PBD #4.     Subjective    No acute events overnight. Patient resting in bed, she has a 2/10 headache. Back spasms have improved. Denies numbness, tingling,weakness, double vision, blurry vision.     Objective:   Vital signs  Temp (24hrs), Av.2 °F (36.8 °C), Min:97.6 °F (36.4 
  Neurointerventional Surgery Progress Note    Admit Date: 2024   LOS: 4 days      Daily Progress Note: 2024    Assessment and Plan   Hemorrhagic Stroke - SAH in bilateral sylvian fissures d/t rupture of A-comm aneurysm. Hunt Zhou I, Barrett grade II.  S/p coil embolization of A-comm complex aneurysm on 7/3/2024, PBD 4    Hydrocephalus - Enlarged ventricles seen on CTH. Repeat CTH today stable    Plan:  SBP goal 100-180  Q1hr neuro checks  Day  of Nimotop, 60 mg Q4 to prevent DC  Continue LR at 100mL/hr  Strict I&Os. Goal euvolemic  Continue Keppra 500mg BID for seizure ppx  Fioricet, acetaminophen, metoclopramide prn for headache  Daily TCDs  NICOM Q Shift  NSGY following for hydrocephalus, will make decision for EVD placement today; Hold enoxaparin pending decision      Plan discussed with Dr. Stokes, Intensivist, RN, and pt    HPI    64 y.o. White (non-) L-H female w/ PMH of depression who presented to [facility] 24 reporting severe headache, nausea, and vomiting.  CTH revealed SAH around bilateral sylvian fissures. CTAh-n showed small left cavernous ICA aneurysm. [facility] was contacted and advised for transfer to [facility] for higher level of care.  Pt underwent DSA and coil embolization of A-comm aneurysm on 7/3/2024 by Dr. Stokes.     Subjective    No acute events overnight. Pt seen resting in bed in no distress. She reports a 2/10 headache. She denies dizziness, vision changes, numbness or tingling, weakness, nausea, shortness of breath, and chest pain.    Objective:   Vital signs  Temp (24hrs), Av °F (37.2 °C), Min:98.7 °F (37.1 °C), Max:99.3 °F (37.4 °C)   No intake/output data recorded.   1901 -  0700  In: 4808.4 [P.O.:1400; I.V.:3374.8]  Out: 3450 [Urine:3450]  BP (!) 111/103   Pulse 75   Temp 99.3 °F (37.4 °C) (Oral)   Resp 14   Ht 1.575 m (5' 2\")   Wt 62.3 kg (137 lb 5.6 oz)   SpO2 97%   BMI 25.12 kg/m²        Vitals:    24 0500 24 0600 24 0647 24 0700 
  Neurointerventional Surgery Progress Note    Admit Date: 2024   LOS: 5 days      Daily Progress Note: 2024    Assessment and Plan   Hemorrhagic Stroke - SAH in bilateral sylvian fissures d/t rupture of A-comm aneurysm. Hunt Zhou I, Barrett grade II.  S/p coil embolization of A-comm complex aneurysm on 7/3/2024, PBD 4    Hydrocephalus - Enlarged ventricles seen on CTH. NSU following. Repeat CT Head ,  stable.     Plan:  SBP goal 100-180  Q1hr neuro checks  Day  of Nimotop, 60 mg Q4 to prevent DC  Continue LR at 100mL/hr  Strict I&Os. Goal euvolemic  Continue Keppra 500mg BID for seizure ppx  Fioricet, acetaminophen, metoclopramide prn for headache  Daily TCDs, today's TCD is negative for vasospasm   NICOM Q Shift  NSGY following for hydrocephalus, monitoring for possible EVD. No EVD at this time given stable headache and CT Head       Plan discussed with NIS attending Dr. Stokes, Intensivist Dr. Crystal, RN, and patient     HPI    64 y.o. White (non-) L-H female w/ PMH of depression who presented to [facility] 24 reporting severe headache, nausea, and vomiting.  CTH revealed SAH around bilateral sylvian fissures. CTAh-n showed small left cavernous ICA aneurysm. [facility]IS was contacted and advised for transfer to [facility] for higher level of care.  Pt underwent DSA and coil embolization of A-comm aneurysm on 7/3/2024 by Dr. Stokes.     Subjective    No acute events overnight. Patient resting in bed. 3/10 headache for which nursing is giving magnesium. She denies double vision, blurry vision, weakness, numbness, tingling.     Objective:   Vital signs  Temp (24hrs), Av.2 °F (36.8 °C), Min:97.8 °F (36.6 °C), Max:98.7 °F (37.1 °C)   No intake/output data recorded.   1901 -  0700  In: 3564.7 [I.V.:3564.7]  Out: 4525 [Urine:4525]  /67   Pulse 85   Temp 98.3 °F (36.8 °C) (Oral)   Resp 13   Ht 1.575 m (5' 2\")   Wt 60.4 kg (133 lb 2.5 oz)   SpO2 98%   BMI 24.35 kg/m²        Vitals:    
  Neurointerventional Surgery Progress Note    Admit Date: 2024   LOS: 6 days      Daily Progress Note: 2024    Assessment and Plan   Hemorrhagic Stroke - SAH in bilateral sylvian fissures d/t rupture of A-comm aneurysm. Hunt Zhou I, Barrett grade II.  S/p coil embolization of A-comm complex aneurysm on 7/3/2024, PBD 4    Hydrocephalus - Enlarged ventricles seen on CTH. NSU following. Repeat CT Head ,  stable.     Plan:  SBP goal 100-180  Q1hr neuro checks  Day  of Nimotop, 60 mg Q4 to prevent DC  Continue LR at 100mL/hr  Strict I&Os. Goal euvolemic  Continue Keppra 500mg BID for seizure ppx  Fioricet, acetaminophen, metoclopramide prn for headache  Daily TCDs, today's TCD is negative for vasospasm   NICOM Q Shift  NSGY following for hydrocephalus, monitoring for possible EVD. No EVD at this time given stable headache and CT Head       Plan discussed with NIS attending Dr. Stokes, Intensivist Dr. Crystal, RN, and patient     HPI    64 y.o. White (non-) L-H female w/ PMH of depression who presented to [facility] 24 reporting severe headache, nausea, and vomiting.  CTH revealed SAH around bilateral sylvian fissures. CTAh-n showed small left cavernous ICA aneurysm. [facility] was contacted and advised for transfer to [facility] for higher level of care.  Pt underwent DSA and coil embolization of A-comm aneurysm on 7/3/2024 by Dr. Stokes.     Subjective    No acute events overnight. Patient is resting in bed. She has a 2/10 headache. She was able to rest overnight. She denies numbness, tingling, weakness, double vision and blurry vision.     Objective:   Vital signs  Temp (24hrs), Av.9 °F (37.2 °C), Min:98.5 °F (36.9 °C), Max:99.3 °F (37.4 °C)   No intake/output data recorded.   1901 -  0700  In: 5213.7 [P.O.:1240; I.V.:3451.1]  Out: 5725 [Urine:5725]  BP (!) 159/85   Pulse 73   Temp 98.8 °F (37.1 °C) (Axillary)   Resp 15   Ht 1.575 m (5' 2\")   Wt 61.7 kg (136 lb 0.4 oz)   SpO2 98%   BMI 24.88 
  Neurointerventional Surgery Progress Note    Admit Date: 2024   LOS: 7 days      Daily Progress Note: 2024    Assessment and Plan   Hemorrhagic Stroke - SAH in bilateral sylvian fissures d/t rupture of A-comm aneurysm. Hunt Zhou I, Barrett grade II.  S/p coil embolization of A-comm complex aneurysm on 7/3/2024, PBD 4    Hydrocephalus - Enlarged ventricles seen on CTH. Repeat CTH  stable.     Plan:  SBP goal 100-180. Labetalol, hydralazine prn  Q1hr neuro checks, Ok for Q2 hr neuro checks hour overnight to promote rest  Day  of Nimotop, 60 mg Q4 to prevent DC  Continue NS at 100mL/hr  Strict I&Os. Goal euvolemic  Continue Keppra 500mg BID for seizure ppx  Fioricet, acetaminophen, metoclopramide prn for headache  Add lidocaine patch prn for back pain  Daily TCDs, today's TCD is negative for vasospasm   NICOM Q Shift  NSGY following peripherally for hydrocephalus      Plan discussed w/ Dr. Stokes, Intensivist, RN, and pt     HPI    64 y.o. White (non-) L-H female w/ PMH of depression who [facility] reporting severe headache, nausea, and vomiting.  CTH revealed SAH around bilateral sylvian fissures. CTAh-n showed small left cavernous ICA aneurysm. NIS was contacted and advised for transfer to [facility] for higher level of care.  Pt underwent DSA and coil embolization of A-comm aneurysm on 7/3/2024 by Dr. Stokes.     Subjective    No acute events overnight. Pt seen resting in bed. She reports 3/10 generalized headache and mild lower back pain. She denies numbness, tingling, weakness, double vision and blurry vision.     Objective:   Vital signs  Temp (24hrs), Av.4 °F (36.9 °C), Min:98 °F (36.7 °C), Max:98.8 °F (37.1 °C)   No intake/output data recorded.   1901 -  0700  In: 4848.6 [P.O.:1480; I.V.:3368.6]  Out: 4300 [Urine:4300]  BP (!) 140/78   Pulse 80   Temp 98.2 °F (36.8 °C) (Axillary)   Resp 12   Ht 1.575 m (5' 2\")   Wt 60 kg (132 lb 4.4 oz)   SpO2 98%   BMI 24.19 
  Neurointerventional Surgery Progress Note    Admit Date: 2024   LOS: 8 days      Daily Progress Note: 7/10/2024    Assessment and Plan   Hemorrhagic Stroke - SAH in bilateral sylvian fissures d/t rupture of A-comm aneurysm. Hunt Zhou I, Barrett grade II.  S/p coil embolization of A-comm complex aneurysm on 7/3/2024, PBD 4    Hydrocephalus - Enlarged ventricles seen on CTH. Repeat CTH  stable.     Plan:  SBP goal 100-180. Labetalol, hydralazine prn  Q1hr neuro checks, Ok for Q2 hr neuro checks hour overnight to promote rest  Day  of Nimotop, 60 mg Q4 to prevent DC  Continue NS at 100mL/hr  Strict I&Os. Goal euvolemic  Continue gabapentin 200mg BID   Fioricet, acetaminophen, metoclopramide prn for headache  Lidocaine patch prn for back pain  Daily TCDs, no evidence of vasospasm today  NICOM Q Shift  NSGY following peripherally for hydrocephalus  Encourage activity as tolerated      Plan discussed w/ Dr. Stokes, Intensivist, RN, and pt     HPI    64 y.o. White (non-) L-H female w/ PMH of depression who presented to [facility] 24 reporting severe headache, nausea, and vomiting.  CTH revealed SAH around bilateral sylvian fissures. CTAh-n showed small left cavernous ICA aneurysm. NIS was contacted and advised for transfer to [facility] for higher level of care.  Pt underwent DSA and coil embolization of A-comm aneurysm on 7/3/2024 by Dr. Stokes.     Subjective    Pt experienced severe 10/10 headaches overnight as well as neck and back pain. She was given a 500mL IVF bolus and gabapentin was added.  Pt seen resting in bed. She reports 3/10 generalized headache and mild lower back pain. She denies numbness, tingling, weakness, double vision and blurry vision.     Objective:   Vital signs  Temp (24hrs), Av.5 °F (36.9 °C), Min:98.2 °F (36.8 °C), Max:99.2 °F (37.3 °C)    1901 - 07/10 0700  In: 1554.7 [P.O.:200; I.V.:1354.7]  Out: 1750 [Urine:1750]  07/08 0701 -  190  In: 4961.1 [P.O.:1490; 
  Neurointerventional Surgery Progress Note    Admit Date: 2024   LOS: 9 days      Daily Progress Note: 2024    Assessment and Plan   Hemorrhagic Stroke - SAH in bilateral sylvian fissures d/t rupture of A-comm aneurysm. Hunt Zhou I, Barrett grade II.  S/p coil embolization of A-comm complex aneurysm on 7/3/2024, PBD 4    Hydrocephalus - Enlarged ventricles seen on CTH. Repeat CTH  stable.     Plan:  SBP goal 100-180. Labetalol, hydralazine prn  Q2 hr neuro checks  Day  of Nimotop, 60 mg Q4 to prevent DC  Continue NS at 100mL/hr  Strict I&Os. Goal euvolemic  Continue gabapentin 200mg BID   Fioricet, acetaminophen, metoclopramide prn for headache  Lidocaine patch prn for back pain  Daily TCDs, no evidence of vasospasm today  NSGY following peripherally for hydrocephalus  Encourage activity as tolerated  Ok for NSTU transfer from [facility] view      Plan discussed w/ Dr. Stokes, Intensivist, RN, and pt     HPI    64 y.o. White (non-) L-H female w/ PMH of depression who presented to [facility] 24 reporting severe headache, nausea, and vomiting.  CTH revealed SAH around bilateral sylvian fissures. CTAh-n showed small left cavernous ICA aneurysm. [facility] was contacted and advised for transfer to [facility] for higher level of care.  Pt underwent DSA and coil embolization of A-comm aneurysm on 7/3/2024 by Dr. Stokes.     Subjective    No acute events overnight. Pt seen resting in bed. She reports 2/10 generalized headache and mild lower back pain. She denies numbness, tingling, weakness, double vision and blurry vision.     Objective:   Vital signs  Temp (24hrs), Av.7 °F (37.1 °C), Min:98.4 °F (36.9 °C), Max:99.2 °F (37.3 °C)   701 -  190  In: 274.9 [I.V.:274.9]  Out: -    190 -  0700  In: 4396.2 [P.O.:600; I.V.:3796.2]  Out: 4550 [Urine:4550]  BP (!) 156/85   Pulse 90   Temp 98.4 °F (36.9 °C) (Oral)   Resp 17   Ht 1.575 m (5' 2.01\")   Wt 59.7 kg (131 lb 9.8 oz)   SpO2 94%   BMI 24.07 
  Neurointerventional Surgery Progress Note    Admit Date: 7/2/2024   LOS: 10 days      Daily Progress Note: 7/12/2024    Assessment and Plan   Hemorrhagic Stroke - SAH in bilateral sylvian fissures d/t rupture of A-comm aneurysm. Hunt Zhou I, Barrett grade II.  S/p coil embolization of A-comm complex aneurysm on 7/3/2024 by Dr. Stokes on PBD 4    Hydrocephalus - Enlarged ventricles seen on CTH. Repeat CTH 7/6 stable.     Plan:  SBP goal 100-180. Labetalol, hydralazine prn  Q2 hr neuro checks  Day 10/21 of Nimotop, 60 mg Q4 given patient's risk for DCI  Fluids currently stopped, closely monitor fluid PO intake  Strict I&Os. Goal euvolemic (currently +353 ml for the shift and +4041.1 since admission)  Continue gabapentin 200mg BID   Continue PRN Flexeril 5 mg tid for muscle spasm  Fioricet, acetaminophen, metoclopramide prn for headache  Lidocaine patch prn for back pain  Daily TCDs, no evidence of vasospasm today though poorly visualized  Encourage activity as tolerated  Ok for NSTU transfer from [facility] view, will need stepdown      Plan discussed w/ Dr. Stokes, Intensivist team, Hospitalist team, RN, and patient     HPI    64 y.o. White (non-) L-H female w/ PMH of depression who presented to [facility] 7/2/24 reporting severe headache, nausea, and vomiting.  She stated she had a severe headache on 06/29/24 and this is thought to be the initial bleed day.  CTH revealed SAH around bilateral sylvian fissures. CTAh-n showed small left cavernous ICA aneurysm. [facility] was contacted and advised for transfer to [facility] for higher level of care.  Pt underwent DSA and coil embolization of A-comm aneurysm on 7/3/2024 by Dr. Stokes on PBD #4.     Subjective      No acute events overnight. Pt seen resting in bed. She reports 1/10 generalized headache and mild lower back pain associated with intermittent spasms that are more of a 4/10 pain. She denies numbness, tingling, weakness, acute vision changes, nausea, vomiting, chest pain, abdominal 
  Neurointerventional Surgery Progress Note    Admit Date: 7/2/2024   LOS: 12 days      Daily Progress Note: 7/14/2024    Assessment and Plan   Hemorrhagic Stroke - SAH in bilateral sylvian fissures d/t rupture of A-comm aneurysm. Hunt Zhou I, Barrett grade II.  S/p coil embolization of A-comm complex aneurysm on 7/3/2024 by Dr. Stokes on PBD 4    Hydrocephalus - Enlarged ventricles seen on CTH. Repeat CTH 7/6 stable.     Plan:  SBP goal 100-180. Labetalol, hydralazine prn  Q2 hr neuro checks  Day 12/21 of Nimotop, 60 mg Q4 given patient's risk for DCI  Strict I&Os. Goal euvolemic. Fluid management per primary team   Continue Gabapentin 200mg BID   Continue PRN Flexeril 5 mg TID for muscle spasm  Fioricet, acetaminophen, metoclopramide prn for headache  Lidocaine patch prn for back pain  Daily TCDs, today's TCD limited, no vasospasm   Encourage activity as tolerated  Ok to discharge from an NIS standpoint when ready from a primary team standpoint. She will need to complete her 21 day course of Nimotop, this medication should be delivered to the patient prior to discharging    Plan discussed w/ Dr. Stokes, Hospitalist Team   HPI    64 y.o. White (non-) L-H female w/ PMH of depression who presented to [facility] 7/2/24 reporting severe headache, nausea, and vomiting.  She stated she had a severe headache on 06/29/24 and this is thought to be the initial bleed day.  CTH revealed SAH around bilateral sylvian fissures. CTAh-n showed small left cavernous ICA aneurysm. [facility] was contacted and advised for transfer to [facility] for higher level of care.  Pt underwent DSA and coil embolization of A-comm aneurysm on 7/3/2024 by Dr. Stokes on PBD #4.     Subjective      No acute event overnight. Patient is resting in bed with her sister Heather at bedside. She has minimal headache, also notes neck/back spasm. Denies numbness, tingling, weakness, double vision, blurry vision.     Educated about lifting restricting of 10 pounds and avoiding 
  Neurointerventional Surgery Progress Note    Admit Date: 7/2/2024   LOS: 14 days      Daily Progress Note: 7/16/2024    Assessment and Plan   Hemorrhagic Stroke - SAH in bilateral sylvian fissures d/t rupture of A-comm aneurysm. Hunt Zhou I, Barrett grade II.  S/p coil embolization of A-comm complex aneurysm on 7/3/2024 by Dr. Stokes on PBD 4    Hydrocephalus - Enlarged ventricles seen on CTH. Repeat CTH 7/6 stable.     Plan:  SBP goal 100-180. Labetalol, hydralazine prn  Q2 hr neuro checks  Day 14/21 of Nimotop, 60 mg Q4 given patient's risk for DCI  Strict I&Os. Goal euvolemic. Fluid management per primary team   Continue Gabapentin 200mg BID   Continue PRN Flexeril 5 mg TID for muscle spasm  Fioricet, acetaminophen, metoclopramide prn for headache  Lidocaine patch prn for back pain  Daily TCDs   Encourage activity as tolerated  Ok to discharge from an NIS standpoint when ready from a primary team standpoint. She will need to complete her 21 day course of Nimotop, this medication should be delivered to the patient prior to discharging. Discharge instructions added to discharge paperwork.    Plan discussed w/ Dr. Stokes, Hospitalist Team   HPI    64 y.o. White (non-) L-H female w/ PMH of depression who presented to [facility] 7/2/24 reporting severe headache, nausea, and vomiting.  She stated she had a severe headache on 06/29/24 and this is thought to be the initial bleed day.  CTH revealed SAH around bilateral sylvian fissures. CTAh-n showed small left cavernous ICA aneurysm. [facility] was contacted and advised for transfer to [facility] for higher level of care.  Pt underwent DSA and coil embolization of A-comm aneurysm on 7/3/2024 by Dr. Stokes on PBD #4.     Subjective    No acute events overnight. Patient resting in bed, she has already been up this morning ambulating in her room.  She states her headache is not really any issue for her, but she is still having issues with back spasms.  She otherwise denies acute vision 
0030: Pt is fluid responsive, SVI=40.3%  
0035: Pt is fluid responsive, SVI= 99.6%  
1436: NICOM completed. Pt fluid responsive. Delta SVI = 20.3%  
1752: NICOM completed at bedside. Pt fluid responsive. Delta SVI = 51.7%  
Attempted visit for [facility] patient.  Patient unavailable at this time.  
Attempted visit for [facility] patient.  Patient unavailable at this time.  
Comprehensive Nutrition Assessment    Type and Reason for Visit: Initial, RD Nutrition Re-Screen/LOS    Nutrition Recommendations/Plan:     -Start ONS: Magic cup @ dinner    -Encourage oral intake         Malnutrition Assessment:  Malnutrition Status:  At risk for malnutrition (Comment) (07/10/24 5504)    Context:  Acute Illness     Findings of the 6 clinical characteristics of malnutrition:  Energy Intake:  50% or less of estimated energy requirements for 5 or more days  Weight Loss:  No significant weight loss     Body Fat Loss:  No significant body fat loss     Muscle Mass Loss:  No significant muscle mass loss    Fluid Accumulation:  No significant fluid accumulation     Strength:  Not Performed       Nutrition Assessment:    Pt admitted with SAH d/t ruptured aneurysm. PMHx: Depression. Cerebral angiogram and coil embolization of aneurysm 7/3. Day 8/21 of Nimotop; getting daily TCD's. Severe H/A's-medication being adjusted. Salt tablets ordered during IDR.    Pt well-nourished PTA; not at risk for malnutrition. Appetite is poor-consuming 25% of meals on average. Family attentive and assisting patient with hospital meal selections. Encouraged to bring food from outside as well. Pt snacking on potato chips prior to RD visit. Declined Ensure supplements but willing to try Magic cup.       Nutritionally Significant Medications:  Nimotop, Glycolax, NS @ 100 ml/hr, Sodium chloride tablet 1 gm with meals      Estimated Daily Nutrient Needs:  Energy Requirements Based On: Formula  Weight Used for Energy Requirements: Admission (60 kg)  Energy (kcal/day): 1450 (MSJ x 1.3)  Weight Used for Protein Requirements: Admission  Protein (g/day): 72-78 (1.2-1.3g/kg)  Method Used for Fluid Requirements: 1 ml/kcal  Fluid (ml/day):      Nutrition Related Findings:   Edema: None                      Last BM: 07/10/24    Wounds:   Wound Type: None      Current Nutrition Therapies:  Diet: Regular  Supplements: none  Meal Intake: 
Ct stable.  Awake alert. Gcs 15.  Will follow clinically/peripherally  Call for signs of hcp.  Successfully coiled acom.  Further mgmt per nis  
[facility]  Ms. Stroud is [employer]. She was not in her room at the time of the visit. No family was present.  Prayer for spiritual communion offered for her well-being.     Sr. JOHN Ibarra, RN, ACSW, LCSW   Page:  [phone]  
Gaby was off the floor when I made rounds.  Father Alverto Aldana  
HOD 4  C/o headache - no worse than before  Alert  Answers questions  Photophobia (unchanged)  FC x 4  CT stable ventricular size  A/P: Acomm aneurysm rupture, mild ventricular dilatation  Monitoring for possible EVD  Headache and CT stable - no EVD at this time  
HOD 5  C/o headache varies 3-7/10  Na 135  Afeb VSS  Alert  Answers questions  Photophobia (unchanged)  FC x 4  CT yesterday stable ventricular size  A/P: Acomm aneurysm rupture, mild ventricular dilatation  Neuro exam and symptoms stable  
Neurocritical Care Brief Progress Note:    Hemorrhagic Stroke - SAH in bilateral sylvian fissures d/t rupture of A-comm aneurysm. Hunt Zhou I, Barrett grade II.  S/p coil embolization of A-comm complex aneurysm on 7/3/2024.    TCD 7/7/24 : poorly visualized window, no evidence of vasospasm.    At the time patient was seen resting in bed. She states her current headache is 1/10 and that she is comfortable.  She denies any acute vision changes or having any other discomfort.      GEN: Cooperative, Calm, Well developed, well nourished patient in NAD  HEENT: Normocephalic. Non-icter, no congestion  Lungs:  No shortness of breath or increased work of breathing noted during exam. Currently on room air.  Cardiac: RRR  Extremities: 2+ Radial pulses, no clubbing, cyanosis, or edema  Skin:Skin warm and color appropriate for ethnicity.     NEURO:  Mental status: Oriented to time, situation, place and person. Able to follow commands appropriately  Cranial Nerves:  II-XII intact; Attention and fund of knowledge is appropriate/intact. Speech/Language is intact/fluent. Recent and remote memory appear intact. EOMI, PERRL, 4mm, no nystagmus, no ptosis. No dysarthria and no aphasia noted. Full facial strength, no asymmetry. Hearing intact bilaterally. Tongue protrudes to midline, palate elevates symmetrically. Shrug Shoulders B/L  Motor: Normal bulk and tone, 5/5 strength x 4 extremities proximally and distally; No involuntary movements.  Coordination: Intact FTN and HTS testing  Sensation: Intact x 4 extremities to light touch  Gait: Deferred     - Continue plan per NIS.    Discussed with ICU Intensivist team, Primary nurse, Beatriz Duran NP.    spent 35 minutes of time involved in chart review, lab review, imaging review       Brii Savage APRN - NP  Neurocritical Care Nurse Practitioner  [phone]    
Neurocritical Care Brief Progress Note:    Patient will be discharging home today.  Nimotop is at bedside and patient verbalizing understanding that she must take this for 7 more days and discharge instructions were updated accordingly.        JEANETTE Boyce - NP  Neurocritical Care Nurse Practitioner  [phone]    
Neurocritical Care Brief Progress Note:    Pt here on 7/2/24 for severe HA, N&V, found to have SAH d/t ruptured A-comm aneurysm revealed on imaging. S/p coil embolization of A-comm complex aneurysm on 7/3/24.  Post-procedure CTH concerning for hydrocephalus, NSGY evaluated and ultimately decided against EVD placement given stable appearance of repeat CTH and stable neuro exam. TCDs have thus far been negative for vasospasm.    No acute events today. Pt reporting intermittent headaches as well as back spasms and leg pain exacerbated by movement. She's felt to be ready for discharge per NIS team, however pt requests another PT/OT evaluation w/ recommendation for discharge plan. Also, pt pharmacy not open today to provide home nimodipine doses.    Pt seen lying in bed in no distress. She reports 2/10 generalized headache.  Denies dizziness, vision changes, weakness, numbness or tingling, nausea, shortness of breath, and chest pain.      Physical Exam:  Gen: NAD, calm, cooperative  Neuro: A&Ox4. Follows commands. Speech clear. Affect normal. PERRL, 3 mm bilaterally. Blinks to threat. No disconjugate gaze present. EOMI. Face symmetric. Palate symmetric. Tongue midline. Vernon spontaneously. Strength 5/5 in UE and LE BL. Negative drift. Bulk and tone normal. No involuntary movements. Gait deferred.  Skin: Warm, dry, color appropriate for ethnicity.       JEANETTE Tomas - NP  Neurovascular Nurse Practitioner    
Neurocritical Care Brief Progress Note:    S/P coil embolization of a-comm aneurysm 7/3/24  SAH in bilat sim fissures HH I, FG II     Physical Exam:  Gen: NAD, calm, cooperative  Neuro: A&Ox4. Follows commands. Speech clear. Affect normal. PERRL, 3 mm bilaterally. Blinks to threat. No disconjugate gaze present. EOMI. Face symmetric. Palate symmetric. Tongue midline. Vernon spontaneously. Strength 5/5 in UE and LE BL. Negative drift. Bulk and tone normal. No involuntary movements. Gait deferred.  Skin: Warm, dry, color appropriate for ethnicity. R groin arteriotomy site soft and non-tender on palpation, dressing is C/D/I, with no active bleeding or drainage noted.       2030: Pt seen at bedside in ICU. 0/10 headache. No numbness, tingling, weakness, headache, nausea or visual changes. TCDs reviewed. No evidence of vasospasm.       0530: AM CTH completed. No acute events overnight. Pain well controlled with Fioricet. +1.6L currently.     Plan:   - -160  - CTH stable  - TCDs ordered for AM  - Fioricet and Tylenol prn headache  - Keppra 500mg BID for seizure prophylaxis  - Day 2/21 Nimotop 60mg q4h to prevent DCI    JEANETTE Lechuga - CNP  Neurocritical Care Nurse Practitioner  [phone]   
Neurocritical Care Brief Progress Note:    SAH d/t a-comm aneurysm rupture. HHI, Barrett Grade II  S/P coil embolization of a-comm aneurysm on 7/3/24     Physical Exam:  Gen: NAD, calm, cooperative  Neuro: A&Ox4. Follows commands. Speech clear. Affect normal. PERRL, 3 mm bilaterally. Blinks to threat. No disconjugate gaze present. EOMI. Face symmetric. Palate symmetric. Tongue midline. Vernon spontaneously. Strength 5/5 in UE and LE BL. Negative drift. Bulk and tone normal. No involuntary movements. Gait deferred.  Skin: Warm, dry, color appropriate for ethnicity.     2030: Pt resting well. Pain controlled by PRNs. Rates head/neck pain 5/10. No nausea/vomiting, numbness or tingling. TCDs from today reviewed. No evidence of vasospasm.     0645: No acute events overnight. Neuro exam stable. TCDs ordered for tomorrow AM.     Plan:   - Stable for transfer to [facility]   - Q2h neuro checks  - SBO gaol 100-180. PRN labetalol, hydralazine.   - Day 10/21 Nimotop 60mg q4h for DC prevention  - Strict I&O with goal euvoluemia   - Daily TCDs- today's reviewed. No evidence of vasospasm  - PRN Fioricet, tylenol, reglan for headache  - PRN lidocaine patch for back pain  - Cont Gabapentin 200mg BID  - Neurosurgery following peripherally for hydrocephalus    JEANETTE Lechuga - CNP  Neurocritical Care Nurse Practitioner  [phone]   
Neurocritical Care Brief Progress Note:    SAH d/t a-comm aneurysm rupture. Hhi, Barrett Grade II  S/P coil embolization of a-comm aneurysm on 7/3/24    Physical Exam:  Gen: NAD, calm, cooperative  Neuro: A&Ox4. Follows commands. Speech clear. Affect normal. PERRL, 3 mm bilaterally. Blinks to threat. No disconjugate gaze present. EOMI. Face symmetric. Palate symmetric. Tongue midline. Vernon spontaneously. Strength 5/5 in UE and LE BL. Negative drift. Bulk and tone normal. No involuntary movements. Gait deferred.  Skin: Warm, dry, color appropriate for ethnicity.     2030: Seen at bedside in ICU. Pain in head 4/10. Using PRNs appropriately. Spoke at length about transfer to [facility] and discharge planning including plans for ADLs and eventual return to work. Emphasized the presence of deconditioning secondary to hospitalization and worked to establish expectations over the next several weeks. Pt with slight short term memory deficit in conversation, but otherwise neurologically intact.    0400: Complaints of muscle spasm in back. Flexiril 5mg TID PRN ordered.     0700: No additional events overnight. Neuro exam stable. Na 135. Remains on salt tabs. +475. TCDs pending for this AM.     Plan:   - Stable for transfer to [facility]   - Q2h neuro checks  - SBO gaol 100-180. PRN labetalol, hydralazine.   - Day 9/21 Nimotop 60mg q4h for DC prevention  - Strict I&O with goal euvoluemia  - NS at 100mL/Hr  - Daily TCDs- today's reviewed. No evidence of vasospasm  - PRN Fioricet, tylenol, reglan for headache  - PRN lidocaine patch for back pain  - Cont Gabapentin 200mg BID  - Neurosurgery following peripherally for hydrocephalus          Alejandra Rodriguez, APRN - CNP  Neurocritical Care Nurse Practitioner  [phone]   
Neurocritical Care Brief Progress Note:    SAH in bilateral sylvian fissures d/t rupture of A-comm aneurysm. Hunt Zhou I, Barrett grade II. S/p coil embolization of A-comm complex aneurysm on 7/3/2024 by Dr. Stokes    Physical Exam:  Gen: NAD, calm, cooperative  Neuro: A&Ox4. Follows commands. Speech clear. Affect normal. PERRL, 3 mm bilaterally. Blinks to threat. No disconjugate gaze present. EOMI. Face symmetric. Palate symmetric. Tongue midline. Vernon spontaneously. Strength 5/5 in UE and LE BL. Negative drift. Bulk and tone normal. No involuntary movements. Gait deferred.  Skin: Warm, dry, color appropriate for ethnicity.       Plan:  SBP goal 100-180. Labetalol, hydralazine prn  Q2 hr neuro checks  Day 10/21 of Nimotop, 60 mg Q4 given patient's risk for DCI  Fluids currently stopped, closely monitor fluid PO intake  Strict I&Os. Goal euvolemic   Continue gabapentin 200mg BID   Continue PRN Flexeril 5 mg tid for muscle spasm  Fioricet, acetaminophen, metoclopramide prn for headache  Lidocaine patch prn for back pain  Daily TCDs, no evidence of vasospasm today    Alejandra Rodriguez, APRN - CNP  Neurocritical Care Nurse Practitioner  [phone]   
Neurocritical Care Brief Progress Note:   07/02/24~Pt transferred from [facility] to [facility] with SAH due to ruptured acomm aneurysm     07/03/24~Patient is s/p coil embolization of anterior communicating artery complex aneurysm on by Dr. Stokes. CTH post coiling is stable. Baseline TCD's showed no evidence of vsp. The IVC appears to collapse less than 50%.    07/04/24~repeat CTH shows evolutionary changes involving the diffuse SAH. Minimal IVH involving the occipital horns, decreased on the prior study. No hydrocephalus.    07/05/24~repeat CTH showed unchanged SAH. Stable ventricular size.     07/06/24~repeat CTH with slightly improved subarachnoid hemorrhage. Stable ventricles.   NSGY reviewed head imaging. no EVD at this time. Lovenox has being resumed     07/08/24~TCD's with no evidence of vasospasm in the visualized intracranial arterial segments.     Patient c/o 2/10 headache otherwise no other complaints. Remained neurologically intact. On LR 100cc/hr with current IO +591L daily total. Lab reviewed. N+136, K+ 3.6, Mag 2.2, and Phos 3.6. Pending morning labs    0630  N+ is 134. LR switched to NS overnight. No acute events. Remained neuro intact. +IO euvolemia.   Review of Systems:   +headache, denied visual issues, no ear nose, throat problems; no coughing or wheezing or shortness of breath, No chest pain or orthopnea, no abdominal pain, nausea or vomiting, No pain in the body or extremities, no psychiatric, neurological, endocrine, hematological or cardiac complaints except as noted above.     Physical Exam:   Blood pressure (!) 149/81, pulse 88, temperature 98.2 °F (36.8 °C), temperature source Oral, resp. rate 16, height 1.575 m (5' 2\"), weight 61.7 kg (136 lb 0.4 oz), SpO2 98 %. Net IO Since Admission: 2,990.69 mL [07/08/24 2048]    GEN: Cooperative, Calm, Well developed, well nourished patient in NAD  HEENT: Normocephalic. Non-icter, no congestion  Lungs: Diminished right lobes ant, non-labored breathing, 
Neurocritical Care Brief Progress Note:   07/02/24~Pt transferred from [facility] to [facility] with SAH due to ruptured acomm aneurysm     07/03/24~Patient is s/p coil embolization of anterior communicating artery complex aneurysm on by Dr. Stokes. CTH post coiling is stable. Baseline TCD's showed no evidence of vsp. The IVC appears to collapse less than 50%.    07/04/24~repeat CTH shows evolutionary changes involving the diffuse SAH. Minimal IVH involving the occipital horns, decreased on the prior study. No hydrocephalus.    07/05/24~repeat CTH showed unchanged SAH. Stable ventricular size.     07/06/24~repeat CTH with slightly improved subarachnoid hemorrhage. Stable ventricles.   NSGY reviewed head imaging. no EVD at this time. Lovenox has being resumed     07/09/24~TCD's with no evidence of vsp    Patient c/o 10/10 headache as well as LBP with neck pain. Lidocaine patch started today with no improvement. Will start low dose Neurontin 200 mg BID. Otherwise, she remained neurologically intact. She is currently -477. Will give 500cc bolus NS and continue with 100cc/hr. Lab reviewed. N+134, K+ 4.0, Mag 2.0, and Phos 3.0. Pending morning labs    0600:  Pt headache and back pain improved overnight. No new compliant.Remained neuro intact. AM labs done~stable N+134, K+ 3.9, Mag 2.1, and Phos 2.9 IO is -.259.   Review of Systems:   +headache, neck pain, and LBP. Denied visual issues, no ear nose, throat problems; no coughing or wheezing or shortness of breath, No chest pain or orthopnea, no abdominal pain, nausea or vomiting, No pain in the body or extremities, no psychiatric, neurological, endocrine, hematological or cardiac complaints except as noted above.     Physical Exam:   Blood pressure (!) 156/81, pulse 96, temperature 99.2 °F (37.3 °C), temperature source Oral, resp. rate 17, height 1.575 m (5' 2\"), weight 60 kg (132 lb 4.4 oz), SpO2 98 %. Net IO Since Admission: 2,953.08 mL [07/09/24 2132]    GEN: Cooperative, Calm, Well 
Neurocritical Care Brief Progress Note:   07/02/24~Pt transferred from [facility] to [facility] with SAH due to ruptured acomm aneurysm     07/03/24~Patient is s/p coil embolization of anterior communicating artery complex aneurysm on by Dr. Stokes. CTH post coiling is stable. Baseline TCD's showed no evidence of vsp. The IVC appears to collapse less than 50%.    07/04/24~repeat CTH shows evolutionary changes involving the diffuse SAH. Minimal IVH involving the occipital horns, decreased on the prior study. No hydrocephalus.    07/05/24~repeat CTH showed unchanged SAH. Stable ventricular size.     07/06/24~repeat CTH with slightly improved subarachnoid hemorrhage. Stable ventricles. NSGY reviewed head imaging. no EVD at this time. Lovenox resumed     07/09/24~TCD's with no evidence of vsp. IVC appears to collapse less than 50%.     Patient c/o mild headache and admits improvement with LBP and neck pain. Patient is seen resting in bed in [facility]. Patient is pleasant tonight and appreciative for the care    Reviewed labs: N+ is 134, K+ 3.9, M+ 2.1, and Phos 2.9. Pending morning labs. IO is -185. Overall she is +3L. Euvolemia. Will continue to monitor overnight and bolus as needed. Continue NS at 100ml/hr for now     06:00  N+ improved from 134 to 136. K+3.7. IO is -200ml. Continue with current rate NS at 100ml/hr. Remained neuro intact. No complaints overnight.     Review of Systems:   Mild headache, otherwise denied neck pain, LBP, no visual issues, no ear nose, throat problems; no coughing or wheezing or shortness of breath, No chest pain or orthopnea, no abdominal pain, nausea or vomiting, No pain in the body or extremities, no psychiatric, neurological, endocrine, hematological or cardiac complaints except as noted above.     Physical Exam:   Blood pressure (!) 146/83, pulse 93, temperature 98.4 °F (36.9 °C), temperature source Oral, resp. rate 19, height 1.575 m (5' 2.01\"), weight 59.7 kg (131 lb 9.8 oz), SpO2 92 %. Net IO Since 
Neurocritical Care Brief Progress Note:  64-year-old female transfer from [facility] to [facility] with Subarachnoid hemorrhage due to ruptured acomm aneurysm on 07/02/24.     Patient is s/p coil embolization of anterior communicating artery complex aneurysm on 07/03/24 by Dr. Stokes.     07/03/24~post coiling CTH stable    07/03/24~TCD's showed  no evidence of vasospasm in the bilateral PCA or in the visualized right MCA segment. The IVC appears to collapse less than 50%.    07/04/24~repeat CTH shows evolutionary changes involving the diffuse SAH. No new mass effect. Minimal IVH involving the occipital horns, decreased on the prior study. No hydrocephalus.    Patient denied any pain or discomfort. Remained neurologically intact without any new neuro issues. NS switched to LR beginning of shift. Currently on 100cc/hr with current i/o +177 daily total. Lab reviewed N+138, K+3.2 (replaced), mag 2.0. pt w/ high crp, no fever, no leuko, likely inflammatory process post surgery. Pending morning labs     0030:  Patient c/o 3/10 headache with prn Fioricet given without improvement. Prn tylenol and Reglan given. Current IO is +460    0600:  N+ 139, k+ 3.7, Mag 1.8, and phos 2.6. current IO +212 euvolemia. Continue with current IVF rate. Headache improved. No neuro issues overnight. Remained neuro intact    Review of Systems:   No headache, eye, ear nose, throat problems; no coughing or wheezing or shortness of breath, No chest pain or orthopnea, no abdominal pain, nausea or vomiting, No pain in the body or extremities, no psychiatric, neurological, endocrine, hematological or cardiac complaints except as noted above.     Physical Exam:   Blood pressure (!) 144/58, pulse (!) 103, temperature 98.1 °F (36.7 °C), temperature source Oral, resp. rate 14, height 1.575 m (5' 2\"), weight 61.7 kg (136 lb 0.4 oz), SpO2 98 %. Net IO Since Admission: 2,156.66 mL [07/04/24 2103]    GEN: Cooperative, Calm, Well developed, well nourished patient in 
Neurocritical Care Brief Progress Note:  64-year-old female transfer from [facility] to [facility] with Subarachnoid hemorrhage due to ruptured acomm aneurysm on 07/02/24.     Patient is s/p coil embolization of anterior communicating artery complex aneurysm on 07/03/24 by Dr. Stokes.     07/03/24~post coiling CTH stable    07/03/24~TCD's showed  no evidence of vasospasm in the bilateral PCA or in the visualized right MCA segment. The IVC appears to collapse less than 50%.    07/04/24~repeat CTH shows evolutionary changes involving the diffuse SAH. No new mass effect. Minimal IVH involving the occipital horns, decreased on the prior study. No hydrocephalus.    07/05/24~repeat CTH showed unchanged subarachnoid and hemorrhage. Stable ventricular size.     TCD's with limited study with a poor ultrasound window at the temple bilaterally. No evidence of vasospasm in the visualized intracranial arterial segments.     Patient denied any pain or discomfort. Remained neurologically intact without any new neuro issues. On LR 100cc/hr with current IO +1.5l daily total. Lab reviewed. 's. Lovenox currently on-hold per NSGY with pending repeat CTH in the morning for potential EVD placement based on CTH findings   Review of Systems:   No headache, eye, ear nose, throat problems; no coughing or wheezing or shortness of breath, No chest pain or orthopnea, no abdominal pain, nausea or vomiting, No pain in the body or extremities, no psychiatric, neurological, endocrine, hematological or cardiac complaints except as noted above.     Physical Exam:   Blood pressure (!) 150/56, pulse 92, temperature 99.3 °F (37.4 °C), temperature source Oral, resp. rate 14, height 1.575 m (5' 2\"), weight 59 kg (130 lb 1.1 oz), SpO2 96 %. Net IO Since Admission: 3,442.86 mL [07/05/24 2216]    GEN: Cooperative, Calm, Well developed, well nourished patient in NAD  HEENT: Normocephalic. Non-icter, no congestion  Lungs: Diminished bilaterally ant, non-labored 
Neurocritical Care Brief Progress Note:  64-year-old female transfer from [facility] to [facility] with Subarachnoid hemorrhage due to ruptured acomm aneurysm on 07/02/24.     Patient is s/p coil embolization of anterior communicating artery complex aneurysm on 07/03/24 by Dr. Stokes.     07/03/24~post coiling CTH stable    07/03/24~TCD's showed  no evidence of vasospasm in the bilateral PCA or in the visualized right MCA segment. The IVC appears to collapse less than 50%.    07/04/24~repeat CTH shows evolutionary changes involving the diffuse SAH. No new mass effect. Minimal IVH involving the occipital horns, decreased on the prior study. No hydrocephalus.    07/05/24~repeat CTH showed unchanged subarachnoid and hemorrhage. Stable ventricular size.     07/06/24~repeat CTH with slightly improved subarachnoid hemorrhage. Stable ventricles.     NSGY reviewed head imaging. no EVD at this time. Lovenox has being resumed     TCD's with poorly seen intracanial vessels bilaterally. No vsp in the bilateral PCA or the limited MCA and basilar segments identified. IVC appears to collapse at or near 50%.    Patient denied any pain or discomfort. Remained neurologically intact without any new neuro issues. On LR 100cc/hr with current IO -822L daily total. Day shift IO not accurate. Only 501 intake documented. Patient n LR at 100ml/hr totaling approx 1200cc    Lab reviewed. 138 N+, 3.7 K+, 2.0 Mag, and 3.6 Phos. Pending morning labs  Review of Systems:   No headache, eye, ear nose, throat problems; no coughing or wheezing or shortness of breath, No chest pain or orthopnea, no abdominal pain, nausea or vomiting, No pain in the body or extremities, no psychiatric, neurological, endocrine, hematological or cardiac complaints except as noted above.     Physical Exam:   Blood pressure 134/81, pulse 93, temperature 98.2 °F (36.8 °C), temperature source Oral, resp. rate 10, height 1.575 m (5' 2\"), weight 62.3 kg (137 lb 5.6 oz), SpO2 97 %. Net IO 
Neurocritical Care Brief Progress Note:  Briefly, patient is a 64 year old female w/pmh of depression who presents with SAH in the bilateral sylvian fissures,possibly d/t rupture of ACOM aneurysm. She underwent ACOM aneurysm embolization today by Dr. Stokes.    Currently, patient is resting in bed. States that she has 2/10 headache.     Physical Exam:  Gen: NAD, calm, cooperative  Neuro: AAOx4. Follows commands. Speech clear. Affect normal. PERRL, 3 mm bilaterally. VFF. Face symmetric. Tongue midline. Antigravity throughout. Sensation intact to light touch throughout. FNF w/no ataxia. Gait deferred.   Skin: Warm, dry, color appropriate for ethnicity. Groin arteriotomy site soft and non-tender on palpation, dressing is C/D/I, with no active bleeding or drainage noted.     NIHSS 0     Plan:  - -160  - Neuro checks per post EVT protocol   - CT Head reviewed, stable SAH in comparison to CT Head this AM    - Monitor for neurological decline, obtain STAT CT Head for neurological deterioration     JEANETTE Beach - CNP  Neurocritical Care Nurse Practitioner      
Neurosurgery Note    No acute events over the weekend.   Pt continues to be neurologically intact.   CT from 7/6 reviewed.   Exam and scans are stable.   Continue care per NIS and intensivist team.  Please call NSGY if needed. Discussed w LILY Campbell  
Neurosurgery Progress Note  Brii Wilcox PA-C          Admit Date: 2024   LOS: 3 days        Daily Progress Note: 2024      Subjective:     Ms. Stroud is a pleasant 65yo female who presented with a HA and nausea. She was found to have a SAH from an ACOM aneurysm. She underwent angiogram and artery coiling on 7/3/24. She tolerated the procedure well.  Today, patient is resting comfortably in bed. She has no complaints. HA is mild. She was able to ambulate with PT without much issue. She denies numbness, tingling, nausea, vomiting, and dyspnea.   Family is at bedside.    Objective:     Vital signs  Temp (24hrs), Av.1 °F (37.3 °C), Min:98.6 °F (37 °C), Max:99.8 °F (37.7 °C)   701 - 1900  In: 626.8 [I.V.:593.2]  Out: -   1901 -  0700  In: 3780.3 [P.O.:520; I.V.:3260.3]  Out: 2700 [Urine:2700]    /65   Pulse 74   Temp 99 °F (37.2 °C)   Resp 13   Ht 1.575 m (5' 2\")   Wt 65.2 kg (143 lb 11.8 oz)   SpO2 96%   BMI 26.29 kg/m²          Physical Exam:  Gen: No acute distress.   Neuro: A&Ox3. Follows commands. Speech clear. Affect normal.  PERRL. Face symmetric.   ANN spontaneously and equally. Strength 5/5 in UE and LE BL.  Sensation intact.  Negative drift.  Gait deferred.    24 hour results:    Recent Results (from the past 24 hour(s))   Basic Metabolic Panel    Collection Time: 24  3:49 AM   Result Value Ref Range    Sodium 139 136 - 145 mmol/L    Potassium 3.7 3.5 - 5.1 mmol/L    Chloride 109 (H) 97 - 108 mmol/L    CO2 25 21 - 32 mmol/L    Anion Gap 5 5 - 15 mmol/L    Glucose 103 (H) 65 - 100 mg/dL    BUN 7 6 - 20 MG/DL    Creatinine 0.49 (L) 0.55 - 1.02 MG/DL    BUN/Creatinine Ratio 14 12 - 20      Est, Glom Filt Rate >90 >60 ml/min/1.73m2    Calcium 8.3 (L) 8.5 - 10.1 MG/DL   CBC    Collection Time: 24  3:49 AM   Result Value Ref Range    WBC 8.1 3.6 - 11.0 K/uL    RBC 3.42 (L) 3.80 - 5.20 M/uL    Hemoglobin 11.2 (L) 11.5 - 16.0 g/dL    Hematocrit 33.7 (L) 35.0 
Occupational Therapy:     Chart reviewed in prep for OT tx session. Pt received semi-supine, with lights off, and reporting increased HA. Educated on role of acute OT and offered participation in OOB ADLs/functional mobility, however pt declined. Offered bed level ADLs/repositioning in prep for eating lunch, however pt continued to politely decline. Will defer and follow as able and medically appropriate.     Thank you,   Marylin Alfonso, OTD, OTR/L    
Occupational Therapy:     Chart reviewed in prep for OT tx session. Two attempts made to see pt this morning, however on first attempt ECHO at bedside/in process. On second attempt phlebotomist at bedside. Will make third attempt later this AM as able and appropriate.     Thank you,   Marylin Alfonso, OTD, OTR/L    
Occupational Therapy:     Orders received and chart reviewed. Noted, pt currently DONOVAN to angio. Will hold and follow up this PM as able and medically appropriate.     Thank you,   Marylin Alfonso, OTD, OTR/L    
Patient negative 255 on I's and O's currently. Patient mildly hyperchloremic on BMP.  Changed IV fluids from NS to LR and increased rate to 100 ml/hr to maintain euvolemia. Informed RN to check NICOM for fluid responsive this evening. Discussed with Intensivist, Dr. Stokes, and RN.    Violeta Alford, [facility]  Neurocritical care NP/Neurointerventional Surgery   
Patient verbalizes understanding of discharge instructions    Stroke Education provided to patient and the following topics were discussed    1. Patients personal risk factors for stroke are none    2. Warning signs of Stroke:        * Sudden numbness or weakness of the face, arm or leg, especially on one side of          The body            * Sudden confusion, trouble speaking or understanding        * Sudden trouble seeing in one or both eyes        * Sudden trouble walking, dizziness, loss of balance or coordination        * Sudden severe headache with no known cause      3. Importance of activation Emergency Medical Services ( 9-1-1 ) immediately if experience any warning signs of stroke.    4. Be sure and schedule a follow-up appointment with your primary care doctor or any specialists as instructed.     5. You must take medicine every day to treat your risk factors for stroke.  Be sure to take your medicines exactly as your doctor tells you: no more, no less.  Know what your medicines are for , what they do.  Anti-thrombotics /anticoagulants can help prevent strokes.  You are taking the following medicine(s)  Nimotop     6.  Smoking and second-hand smoke greatly increase your risk of stroke, cardiovascular disease and death. Smoking history never    7. Information provided was [facility] Stroke Education Binder, Stroke Handouts, or Verbal Education    8. Documentation of teaching completed in Patient Education Activity and on Care Plan with teaching response noted?  yes    
Physical Therapy Note  07/03/2024    Order acknowledged and chart reviewed in prep for PT eval; attempted to see however currently off the floor in angio. Will follow back up later this PM as medically appropriate.    Addendum @ 1315: Returned for second attempt; pt remains on flat bed rest until 1415. Will hold and continue to follow up as medically appropriate.    Thank you,  Nichole Turcios, PT, DPT    
Physical Therapy Note  07/08/2024    Chart reviewed in prep for PT tx session. Attempted to see however currently resting with c/o ongoing headaches; requesting medications prior to ambulating (not due until 1530) and to defer OOB at this time. Spoke with RN who reports he will assist in ambulating with pt later this PM. Will defer per request and continue to follow.    Thank you,  Nichole Turcios, PT, DPT  
Rounded on [facility] patients and provided Anointing of the Sick at request of patient/family.  
SVI change 16%  
Shift Summary    0815: TCD in progress    0920:Transported to Angio     1300: Returned from Angio      No acute events during shift      gtt   mL/hr  Cardene 2.5 mg/hr  
Speech Therapy Contact Note    Order received and chart reviewed. Case discussed with RN. Patient NPO then off the floor for cerebral angiogram this morning during two attempts at initial SLP evaluation. SLP will follow-up as able/appropriate.     Ju Feliciano, CCC-SLP    
TRANSFER - OUT REPORT:    Verbal report given to Vinicius RN on Gaby Stroud being transferred to ICU for routine progression of patient care       Report consisted of patient's Situation, Background, Assessment and   Recommendations(SBAR).     Information from the following report(s) Nurse Handoff Report, Surgery Report, Cardiac Rhythm NSR, and Neuro Assessment was reviewed with the receiving nurse.    Opportunity for questions and clarification was provided.      Patient transported with:   Monitor  Patient-specific medications from Pharmacy  Registered Nurse  CRNA    
138/65      Pulse: 80 79 85 74   Resp: 14 13 10 13   Temp: 99.3 °F (37.4 °C)      TempSrc: Oral      SpO2: 94% 94% 93% 96%   Weight:       Height:            Physical Exam:  General:Pleasant elderly female resting in bed   Respiratory: Unlabored respirations  Skin: Warm, dry, appropriate for ethnicity. Right groin site clean, dry, and intact. No hematoma or bleeding. Scant bruising. Non-tender to palpation    Neurologic Exam:  MS: AAOx4, following commands   Speech: Fluent. Naming and repetition intact  CN: EOMI, VFF, Subtle right facial asymmetry, tongue midline   Motor: Normal bulk and tone. No pronator drift. Strength 5/5 throughout   Sensation: Intact to light touch throughout   Coordination: FNF+ HTS intact  Gait: Deferred    NIHSS 0       Labs:  Lab Results   Component Value Date/Time    WBC 8.1 2024 03:49 AM    HGB 11.2 2024 03:49 AM    HCT 33.7 2024 03:49 AM     2024 03:49 AM    MCV 98.5 2024 03:49 AM     Lab Results   Component Value Date/Time     2024 03:49 AM    K 3.7 2024 03:49 AM     2024 03:49 AM    CO2 25 2024 03:49 AM    BUN 7 2024 03:49 AM     Imagin/2/24 CT Head: Subarachnoid hemorrhage primarily in the anterior  interhemispheric fissure and bilateral sylvian fissures.    24 CT Angio Head and Neck: Small aneurysm arising from the proximal cavernous ICA on the left measures less than 2 mm in size. Minimal subarachnoid hemorrhage.    7/3/24 CT Head: Previously seen diffuse subarachnoid hemorrhage is not significantly changed. Trace hemorrhage within the occipital horns of the lateral ventricles is stable. There is no hydrocephalus    7/3/24 Diagnostic Cerebral Angiogram w/Embolization   Small, irregular, 2.5 mm x 2 mm anterior communicating artery complex aneurysm arising from the A1-A2 junction of the left BILL identified     24 CT Head: There are evolutionary changes involving the diffuse subarachnoid 
lovenox  Care Plan discussed with: patient, nurse   Anticipated Disposition: Home with home health tomorrow.  Her children will take turns to come stay with her.     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 *            Review of Systems:   Pertinent items are noted in HPI.         Vital Signs:    Last 24hrs VS reviewed since prior progress note. Most recent are:  /81   Pulse 82   Temp 97.6 °F (36.4 °C) (Oral)   Resp 19   Ht 1.575 m (5' 2.01\")   Wt 60.9 kg (134 lb 4.2 oz)   SpO2 98%   BMI 24.55 kg/m²        Intake/Output Summary (Last 24 hours) at 7/15/2024 1607  Last data filed at 7/14/2024 1812  Gross per 24 hour   Intake 240 ml   Output --   Net 240 ml          Physical Examination:     I had a face to face encounter with this patient and independently examined them on 7/15/2024 as outlined below:          General : alert x 3, awake, no acute distress,   HEENT: PEERL, EOMI, moist mucus membrane  Neck: supple, no JVD, no meningeal signs  Chest: Clear to auscultation bilaterally   CVS: S1 S2 heard, Capillary refill less than 2 seconds  Abd: soft/ non tender, non distended, BS physiological,   Ext: no clubbing, no cyanosis, no edema, brisk 2+ DP pulses  Neuro/Psych: pleasant mood and affect, CN 2-12 grossly intact, sensory grossly within normal limit  Skin: warm            Data Review:    Review and/or order of clinical lab test  Review and/or order of tests in the radiology section of CPT  Review and/or order of tests in the medicine section of CPT    I have independently reviewed and interpreted patient's lab and all other diagnostic data    Notes reviewed from all clinical/nonclinical/nursing services involved in patient's clinical care. Care coordination discussions were held with appropriate clinical/nonclinical/ nursing providers based on care 
vision changes, nausea, vomiting, chest pain, abdominal pain, dizziness or shortness of breath.     Objective:   Vital signs  Temp (24hrs), Av.8 °F (37.1 °C), Min:98.2 °F (36.8 °C), Max:100.5 °F (38.1 °C)   No intake/output data recorded.   1901 -  07  In: 2941.5 [P.O.:2000; I.V.:941.5]  Out: 900 [Urine:900]  BP (!) 89/70   Pulse (!) 101   Temp 98.5 °F (36.9 °C) (Oral)   Resp 20   Ht 1.575 m (5' 2.01\")   Wt 59.7 kg (131 lb 9.8 oz)   SpO2 96%   BMI 24.07 kg/m²        Vitals:    24 0500 24 0600 24 0700 24 0800   BP:  (!) 145/74  (!) 89/70   Pulse: 75 85 83 (!) 101   Resp:    Temp:    98.5 °F (36.9 °C)   TempSrc:    Oral   SpO2: 97% 94% 95% 96%   Weight:       Height:          Physical Exam:  General: NAD, calm, appropriate  Respiratory: Unlabored respirations, no shortness of breath or increased work of breathing noted during exam.    Neurologic Exam:  Mental Status:               Alert and oriented x 4.  Appropriate affect, mood and behavior.        Speech/Language:                    Clear, Normal fluency, repetition, comprehension and naming. Follows commands.      Cranial Nerves:             Pupils 3 mm, equal, round and briskly reactive to light.  Visual fields full to confrontation.  Extraocular movements intact.     Facial sensation intact.  Full facial strength, no asymmetry.   Hearing grossly intact.  Tongue protrudes to midline, palate elevates symmetrically.   Shoulder shrug 5/5 bilaterally.                                         Motor:                            No pronator drift. 5/5 strength in all extremities proximally and distally.                                 Bulk and tone normal.   No involuntary movements.     Sensation:                     Sensation intact throughout to light touch.     Coordination:   FTN and HTS intact with no ataxia present.     Gait:       Deferred    NIHSS 0     Labs:  Lab Results   Component Value Date/Time    WBC 
°C)   07/04 0701 - 07/04 1900  In: 199 [I.V.:199]  Out: -   07/02 1901 - 07/04 0700  In: 3155.2 [P.O.:600; I.V.:2555.2]  Out: 1000 [Urine:1000]  BP (!) 144/58   Pulse (!) 108   Temp 98.2 °F (36.8 °C) (Oral)   Resp 13   Ht 1.575 m (5' 2\")   Wt 61.7 kg (136 lb 0.4 oz)   SpO2 98%   BMI 24.88 kg/m²        Vitals:    07/04/24 0630 07/04/24 0645 07/04/24 0800 07/04/24 0900   BP:       Pulse: 74 72 70 (!) 108   Resp: 13 11 12 13   Temp:       TempSrc:       SpO2: 98% 98% 97% 98%   Weight:       Height:            Physical Exam:  General: NAD, appears stated age   CV: RRR, S1, S2 present, no murmur, click, rub, or gallop  Respiratory: Unlabored respirations, lungs CTA anteriorly   Extremities: no cyanosis or edema, distal foot pulses palpable  Skin: Warm, dry, appropriate for ethnicity. Right groin site clean, dry, and intact. No hematoma or bleeding. Scant bruising. Non-tender to palpation    Neurologic Exam:  Mental Status:    Alert and oriented x 4.  Appropriate affect, mood and behavior.       Speech and Language:      Clear, Normal fluency, repetition, comprehension and naming. Follows commands.     Cranial Nerves:     PERRL.  Visual fields full to confrontation.  Extraocular movements intact.    Facial sensation intact.  Full facial strength, no asymmetry.  No dysarthria. Tongue protrudes to midline, palate elevates symmetrically.     Shoulder shrug 5/5 bilaterally.    Motor:      No pronator drift. 5/5 power in all extremities proximally and distally.  Bulk and tone normal.   No involuntary movements.    Sensation:      Sensation intact throughout to light touch. No neglect.   Parietal function intact.    Reflexes:      Deferred.    Coordination:  FTN, HTS intact bilaterally.     Gait:   Deferred.      Labs:  Lab Results   Component Value Date/Time    WBC 8.1 07/04/2024 08:32 AM    HGB 11.8 07/04/2024 08:32 AM    HCT 35.0 07/04/2024 08:32 AM     07/04/2024 08:32 AM    MCV 95.9 07/04/2024 08:32 AM 
Status/Airway:  Room air                           Functional Oral Intake Scale (FOIS): 7--Total oral diet with no restrictions    After treatment:   Patient left in no apparent distress in bed, Call bell left within reach, Nursing notified, and Caregiver present    COMMUNICATION/EDUCATION:   Patient was educated regarding her functional swallowing and integrated language function as this relates to her diagnosis of SAH.  She demonstrated Excellent understanding as evidenced by Verbalizing understanding.    The patient's plan of care including recommendations, planned interventions, and recommended diet changes were discussed with: Occupational therapist and Registered nurse    Patient/family have participated as able in goal setting and plan of care and Patient/family agree to work toward stated goals and plan of care    Thank you,  Xi De León SLP    SLP Individual Minutes  Time In: 0900  Time Out: 0940  Minutes: 40

## 2024-07-16 NOTE — PLAN OF CARE
Problem: Discharge Planning  Goal: Discharge to home or other facility with appropriate resources  7/16/2024 1047 by Thi Gil RN  Outcome: Progressing  Flowsheets (Taken 7/16/2024 0800)  Discharge to home or other facility with appropriate resources:   Identify barriers to discharge with patient and caregiver   Arrange for needed discharge resources and transportation as appropriate   Identify discharge learning needs (meds, wound care, etc)  7/15/2024 2246 by Kyle Carr RN  Outcome: Progressing  Flowsheets (Taken 7/15/2024 2000)  Discharge to home or other facility with appropriate resources: Identify barriers to discharge with patient and caregiver     Problem: Pain  Goal: Verbalizes/displays adequate comfort level or baseline comfort level  7/16/2024 1047 by Thi Gil RN  Outcome: Progressing  7/15/2024 2246 by Kyle Carr RN  Outcome: Progressing     Problem: Safety - Adult  Goal: Free from fall injury  7/16/2024 1047 by Thi Gil RN  Outcome: Progressing  Flowsheets (Taken 7/16/2024 0800)  Free From Fall Injury: Instruct family/caregiver on patient safety  7/15/2024 2246 by Kyle Carr RN  Outcome: Progressing  Flowsheets (Taken 7/15/2024 2000)  Free From Fall Injury: Instruct family/caregiver on patient safety     Problem: Skin/Tissue Integrity  Goal: Absence of new skin breakdown  Description: 1.  Monitor for areas of redness and/or skin breakdown  2.  Assess vascular access sites hourly  3.  Every 4-6 hours minimum:  Change oxygen saturation probe site  4.  Every 4-6 hours:  If on nasal continuous positive airway pressure, respiratory therapy assess nares and determine need for appliance change or resting period.  7/16/2024 1047 by Thi Gil RN  Outcome: Progressing  7/15/2024 2246 by Kyle Carr RN  Outcome: Progressing     Problem: Nutrition Deficit:  Goal: Optimize nutritional status  7/16/2024 1047 by Thi Gil RN  Outcome: 
  Problem: Discharge Planning  Goal: Discharge to home or other facility with appropriate resources  7/16/2024 1337 by Thi Gil RN  Outcome: Completed  7/16/2024 1047 by Thi Gil RN  Outcome: Progressing  Flowsheets (Taken 7/16/2024 0800)  Discharge to home or other facility with appropriate resources:   Identify barriers to discharge with patient and caregiver   Arrange for needed discharge resources and transportation as appropriate   Identify discharge learning needs (meds, wound care, etc)     Problem: Pain  Goal: Verbalizes/displays adequate comfort level or baseline comfort level  7/16/2024 1337 by Thi Gil RN  Outcome: Completed  7/16/2024 1047 by Thi Gil RN  Outcome: Progressing     Problem: Safety - Adult  Goal: Free from fall injury  7/16/2024 1337 by Thi Gil RN  Outcome: Completed  7/16/2024 1047 by Thi Gil RN  Outcome: Progressing  Flowsheets (Taken 7/16/2024 0800)  Free From Fall Injury: Instruct family/caregiver on patient safety     Problem: Skin/Tissue Integrity  Goal: Absence of new skin breakdown  Description: 1.  Monitor for areas of redness and/or skin breakdown  2.  Assess vascular access sites hourly  3.  Every 4-6 hours minimum:  Change oxygen saturation probe site  4.  Every 4-6 hours:  If on nasal continuous positive airway pressure, respiratory therapy assess nares and determine need for appliance change or resting period.  7/16/2024 1337 by Thi Gil RN  Outcome: Completed  7/16/2024 1047 by Thi Gil RN  Outcome: Progressing     Problem: Nutrition Deficit:  Goal: Optimize nutritional status  7/16/2024 1337 by Thi Gil RN  Outcome: Completed  7/16/2024 1047 by Thi Gil RN  Outcome: Progressing     
  Problem: Discharge Planning  Goal: Discharge to home or other facility with appropriate resources  Outcome: Progressing     Problem: Pain  Goal: Verbalizes/displays adequate comfort level or baseline comfort level  Outcome: Progressing     Problem: Safety - Adult  Goal: Free from fall injury  Outcome: Progressing  Flowsheets (Taken 7/13/2024 2000)  Free From Fall Injury: Instruct family/caregiver on patient safety     Problem: Skin/Tissue Integrity  Goal: Absence of new skin breakdown  Description: 1.  Monitor for areas of redness and/or skin breakdown  2.  Assess vascular access sites hourly  3.  Every 4-6 hours minimum:  Change oxygen saturation probe site  4.  Every 4-6 hours:  If on nasal continuous positive airway pressure, respiratory therapy assess nares and determine need for appliance change or resting period.  Outcome: Progressing     Problem: Nutrition Deficit:  Goal: Optimize nutritional status  Outcome: Progressing     
  Problem: Discharge Planning  Goal: Discharge to home or other facility with appropriate resources  Outcome: Progressing  Flowsheets  Taken 7/15/2024 0800 by Thi Gil RN  Discharge to home or other facility with appropriate resources:   Identify barriers to discharge with patient and caregiver   Arrange for needed discharge resources and transportation as appropriate   Identify discharge learning needs (meds, wound care, etc)  Taken 7/14/2024 2000 by Sadia Duarte RN  Discharge to home or other facility with appropriate resources: Identify barriers to discharge with patient and caregiver     Problem: Pain  Goal: Verbalizes/displays adequate comfort level or baseline comfort level  Outcome: Progressing     Problem: Safety - Adult  Goal: Free from fall injury  Outcome: Progressing  Flowsheets (Taken 7/15/2024 0800)  Free From Fall Injury: Instruct family/caregiver on patient safety     Problem: Skin/Tissue Integrity  Goal: Absence of new skin breakdown  Description: 1.  Monitor for areas of redness and/or skin breakdown  2.  Assess vascular access sites hourly  3.  Every 4-6 hours minimum:  Change oxygen saturation probe site  4.  Every 4-6 hours:  If on nasal continuous positive airway pressure, respiratory therapy assess nares and determine need for appliance change or resting period.  Outcome: Progressing     Problem: Nutrition Deficit:  Goal: Optimize nutritional status  Outcome: Progressing     
  Problem: Discharge Planning  Goal: Discharge to home or other facility with appropriate resources  Outcome: Progressing  Flowsheets (Taken 7/14/2024 0800)  Discharge to home or other facility with appropriate resources:   Identify barriers to discharge with patient and caregiver   Arrange for needed discharge resources and transportation as appropriate   Identify discharge learning needs (meds, wound care, etc)   Arrange for interpreters to assist at discharge as needed     Problem: Pain  Goal: Verbalizes/displays adequate comfort level or baseline comfort level  Outcome: Progressing     Problem: Safety - Adult  Goal: Free from fall injury  Outcome: Progressing  Flowsheets (Taken 7/14/2024 1059)  Free From Fall Injury: Instruct family/caregiver on patient safety     Problem: Skin/Tissue Integrity  Goal: Absence of new skin breakdown  Description: 1.  Monitor for areas of redness and/or skin breakdown  2.  Assess vascular access sites hourly  3.  Every 4-6 hours minimum:  Change oxygen saturation probe site  4.  Every 4-6 hours:  If on nasal continuous positive airway pressure, respiratory therapy assess nares and determine need for appliance change or resting period.  Outcome: Progressing     Problem: Nutrition Deficit:  Goal: Optimize nutritional status  Outcome: Progressing     
  Problem: Occupational Therapy - Adult  Goal: By Discharge: Performs self-care activities at highest level of function for planned discharge setting.  See evaluation for individualized goals.  Description: Occupational Therapy Goals  Initiated: 7/4/2024   1.  Patient will perform grooming with mod I standing at the sink within 7 day(s).  2.  Patient will perform bathing with mod I within 7 day(s).  3.  Patient will perform upper body dressing with independence within 7 days.  4.  Patient will perform lower body dressing with independence within 7 day(s).  5.  Patient will perform toilet transfers with modified independence within 7 day(s).  6.  Patient will perform all aspects of toileting with modified independence within 7 day(s).  7.  Patient will complete simulated IALD activity (ie. Cooking task, financial management) with independence within 7 days.       FUNCTIONAL STATUS PRIOR TO ADMISSION:     , ADL Assistance: Independent,  ,  ,  ,  ,  , Homemaking Assistance: Independent, Ambulation Assistance: Independent, Transfer Assistance: Independent, Active : Yes     HOME SUPPORT: She is independent at baseline and lives alone.      Outcome: Progressing   OCCUPATIONAL THERAPY TREATMENT    Patient: Gaby Stroud (64 y.o. female)  Date: 7/8/2024  Primary Diagnosis: SAH (subarachnoid hemorrhage) (HCC) [I60.9]  Subarachnoid hemorrhage due to ruptured aneurysm (HCC) [I60.8]  Hypertensive emergency [I16.1]       Precautions:                  Chart, occupational therapy assessment, plan of care, and goals were reviewed.    ASSESSMENT  Patient continues to benefit from skilled OT services and is progressing towards goals. Pt's performance of ADL/IADL tasks continues to be limited by impaired standing balance, activity tolerance, mildly impaired coordination (R>L), and ongoing HA. Pt received seated in bedside chair and agreeable to participation in therapy session. She reported improving, though persistent, HA 
  Problem: Occupational Therapy - Adult  Goal: By Discharge: Performs self-care activities at highest level of function for planned discharge setting.  See evaluation for individualized goals.  Description: Occupational Therapy Goals  Initiated: 7/4/2024   1.  Patient will perform grooming with mod I standing at the sink within 7 day(s).  2.  Patient will perform bathing with mod I within 7 day(s).  3.  Patient will perform upper body dressing with independence within 7 days.  4.  Patient will perform lower body dressing with independence within 7 day(s).  5.  Patient will perform toilet transfers with modified independence within 7 day(s).  6.  Patient will perform all aspects of toileting with modified independence within 7 day(s).  7.  Patient will complete simulated IALD activity (ie. Cooking task, financial management) with independence within 7 days.       FUNCTIONAL STATUS PRIOR TO ADMISSION:     , ADL Assistance: Independent,  ,  ,  ,  ,  , Homemaking Assistance: Independent, Ambulation Assistance: Independent, Transfer Assistance: Independent, Active : Yes     HOME SUPPORT: She is independent at baseline and lives alone.      Outcome: Progressing  OCCUPATIONAL THERAPY TREATMENT  Patient: Gaby Stroud (64 y.o. female)  Date: 7/9/2024  Primary Diagnosis: SAH (subarachnoid hemorrhage) ([facility]) [I60.9]  Subarachnoid hemorrhage due to ruptured aneurysm (HCC) [I60.8]  Hypertensive emergency [I16.1]       Precautions:                  Chart, occupational therapy assessment, plan of care, and goals were reviewed.    ASSESSMENT  Patient continues to benefit from skilled OT services and is progressing towards goals. Pt's performance of ADL/IADL tasks continues to be limited by mild deficits in balance, RUE coordination, and activity tolerance. She completed bed mobility, sit<>stand transfers, standing UB/LB bathing and dressing, full standing grooming routine, and functional mobility with mod I to supervision for 
  Problem: Occupational Therapy - Adult  Goal: By Discharge: Performs self-care activities at highest level of function for planned discharge setting.  See evaluation for individualized goals.  Description: Occupational Therapy Goals  Initiated: 7/4/2024, goals reviewed and continued at weekly re-assessment 7/11/2024:   1.  Patient will perform grooming with mod I standing at the sink within 7 day(s).  2.  Patient will perform bathing with mod I within 7 day(s).  3.  Patient will perform upper body dressing with independence within 7 days.  4.  Patient will perform lower body dressing with independence within 7 day(s).  5.  Patient will perform toilet transfers with modified independence within 7 day(s).  6.  Patient will perform all aspects of toileting with modified independence within 7 day(s).  7.  Patient will complete simulated IALD activity (ie. Cooking task, financial management) with independence within 7 days.       FUNCTIONAL STATUS PRIOR TO ADMISSION:     , ADL Assistance: Independent,  ,  ,  ,  ,  , Homemaking Assistance: Independent, Ambulation Assistance: Independent, Transfer Assistance: Independent, Active : Yes     HOME SUPPORT: She is independent at baseline and lives alone.      Outcome: Progressing   OCCUPATIONAL THERAPY TREATMENT: WEEKLY REASSESSMENT    Patient: Gaby Stroud (64 y.o. female)  Date: 7/11/2024  Primary Diagnosis: SAH (subarachnoid hemorrhage) ([facility]) [I60.9]  Subarachnoid hemorrhage due to ruptured aneurysm (HCC) [I60.8]  Hypertensive emergency [I16.1]       Precautions:  (SBP < 180)                Chart, occupational therapy assessment, plan of care, and goals were reviewed.    ASSESSMENT  Patient continues to benefit from skilled OT services and is progressing towards goals. Pt's performance of ADL/IADL tasks continues to be limited by impaired balance, activity tolerance, coordination/FMC (> deficit in RUE), speech, and cognition (insight, safety awareness, memory). 
  Problem: Physical Therapy - Adult  Goal: By Discharge: Performs mobility at highest level of function for planned discharge setting.  See evaluation for individualized goals.  Description: FUNCTIONAL STATUS PRIOR TO ADMISSION: Patient was independent and active without use of DME.    HOME SUPPORT PRIOR TO ADMISSION: The patient lived alone.     Physical Therapy Goals  Goals reviewed & remain appropriate as below at weekly re-assessment 7/11/2024    Initiated 7/4/2024  1.  Patient will move from supine to sit and sit to supine, scoot up and down, and roll side to side in bed with independence within 7 day(s).    2.  Patient will perform sit to stand with independence within 7 day(s).  3.  Patient will transfer from bed to chair and chair to bed with independence  within 7 day(s).  4.  Patient will ambulate with independence for 150 feet  within 7 day(s).   5.  Patient will ascend/descend 4 stairs with handrail(s), per home set-up, with modified independence within 7 day(s).    Outcome: Progressing     PHYSICAL THERAPY TREATMENT    Patient: Gaby Stroud (64 y.o. female)  Date: 7/15/2024  Diagnosis: SAH (subarachnoid hemorrhage) ([facility]) [I60.9]  Subarachnoid hemorrhage due to ruptured aneurysm ([facility]) [I60.8]  Hypertensive emergency [I16.1] Subarachnoid hemorrhage due to ruptured aneurysm ([facility])      Precautions:  (SBP < 180)                      ASSESSMENT:  Patient continues to benefit from skilled PT services and is progressing towards goals however remains most limited by mild R weakness, impaired R coordination, impaired balance, impaired gait, and decr tolerance to activity leading to increased falls risk and dependency from baseline level.    Received supine in bed, cleared for mobility by Rn. Participated in progressive gait training on unit with emphasis on integrating head movements, directional changes, dual tasks, obstacle avoidance, and managing in small spaces. Continues to demo slight weaving drift with R 
  Problem: Physical Therapy - Adult  Goal: By Discharge: Performs mobility at highest level of function for planned discharge setting.  See evaluation for individualized goals.  Description: FUNCTIONAL STATUS PRIOR TO ADMISSION: Patient was independent and active without use of DME.    HOME SUPPORT PRIOR TO ADMISSION: The patient lived alone.     Physical Therapy Goals  Goals reviewed & remain appropriate as below at weekly re-assessment 7/11/2024    Initiated 7/4/2024  1.  Patient will move from supine to sit and sit to supine, scoot up and down, and roll side to side in bed with independence within 7 day(s).    2.  Patient will perform sit to stand with independence within 7 day(s).  3.  Patient will transfer from bed to chair and chair to bed with independence  within 7 day(s).  4.  Patient will ambulate with independence for 150 feet  within 7 day(s).   5.  Patient will ascend/descend 4 stairs with handrail(s), per home set-up, with modified independence within 7 day(s).    Outcome: Progressing   PHYSICAL THERAPY TREATMENT    Patient: Gaby Stroud (64 y.o. female)  Date: 7/16/2024  Diagnosis: SAH (subarachnoid hemorrhage) ([facility]) [I60.9]  Subarachnoid hemorrhage due to ruptured aneurysm ([facility]) [I60.8]  Hypertensive emergency [I16.1] Subarachnoid hemorrhage due to ruptured aneurysm (HCC)      Precautions:  (SBP < 180)                      ASSESSMENT:  Patient continues to benefit from skilled PT services and is progressing towards goals however remains most limited by mild R weakness, impaired R coordination, impaired balance, impaired gait, and decr tolerance to activity leading to increased falls risk and dependency from baseline level.     Received supine in bed, cleared for mobility by Rn. Participated in progressive gait training on unit with emphasis on integrating head movements, directional changes, dual tasks, obstacle avoidance, side stepping, and retro amb. Continues to demo very slight weaving drift with 
  Problem: Physical Therapy - Adult  Goal: By Discharge: Performs mobility at highest level of function for planned discharge setting.  See evaluation for individualized goals.  Description: FUNCTIONAL STATUS PRIOR TO ADMISSION: Patient was independent and active without use of DME.    HOME SUPPORT PRIOR TO ADMISSION: The patient lived alone.     Physical Therapy Goals  Initiated 7/4/2024  1.  Patient will move from supine to sit and sit to supine, scoot up and down, and roll side to side in bed with independence within 7 day(s).    2.  Patient will perform sit to stand with independence within 7 day(s).  3.  Patient will transfer from bed to chair and chair to bed with independence  within 7 day(s).  4.  Patient will ambulate with independence for 150 feet  within 7 day(s).   5.  Patient will ascend/descend 4 stairs with handrail(s), per home set-up, with modified independence within 7 day(s).    Outcome: Progressing   PHYSICAL THERAPY TREATMENT    Patient: Gaby Stroud (64 y.o. female)  Date: 7/10/2024  Diagnosis: SAH (subarachnoid hemorrhage) ([facility]) [I60.9]  Subarachnoid hemorrhage due to ruptured aneurysm ([facility]) [I60.8]  Hypertensive emergency [I16.1] Subarachnoid hemorrhage due to ruptured aneurysm ([facility])      Precautions:                        ASSESSMENT:  Patient continues to benefit from skilled PT services and is slowly progressing towards goals however remains most limited by slightly impaired R coordination, impaired balance, impaired gait, decr tolerance to activity, and worsening c/o back pain leading to increased falls risk and dependency from baseline level.    Received pt up in chair cleared for mobility by RN. HR resting 105bpm. Gait training initiated in room to sink to complete functional tasks - required increased assist to stand and stabilize plus incr assist for short distance amb today. Demos decr step clearance and slowed, antalgic pace. Demos fair standing balance at sink initially however 
2000 Received Pt from HERRERA Ness    2015 Pt resting comfortable in bed. Administered tylenol prn w/ scheduled medications for headache. Ambulated to BR w/ stand by assist.     0000 Prn Fioricet for headache     0400 Prn tylenol for headache and Flexeril for muscle spasm     0730 Bedside and Verbal Shift change report given to HERRERA Ness (oncoming nurse) by Kyle Carr RN. Report included the following information SBAR, Kardex, MAR, Recent Results, and Cardiac Rhythm NSR.      Problem: Discharge Planning  Goal: Discharge to home or other facility with appropriate resources  7/15/2024 2246 by Kyle Carr RN  Outcome: Progressing  Flowsheets (Taken 7/15/2024 2000)  Discharge to home or other facility with appropriate resources: Identify barriers to discharge with patient and caregiver  7/15/2024 0922 by Thi Gil RN  Outcome: Progressing  Flowsheets  Taken 7/15/2024 0800 by Thi Gil RN  Discharge to home or other facility with appropriate resources:   Identify barriers to discharge with patient and caregiver   Arrange for needed discharge resources and transportation as appropriate   Identify discharge learning needs (meds, wound care, etc)  Taken 7/14/2024 2000 by Sadia Duarte RN  Discharge to home or other facility with appropriate resources: Identify barriers to discharge with patient and caregiver     Problem: Pain  Goal: Verbalizes/displays adequate comfort level or baseline comfort level  7/15/2024 2246 by Kyle Carr RN  Outcome: Progressing  7/15/2024 0922 by Thi Gil RN  Outcome: Progressing     Problem: Safety - Adult  Goal: Free from fall injury  7/15/2024 2246 by Kyle Carr RN  Outcome: Progressing  Flowsheets (Taken 7/15/2024 2000)  Free From Fall Injury: Instruct family/caregiver on patient safety  7/15/2024 0922 by Thi Gil RN  Outcome: Progressing  Flowsheets (Taken 7/15/2024 0800)  Free From Fall Injury: Instruct family/caregiver on patient 
and OOB ADL tasks with supervision. Pt denied lightheadedness/dizziness or pain with activity and rated her HA 1/10 after session. Dual tasking completed in the hallway to simulate return to everyday activities such as grocery shopping, household management, and community re-entry. One minor LOB noted during this task, however pt independently recovered. Educated pt at length on benefits of supervision from friends/family at d/c - good understanding  noted. Continue to recommend d/c home with OP OT services to address high level neuro deficits/recovery.          PLAN :  Patient continues to benefit from skilled intervention to address the above impairments.  Continue treatment per established plan of care to address goals.      Recommendation for discharge: (in order for the patient to meet his/her long term goals): Outpatient occupational therapy for neuro recovery     Other factors to consider for discharge: high risk for falls    IF patient discharges home will need the following DME: none       SUBJECTIVE:   Patient stated “I think my family and friends will probably come stay with me for a little while.”    OBJECTIVE DATA SUMMARY:   Cognitive/Behavioral Status:  Orientation  Overall Orientation Status: Within Normal Limits  Orientation Level: Oriented X4  Cognition  Overall Cognitive Status: Exceptions  Arousal/Alertness: Appears intact  Following Commands: Follows one step commands consistently;Follows multistep commands with repitition  Attention Span: Appears intact  Problem Solving: Appears intact  Insights: Appears intact  Initiation: Does not require cues  Sequencing: Does not require cues    Functional Mobility and Transfers for ADLs:  Bed Mobility:  Bed Mobility Training  Bed Mobility Training: Yes  Rolling: Modified independent  Supine to Sit: Modified independent  Scooting: Modified independent     Transfers:   Transfer Training  Transfer Training: Yes  Sit to Stand: Supervision  Stand to Sit: 
                                                     Intervention/Education specific to: \"Stroke diagnoses\"    Patient and/or family was verbally and visually educated on the BE FAST acronym for signs/symptoms of CVA and TIA.  All questions answered with patient indicating good  understanding.                                                                                                                                                                                                                                   Pain Ratin/10   Pain Intervention(s):   nursing notified, rest, repositioning, and pain is at a level acceptable to the patient    Activity Tolerance:   Good and requires rest breaks    After treatment:   Patient left in no apparent distress in bed, Call bell within reach, and Caregiver / family present      COMMUNICATION/EDUCATION:   The patient's plan of care was discussed with: registered nurse    Patient Education  Education Given To: Patient  Education Provided: Role of Therapy;Fall Prevention Strategies;Precautions  Education Method: Demonstration;Verbal;Teach Back  Barriers to Learning: None  Education Outcome: Verbalized understanding;Demonstrated understanding      Nichole Turcios PT, DPT  Minutes: 16    
Cognitive Status: WFL    Edema: Noted slight edema in R UE (location: near IV insertion) - Not painful to light touch    Hearing:   Hearing  Hearing: Within functional limits    Vision/Perceptual:          Vision  Vision: Within Functional Limits  Perception  Overall Perceptual Status: WFL    Strength:    Strength: Within functional limits    Tone & Sensation:   Tone: Normal  Sensation: Intact    Coordination:  Coordination: Within functional limits    Range Of Motion:  AROM: Within functional limits  PROM: Within functional limits    Functional Mobility:  Bed Mobility:     Bed Mobility Training  Bed Mobility Training: Yes  Overall Level of Assistance: Supervision  Rolling: Supervision  Supine to Sit: Supervision  Sit to Supine: Supervision  Scooting: Supervision  Transfers:     Transfer Training  Transfer Training: Yes  Overall Level of Assistance: Contact-guard assistance  Sit to Stand: Contact-guard assistance  Stand to Sit: Contact-guard assistance  Toilet Transfer: Contact-guard assistance  Balance:     Balance  Sitting: Intact  Standing: Impaired  Standing - Static: Fair  Standing - Dynamic: Fair  Ambulation/Gait Training:  Limited by arterial line                                                                                                                                                                                                                                   Bhakta Balance Test:    Bhakta Balance Scale  1. Sitting to Standing: Able to stand independently using hands  2. Standing Unsupported: Able to stand safely for 2 minutes  3. Sitting with Back Unsupported but Feet Supported on Floor or on a Stool: Able to sit safely and securely for 2 minutes  4. Standing to Sitting: Controls descent by using hands  5.  Transfers: Able to transfer safely definite need of hands  6. Standing Unsupported with Eyes Closed: Able to stand 10 seconds with supervision  7. Standing Unsupported with Feet Together: Able to 
sway increased  Wheelchair Management  Wheelchair Management: No     Neuro Re-Education:                                                                                                                                                                                                                                         Intervention/Education specific to: \"Stroke diagnoses\"    Patient and/or family was verbally and visually educated on the BE FAST acronym for signs/symptoms of CVA and TIA.  All questions answered with patient indicating good  understanding.                                                                                                                                                                                                                                   Pain Rating:  3/10   Pain Intervention(s):   nursing notified, rest, and pain is at a level acceptable to the patient    Activity Tolerance:   Good    After treatment:   Patient left in no apparent distress sitting up in chair, Call bell within reach, and Caregiver / family present      COMMUNICATION/EDUCATION:   The patient's plan of care was discussed with: registered nurse    Patient Education  Education Given To: Patient;Family  Education Provided: Role of Therapy;Fall Prevention Strategies;Precautions  Education Method: Demonstration;Verbal;Teach Back  Barriers to Learning: None  Education Outcome: Verbalized understanding;Demonstrated understanding      Nichole Turcios, PT, DPT  Minutes: 20    
treatment session. Per CMS Medicare statements and APTA guidelines I certify that the following was true:  1. I was present and directly observed the entire session.  2. I made all skilled judgments and clinical decisions regarding care.  3. I am the practitioner responsible for assessment, treatment, and documentation.   
Pronation: Full  Subtotal: 18    Volitional Movement Mixing Synergies  Hand to Lumbar Spine: Full  Shoulder Flexion (0-90 degrees): Full  Pronation-Supination: Full  Subtotal: 6    Volitional Movement With Little or No Synergy  Shoulder Abduction (0-90 degrees): Full  Shoulder Flexion ( degrees): Full  Pronation/Supination: Full  Subtotal: 6    Normal Reflex Activity  Biceps, Triceps, Finger Flexors: Full  Subtotal: 2    Upper Extremity Total   Upper Extremity Total: 36    Wrist  Stability at 15 Degree Dorsiflexion: Full  Repeated Dorsiflexion/ Volar Flexion: Full  Stability at 15 Degree Dorsiflexion: Full  Repeated Dorsiflexion/ Volar Flexion: Full  Circumduction: Full  Wrist Total: 10    Hand  Mass Flexion: Full  Mass Extension: Full  Grasp A: Full  Grasp B: Full  Grasp C: Full  Grasp D: Full  Grasp E: Full  Hand Total: 14    Coordination/Speed  Tremor: None  Dysmetria: None  Time: <1s  Coordination/Speed Total: 6    Total A-D  Total A-D (Motor Function): 66         This is a reliable/valid measure of arm function after a neurological event. It has established value to characterize functional status and for measuring spontaneous and therapy-induced recovery; tests proximal and distal motor functions. Fugl-Garnett Assessment - UE scores recorded between five and 30 days post neurologic event can be used to predict UE recovery at six months post neurologic event.  Severe = 0-21 points   Moderately Severe = 22-33 points   Moderate = 34-47 points   Mild = 48-66 points  WESLEY Adams, ANNA Martinez, ESA Preston, BALBIR Marley, & ALBER Alegria (1992). Measurement of motor recovery after stroke: Outcome assessment and sample size requirements. Stroke, 23, pp. 5011-4269.   --------------------------------------------------------------------------------------------------------------------------------------------------------------------  MCID:  Stroke:   (Fco et al, 2001; n = 171; mean age 70 (11) years; assessed within 17 
      After treatment:   Patient left in no apparent distress sitting up in chair, Call bell within reach, Bed/ chair alarm activated, Caregiver / family present, and RN aware    COMMUNICATION/EDUCATION:   The patient's plan of care was discussed with: occupational therapist and registered nurse    Patient Education  Education Given To: Patient  Education Provided: Role of Therapy;Fall Prevention Strategies;Precautions;Plan of Care  Education Method: Verbal  Barriers to Learning: None  Education Outcome: Verbalized understanding    Thank you for this referral.  Kemi Randall, PT  Minutes: 28

## 2024-08-02 ENCOUNTER — TELEPHONE (OUTPATIENT)
Age: 64
End: 2024-08-02

## 2024-08-02 DIAGNOSIS — I67.1 CEREBRAL ANEURYSM: Primary | ICD-10-CM

## 2024-08-02 NOTE — TELEPHONE ENCOUNTER
Sent message to NP on call via [employer]:    John Henderson,    Patient: Gaby Stroud  MRN: [id number]  Phone: [phone]    This [facility] patient will not be seen in clinic until 8/14/24 just called with some questions about a change in her headaches from dull, to a bit more intense.    Could you please give her a call? She actually mentioned you by name and I told her that you were our on call NP today.    Thanks, Maryjo  Read 8/2/2024 10:00 AM

## 2024-08-02 NOTE — PROGRESS NOTES
Patient presents with SAH in bilateral sylvian fissures d/t rupture of A-comm aneurysm. S/p coil embolization of ACOM aneurysm on 7/3/2024 by Dr. Stokes.    She needs follow up imaging with MRA Head WO Contrast. Please arrange with patient, imaging needs to be obtained prior to her follow up appointment in clinic on 8/14/24.    Beatriz Duran NP   Neurointerventional Surgery Nurse Practitioner

## 2024-08-09 ENCOUNTER — HOSPITAL ENCOUNTER (OUTPATIENT)
Facility: HOSPITAL | Age: 64
Discharge: HOME | End: 2024-08-09
Payer: COMMERCIAL

## 2024-08-09 DIAGNOSIS — I67.1 CEREBRAL ANEURYSM: ICD-10-CM

## 2024-08-09 PROCEDURE — 70544 MR ANGIOGRAPHY HEAD W/O DYE: CPT

## 2024-08-14 ENCOUNTER — OFFICE VISIT (OUTPATIENT)
Age: 64
End: 2024-08-14
Payer: COMMERCIAL

## 2024-08-14 VITALS
BODY MASS INDEX: 23.74 KG/M2 | SYSTOLIC BLOOD PRESSURE: 124 MMHG | TEMPERATURE: 98.1 F | DIASTOLIC BLOOD PRESSURE: 84 MMHG | HEIGHT: 62 IN | OXYGEN SATURATION: 97 % | WEIGHT: 129 LBS | HEART RATE: 96 BPM

## 2024-08-14 DIAGNOSIS — I60.8 SUBARACHNOID HEMORRHAGE DUE TO RUPTURED ANEURYSM (HCC): Primary | ICD-10-CM

## 2024-08-14 PROCEDURE — 99213 OFFICE O/P EST LOW 20 MIN: CPT | Performed by: RADIOLOGY

## 2024-08-14 NOTE — PROGRESS NOTES
[facility] procedure follow up for SAH d/t ruptured Cerebral aneurysm.  Sister at visit.  Patient reports right groin puncture site healed without difficulty.  Reports continued episodes of vertigo, unsteady gait and a feeling of pressure at the top of her head.  Patient reports she is receiving PT for vertigo.  No new problems reported.

## 2024-08-14 NOTE — PATIENT INSTRUCTIONS
Diagnostic Angiogram in December 2024 at [facility].   Arrival time will be called to you closer to December.   Blood work will be needed at least one week prior to procedure at a [facility]   [facility].  -No eating or drinking after midnight the night prior to Angiogram.  -Take all morning medications with sips of water the morning of the angiogram.  -You must have a  to drive you home upon discharge.  -Recommend you have someone with you for at least 24-48 hours post procedure.  -No metal or glue in hair.    Referral to Neurosurgery.  Referral to Neurology for driving evaluation.

## 2024-08-16 NOTE — PROGRESS NOTES
Neurointerventional Surgery Clinic Note        Patient: Gaby Stroud MRN: [id number]  SSN: [id number]    YOB: 1960  Age: 64 y.o.  Sex: female      Subjective:      Gaby Stroud is a 64 y.o. female who is being seen for follow-up post subarachnoid hemorrhage and endovascular coil embolization of anterior communicating artery complex aneurysm.    Past Medical History:   Diagnosis Date    Psychiatric disorder     depresssion      Past Surgical History:   Procedure Laterality Date    BREAST BIOPSY Right     neg    BREAST BIOPSY Left     X 3; all neg    GYN            Family History   Problem Relation Age of Onset    Breast Cancer Mother 63        X 2    Neuropathy Mother     Heart Disease Father     Cancer Father     Hypertension Father     Headache Son      Social History     Tobacco Use    Smoking status: Former    Smokeless tobacco: Never   Substance Use Topics    Alcohol use: Not Currently      Current Outpatient Medications   Medication Sig Dispense Refill    venlafaxine (EFFEXOR XR) 150 MG extended release capsule Take 1 capsule by mouth daily      butalbital-acetaminophen-caffeine (FIORICET, ESGIC) -40 MG per tablet Take 1 tablet by mouth every 6 hours as needed for Headaches (For severe headaches) (Patient not taking: Reported on 2024) 20 tablet 0    niMODipine (NIMOTOP) 30 MG capsule Take 2 capsules by mouth every 4 hours for 51 doses 102 capsule 0    meclizine (ANTIVERT) 25 MG tablet Take 1 tablet by mouth 3 times daily as needed (Patient not taking: Reported on 2024)       No current facility-administered medications for this visit.        Allergies   Allergen Reactions    Codeine Other (See Comments)     Bad dreams    Sulfa Antibiotics Hives       Review of Systems:  Pertinent items are noted in the History of Present Illness.    Objective:     Vitals:    24 0912   BP: 124/84   Site: Left Upper Arm   Position: Sitting   Pulse: 96   Temp: 98.1 °F (36.7 °C)

## 2024-08-28 ENCOUNTER — TELEPHONE (OUTPATIENT)
Age: 64
End: 2024-08-28

## 2024-08-28 NOTE — TELEPHONE ENCOUNTER
Called and spoke to the Pt. per the provider we do no treat for this indication. needs to follow up with Neurosurgical.   She thanked me for letting her know.

## 2024-08-29 ENCOUNTER — TELEPHONE (OUTPATIENT)
Age: 64
End: 2024-08-29

## 2024-08-29 NOTE — TELEPHONE ENCOUNTER
Patient called about her referrals to Neurology and Neuro Surgery.    Patient states that Dr. Lemos's office did not receive the referral and office notes. Referral will be sent again today.    She also states that [facility] called and told her that they do not see patients for her condition. Patient also states that she doesn't know why she was sent to Neurology.     Please advise.

## 2024-10-02 DIAGNOSIS — I60.8 SUBARACHNOID HEMORRHAGE DUE TO RUPTURED ANEURYSM (HCC): Primary | ICD-10-CM

## 2024-11-01 ENCOUNTER — TELEPHONE (OUTPATIENT)
Age: 64
End: 2024-11-01

## 2024-11-06 ENCOUNTER — TELEPHONE (OUTPATIENT)
Age: 64
End: 2024-11-06

## 2024-11-06 DIAGNOSIS — I60.8 SUBARACHNOID HEMORRHAGE DUE TO RUPTURED ANEURYSM (HCC): Primary | ICD-10-CM

## 2024-11-06 DIAGNOSIS — I60.8 SUBARACHNOID HEMORRHAGE DUE TO RUPTURED ANEURYSM (HCC): ICD-10-CM

## 2024-11-06 NOTE — TELEPHONE ENCOUNTER
Spoke to patient and confirmed Diagnostic Angiogram on 12/10/2024 at [facility].   Arrival time 8:30 am at Patient Registration. Main Hospital Entrance.    Blood work will be needed at least one week prior to procedure at a [facility]   [facility].    Reminders to patient:  -No eating or drinking after midnight the night prior to Angiogram.  -Take all morning medications with sips of water the morning of the angiogram.  -You must have a  to drive you home upon discharge.  -Recommend you have someone with you for at least 24-48 hours post procedure.  -No metal or glue in hair.    Allergies reviewed.  Patient stated understanding.

## 2024-12-06 ENCOUNTER — TELEPHONE (OUTPATIENT)
Age: 64
End: 2024-12-06

## 2024-12-06 NOTE — TELEPHONE ENCOUNTER
Patient called wanting to go over her arrival details for her NIS procedure on 12/10/2024.  I let patient know that I would let Chikis know.    Patient states that she had her labs drawn at her PCP's office this past week. We do not have these results.  I asked the patient to call her PCP's office and have them faxed to us.     Patient expressed understanding.

## 2024-12-06 NOTE — TELEPHONE ENCOUNTER
Spoke to patient to confirm procedure tomorrow.   Arrival time 8:30 am at Patient registration.    Reminder to patient:  -No eating or drinking after midnight the day prior to procedure.   -Take morning medications the morning of procedure with sips of water.   -Do not stop taking Blood Thinners if applicable unless notified by this office.  -You must have a  to drive you home upon discharge.  -Recommend you have someone with you for at least 24-48 hours post procedure.  -No metal or glue in hair.    Allergies reviewed.    Patient stated understanding.

## 2024-12-09 ENCOUNTER — TELEPHONE (OUTPATIENT)
Age: 64
End: 2024-12-09

## 2024-12-10 ENCOUNTER — HOSPITAL ENCOUNTER (OUTPATIENT)
Facility: HOSPITAL | Age: 64
Discharge: HOME | End: 2024-12-10
Attending: RADIOLOGY | Admitting: RADIOLOGY
Payer: COMMERCIAL

## 2024-12-10 VITALS
BODY MASS INDEX: 23.74 KG/M2 | RESPIRATION RATE: 17 BRPM | DIASTOLIC BLOOD PRESSURE: 80 MMHG | WEIGHT: 129 LBS | TEMPERATURE: 97.8 F | HEART RATE: 78 BPM | OXYGEN SATURATION: 96 % | SYSTOLIC BLOOD PRESSURE: 146 MMHG | HEIGHT: 62 IN

## 2024-12-10 DIAGNOSIS — I60.8 SUBARACHNOID HEMORRHAGE DUE TO RUPTURED ANEURYSM (HCC): ICD-10-CM

## 2024-12-10 PROCEDURE — 6370000000 HC RX 637 (ALT 250 FOR IP): Performed by: RADIOLOGY

## 2024-12-10 PROCEDURE — 6360000002 HC RX W HCPCS: Performed by: RADIOLOGY

## 2024-12-10 PROCEDURE — APPNB180 APP NON BILLABLE TIME > 60 MINS: Performed by: NURSE PRACTITIONER

## 2024-12-10 PROCEDURE — 2580000003 HC RX 258: Performed by: RADIOLOGY

## 2024-12-10 PROCEDURE — 6360000004 HC RX CONTRAST MEDICATION: Performed by: RADIOLOGY

## 2024-12-10 PROCEDURE — 2709999900 IR ARCH UNI CAR CERV CEREBRAL

## 2024-12-10 PROCEDURE — 2500000003 HC RX 250 WO HCPCS: Performed by: RADIOLOGY

## 2024-12-10 RX ORDER — SODIUM BICARBONATE 42 MG/ML
INJECTION, SOLUTION INTRAVENOUS PRN
Status: COMPLETED | OUTPATIENT
Start: 2024-12-10 | End: 2024-12-10

## 2024-12-10 RX ORDER — VERAPAMIL HYDROCHLORIDE 2.5 MG/ML
INJECTION, SOLUTION INTRAVENOUS PRN
Status: COMPLETED | OUTPATIENT
Start: 2024-12-10 | End: 2024-12-10

## 2024-12-10 RX ORDER — NITROGLYCERIN 20 MG/100ML
INJECTION INTRAVENOUS PRN
Status: COMPLETED | OUTPATIENT
Start: 2024-12-10 | End: 2024-12-10

## 2024-12-10 RX ORDER — MIDAZOLAM HYDROCHLORIDE 2 MG/2ML
INJECTION, SOLUTION INTRAMUSCULAR; INTRAVENOUS PRN
Status: COMPLETED | OUTPATIENT
Start: 2024-12-10 | End: 2024-12-10

## 2024-12-10 RX ORDER — IOPAMIDOL 612 MG/ML
INJECTION, SOLUTION INTRAVASCULAR PRN
Status: COMPLETED | OUTPATIENT
Start: 2024-12-10 | End: 2024-12-10

## 2024-12-10 RX ORDER — SODIUM CHLORIDE 9 MG/ML
INJECTION, SOLUTION INTRAVENOUS CONTINUOUS PRN
Status: COMPLETED | OUTPATIENT
Start: 2024-12-10 | End: 2024-12-10

## 2024-12-10 RX ORDER — LIDOCAINE HYDROCHLORIDE 10 MG/ML
INJECTION, SOLUTION EPIDURAL; INFILTRATION; INTRACAUDAL; PERINEURAL PRN
Status: COMPLETED | OUTPATIENT
Start: 2024-12-10 | End: 2024-12-10

## 2024-12-10 RX ORDER — LIDOCAINE 40 MG/G
CREAM TOPICAL PRN
Status: COMPLETED | OUTPATIENT
Start: 2024-12-10 | End: 2024-12-10

## 2024-12-10 RX ORDER — HEPARIN SODIUM 1000 [USP'U]/ML
INJECTION, SOLUTION INTRAVENOUS; SUBCUTANEOUS PRN
Status: COMPLETED | OUTPATIENT
Start: 2024-12-10 | End: 2024-12-10

## 2024-12-10 RX ORDER — FENTANYL CITRATE 50 UG/ML
INJECTION, SOLUTION INTRAMUSCULAR; INTRAVENOUS PRN
Status: COMPLETED | OUTPATIENT
Start: 2024-12-10 | End: 2024-12-10

## 2024-12-10 RX ADMIN — SODIUM BICARBONATE 0.5 MEQ: 42 INJECTION, SOLUTION INTRAVENOUS at 09:45

## 2024-12-10 RX ADMIN — NITROGLYCERIN 100 MCG: 20 INJECTION INTRAVENOUS at 09:45

## 2024-12-10 RX ADMIN — LIDOCAINE HYDROCHLORIDE 1 ML: 10 INJECTION, SOLUTION EPIDURAL; INFILTRATION; INTRACAUDAL; PERINEURAL at 09:42

## 2024-12-10 RX ADMIN — SODIUM CHLORIDE 125 ML/HR: 9 INJECTION, SOLUTION INTRAVENOUS at 09:35

## 2024-12-10 RX ADMIN — IOPAMIDOL 27 ML: 612 INJECTION, SOLUTION INTRAVENOUS at 09:56

## 2024-12-10 RX ADMIN — MIDAZOLAM HYDROCHLORIDE 1 MG: 1 INJECTION, SOLUTION INTRAMUSCULAR; INTRAVENOUS at 09:41

## 2024-12-10 RX ADMIN — NITROGLYCERIN 1 INCH: 20 OINTMENT TOPICAL at 09:09

## 2024-12-10 RX ADMIN — LIDOCAINE 1 TUBE: 40 CREAM TOPICAL at 09:09

## 2024-12-10 RX ADMIN — VERAPAMIL HYDROCHLORIDE 2.5 MG: 2.5 INJECTION, SOLUTION INTRAVENOUS at 09:44

## 2024-12-10 RX ADMIN — HEPARIN SODIUM 2000 UNITS: 1000 INJECTION INTRAVENOUS; SUBCUTANEOUS at 09:44

## 2024-12-10 RX ADMIN — FENTANYL CITRATE 50 MCG: 50 INJECTION, SOLUTION INTRAMUSCULAR; INTRAVENOUS at 09:41

## 2024-12-10 RX ADMIN — HEPARIN SODIUM 300 ML: 1000 INJECTION INTRAVENOUS; SUBCUTANEOUS at 09:42

## 2024-12-10 ASSESSMENT — PAIN - FUNCTIONAL ASSESSMENT: PAIN_FUNCTIONAL_ASSESSMENT: NONE - DENIES PAIN

## 2024-12-10 NOTE — H&P
Neurointerventional Surgery History and Physical      Patient: Gaby Stroud MRN: [id number]  SSN: [id number]    YOB: 1960  Age: 64 y.o.  Sex: female      History of Present Illness:    Gaby Stroud is a 65 y.o. female with a PMH of depression who presents to angio today for a scheduled follow up diagnostic cerebral angiogram s/p coil embolization of a ruptured ACOM aneurysm. Patient was admitted to the hospital at [facility] from 2024-2024 for a SAH due to ruptured ACOM aneurysm. She reports she was recently diagnosed with vertigo one month ago. She had dizzy spells, but this has since subsided. She had Epley maneuver performed for her vertigo. She denies any headache, vision changes, chest pain,  dizziness, SOB, numbness, tingling, weakness, nausea, vomiting, or speech difficulty. Patient was last seen by Dr. Stokes in the office on 2024 for follow up. She denies any other changes with her medical history.     Patient Active Problem List    Diagnosis Date Noted    SAH (subarachnoid hemorrhage) ([facility]) 2024    Subarachnoid hemorrhage due to ruptured aneurysm ([facility]) 2024     Current Facility-Administered Medications   Medication Dose Route Frequency Provider Last Rate Last Admin    lidocaine (LMX) 4 % cream    PRN Colin Stokes MD   1 Tube at 12/10/24 0909    nitroglycerin (NITRO-BID) 2 % ointment    PRN Colin Stokes MD   1 inch at 12/10/24 0909     Allergies   Allergen Reactions    Codeine Other (See Comments)     Bad dreams    Sulfa Antibiotics Hives     Past Medical History:   Diagnosis Date    Psychiatric disorder     depresssion      Past Surgical History:   Procedure Laterality Date    BREAST BIOPSY Right     neg    BREAST BIOPSY Left     X 3; all neg    GYN           Family History   Problem Relation Age of Onset    Breast Cancer Mother 63        X 2    Neuropathy Mother     Heart Disease Father     Cancer Father     Hypertension Father     Headache

## 2024-12-10 NOTE — BRIEF OP NOTE
Neuro-Interventional Surgery - Brief procedure note      Patient: Gaby Stroud MRN: [id number]     YOB: 1960  Age: 64 y.o.  Sex: female      Service: Neuro-Interventional Surgery    Date of Procedure: 12/10/2024    Pre-Procedure Diagnosis: Intracranial aneurysm    Post-Procedure Diagnosis: SAME    Procedure(s): Catheter cerebral/cervical arteriogram    Vessels selected: Left common carotid artery    Brief Description of Procedure: No residual or recurrent anterior communicating artery complex aneurysm noted.  No additional aneurysm identified.    Right radial artery puncture site hemostasis achieved using TR band. No immediate complications. No hematoma or oozing noted.  Distal hand perfusion remain unchanged post hemostasis.  Sterile dressing applied over the puncture site.      Anesthesia:Moderate Sedation    Estimated Blood Loss:  5 ml.    Specimens:  None    Implants:  None    Apparent Intraoperative Complications:  None immediate    Patient Condition:  Stable    Disposition:  Same day recovery unit    Attestation:  I performed the procedure.        Signed By: Colin Stokes MD     December 10, 2024     [facility] NEUROINTERVENTIONAL SURGERY

## 2024-12-10 NOTE — PRE SEDATION
Sedation Pre-Procedure Note    Patient Name: Gaby Stroud   YOB: 1960  Room/Bed: AIR/  Medical Record Number: [id number]  Date: 12/10/2024   Time: 9:15 AM       Indication:  Diagnostic cerebral angiogram for follow up of treated cerebral aneurysm.     Consent: Dr. Stokes has discussed with the patient and/or the patient representative the indication, alternatives, and the possible risks and/or complications of the planned procedure and the anesthesia methods. The patient and/or patient representative appear to understand and agree to proceed.    Vital Signs:   Vitals:    12/10/24 0900   BP: (!) 161/95   Pulse: 78   Resp: 19   Temp:    SpO2: 95%       Past Medical History:   has a past medical history of Psychiatric disorder.    Past Surgical History:   has a past surgical history that includes gyn; Breast biopsy (Right); and Breast biopsy (Left).    Medications:   Scheduled Meds:   Continuous Infusions:   PRN Meds: lidocaine, nitroglycerin  Home Meds:   Prior to Admission medications    Medication Sig Start Date End Date Taking? Authorizing Provider   venlafaxine (EFFEXOR XR) 150 MG extended release capsule Take 1 capsule by mouth daily   Yes Provider, MD Cristy   butalbital-acetaminophen-caffeine (FIORICET, ESGIC) -40 MG per tablet Take 1 tablet by mouth every 6 hours as needed for Headaches (For severe headaches)  Patient not taking: Reported on 8/14/2024 7/15/24   Starla Vicente MD   niMODipine (NIMOTOP) 30 MG capsule Take 2 capsules by mouth every 4 hours for 51 doses 7/15/24 7/24/24  Starla Vicente MD   meclizine (ANTIVERT) 25 MG tablet Take 1 tablet by mouth 3 times daily as needed  Patient not taking: Reported on 8/14/2024 9/6/16   Automatic Reconciliation, Ar     Coumadin Use Last 7 Days:  no  Antiplatelet drug therapy use last 7 days: no  Other anticoagulant use last 7 days: no  Additional Medication Information:  none      Pre-Sedation Documentation and Exam:   I have

## 2024-12-10 NOTE — PROGRESS NOTES
0835: Pt arrives ambulatory to angio department accompanied by spouse, Matt for diagnostic cerebral angiogram procedure. All assessments completed and consent was reviewed.  Education given was regarding procedure, moderate sedation, post-procedure care and  management/follow-up. Opportunity for questions was provided and all questions and concerns were addressed.     1130: Patient given copy of discharge instructions and verbalized understanding.  Patient wheeled to exit via wheelchair. Patient in NAD. No bleeding noted at puncture site.

## 2024-12-26 ENCOUNTER — HOSPITAL ENCOUNTER (OUTPATIENT)
Age: 64
Discharge: HOME | End: 2024-12-29
Payer: COMMERCIAL

## 2024-12-26 DIAGNOSIS — Z13.820 SPECIAL SCREENING FOR OSTEOPOROSIS: ICD-10-CM

## 2024-12-26 PROCEDURE — 77080 DXA BONE DENSITY AXIAL: CPT

## 2025-01-09 ENCOUNTER — TELEMEDICINE (OUTPATIENT)
Age: 65
End: 2025-01-09
Payer: COMMERCIAL

## 2025-01-09 DIAGNOSIS — I60.8 SUBARACHNOID HEMORRHAGE DUE TO RUPTURED ANEURYSM (HCC): Primary | ICD-10-CM

## 2025-01-09 PROCEDURE — 99213 OFFICE O/P EST LOW 20 MIN: CPT | Performed by: NURSE PRACTITIONER

## 2025-01-09 NOTE — PROGRESS NOTES
Phone call to patient in preparation for Virtual/phone visit with provider.  Name and  verified.  Patient unable to obtain vital signs at home.  Patient reports no problems since procedure.  Reports right radial healed without difficulty.  Reports decrease in frequency and intensity of headaches.  No new problems reported.

## 2025-01-09 NOTE — PROGRESS NOTES
Virtual Clinic Note    Gaby Stroud is a 64 y.o. female established patient who was seen by synchronous (real-time) audio-video technology on 1/9/2025.      Consent: Gaby Stroud, who was seen by synchronous (real-time) audio-video technology, and/or her healthcare decision maker, is aware that this patient-initiated, Telehealth encounter on 1/9/2025 is a billable service, with coverage as determined by her insurance carrier. She is aware that she may receive a bill and has provided verbal consent to proceed: Yes    Assessment & Plan:      We discussed the results of her most recent DSA with Dr Stokes, there is no residual of the embolized anterior communicating artery complex aneurysm.    Follow up MRA head WO in 1 year and RTC.     Reviewed today with the patient:   - BEFAST, call 911 if symptoms present   - Strict BP control (less than 140/90 long term)   - Call 911 for \"worse headache of life\" or new neurologic symptoms   - Avoid straining    I have spent at least 20 minutes reviewing previous notes, test results and face to face (virtual) with the patient discussing the diagnosis and importance of compliance with the treatment plan as well as documenting on the day of the visit.    Subjective:   Gaby Stroud is a 64 y.o. female w/ PMH of depression and SAH in bilateral sylvian fissures d/t rupture of A-comm aneurysm. Hunt Zhou I, Barrett grade II. S/p coil embolization of A-comm complex aneurysm on 7/3/2024 by Dr. Stokes.    She reports she has been doing well overall. She reports she has been cleared to return to driving and is back at work. She denies any headaches or new neuro symptoms. She reports her BP has been well controlled. She reports her son suffers from migraine headaches and is planning to have screening imaging for cerebral aneurysm as well.     Chief Complaint: SAH and Post-Op Check (Diagnostic angiogram follow up.)    DSA 12/10/24 by Dr Stokes:   The embolized anterior communicating artery

## 2025-01-25 ENCOUNTER — APPOINTMENT (OUTPATIENT)
Facility: HOSPITAL | Age: 65
DRG: 041 | End: 2025-01-25
Payer: COMMERCIAL

## 2025-01-25 ENCOUNTER — HOSPITAL ENCOUNTER (INPATIENT)
Facility: HOSPITAL | Age: 65
LOS: 4 days | Discharge: NURSING, REHAB OR LONG-TERM CARE | DRG: 041 | End: 2025-01-29
Attending: EMERGENCY MEDICINE | Admitting: FAMILY MEDICINE
Payer: COMMERCIAL

## 2025-01-25 DIAGNOSIS — R20.0 RIGHT LEG NUMBNESS: ICD-10-CM

## 2025-01-25 DIAGNOSIS — R29.90 STROKE-LIKE SYMPTOMS: ICD-10-CM

## 2025-01-25 DIAGNOSIS — I63.9 STROKE (HCC): ICD-10-CM

## 2025-01-25 DIAGNOSIS — R29.898 RIGHT LEG WEAKNESS: Primary | ICD-10-CM

## 2025-01-25 PROBLEM — R09.89 SUSPECTED CEREBROVASCULAR ACCIDENT: Status: ACTIVE | Noted: 2025-01-25

## 2025-01-25 LAB
ALBUMIN SERPL-MCNC: 3.7 G/DL (ref 3.5–5)
ALBUMIN/GLOB SERPL: 1 (ref 1.1–2.2)
ALP SERPL-CCNC: 95 U/L (ref 45–117)
ALT SERPL-CCNC: 24 U/L (ref 12–78)
AMPHET UR QL SCN: NEGATIVE
ANION GAP SERPL CALC-SCNC: 5 MMOL/L (ref 2–12)
ANION GAP SERPL CALC-SCNC: 6 MMOL/L (ref 2–12)
APPEARANCE UR: CLEAR
AST SERPL-CCNC: 33 U/L (ref 15–37)
BACTERIA URNS QL MICRO: NEGATIVE /HPF
BARBITURATES UR QL SCN: NEGATIVE
BASOPHILS # BLD: 0.03 K/UL (ref 0–0.1)
BASOPHILS NFR BLD: 0.7 % (ref 0–1)
BENZODIAZ UR QL: NEGATIVE
BILIRUB SERPL-MCNC: 0.1 MG/DL (ref 0.2–1)
BILIRUB UR QL: NEGATIVE
BUN SERPL-MCNC: 11 MG/DL (ref 6–20)
BUN SERPL-MCNC: 9 MG/DL (ref 6–20)
BUN/CREAT SERPL: 15 (ref 12–20)
BUN/CREAT SERPL: 17 (ref 12–20)
CALCIUM SERPL-MCNC: 8.4 MG/DL (ref 8.5–10.1)
CALCIUM SERPL-MCNC: 9.1 MG/DL (ref 8.5–10.1)
CANNABINOIDS UR QL SCN: NEGATIVE
CHLORIDE SERPL-SCNC: 102 MMOL/L (ref 97–108)
CHLORIDE SERPL-SCNC: 107 MMOL/L (ref 97–108)
CO2 SERPL-SCNC: 25 MMOL/L (ref 21–32)
CO2 SERPL-SCNC: 27 MMOL/L (ref 21–32)
COCAINE UR QL SCN: NEGATIVE
COLOR UR: ABNORMAL
COMMENT:: NORMAL
CREAT SERPL-MCNC: 0.62 MG/DL (ref 0.55–1.02)
CREAT SERPL-MCNC: 0.66 MG/DL (ref 0.55–1.02)
DIFFERENTIAL METHOD BLD: ABNORMAL
EOSINOPHIL # BLD: 0.02 K/UL (ref 0–0.4)
EOSINOPHIL NFR BLD: 0.5 % (ref 0–7)
EPITH CASTS URNS QL MICRO: ABNORMAL /LPF
ERYTHROCYTE [DISTWIDTH] IN BLOOD BY AUTOMATED COUNT: 13.2 % (ref 11.5–14.5)
ETHANOL SERPL-MCNC: <10 MG/DL (ref 0–0.08)
FOLATE SERPL-MCNC: 15.8 NG/ML (ref 5–21)
GLOBULIN SER CALC-MCNC: 3.6 G/DL (ref 2–4)
GLUCOSE BLD STRIP.AUTO-MCNC: 123 MG/DL (ref 65–117)
GLUCOSE BLD STRIP.AUTO-MCNC: 98 MG/DL (ref 65–117)
GLUCOSE SERPL-MCNC: 95 MG/DL (ref 65–100)
GLUCOSE SERPL-MCNC: 96 MG/DL (ref 65–100)
GLUCOSE UR STRIP.AUTO-MCNC: NEGATIVE MG/DL
HCT VFR BLD AUTO: 39.4 % (ref 35–47)
HGB BLD-MCNC: 13.3 G/DL (ref 11.5–16)
HGB UR QL STRIP: NEGATIVE
IMM GRANULOCYTES # BLD AUTO: 0.01 K/UL (ref 0–0.04)
IMM GRANULOCYTES NFR BLD AUTO: 0.2 % (ref 0–0.5)
INR PPP: 0.9 (ref 0.9–1.1)
KETONES UR QL STRIP.AUTO: NEGATIVE MG/DL
LEUKOCYTE ESTERASE UR QL STRIP.AUTO: NEGATIVE
LYMPHOCYTES # BLD: 1.42 K/UL (ref 0.8–3.5)
LYMPHOCYTES NFR BLD: 34.4 % (ref 12–49)
Lab: NORMAL
MCH RBC QN AUTO: 32.4 PG (ref 26–34)
MCHC RBC AUTO-ENTMCNC: 33.8 G/DL (ref 30–36.5)
MCV RBC AUTO: 95.9 FL (ref 80–99)
METHADONE UR QL: NEGATIVE
MONOCYTES # BLD: 0.56 K/UL (ref 0–1)
MONOCYTES NFR BLD: 13.6 % (ref 5–13)
NEUTS SEG # BLD: 2.09 K/UL (ref 1.8–8)
NEUTS SEG NFR BLD: 50.6 % (ref 32–75)
NITRITE UR QL STRIP.AUTO: POSITIVE
NRBC # BLD: 0 K/UL (ref 0–0.01)
NRBC BLD-RTO: 0 PER 100 WBC
OPIATES UR QL: NEGATIVE
PCP UR QL: NEGATIVE
PH UR STRIP: 7 (ref 5–8)
PLATELET # BLD AUTO: 318 K/UL (ref 150–400)
PMV BLD AUTO: 9.6 FL (ref 8.9–12.9)
POTASSIUM SERPL-SCNC: 3.9 MMOL/L (ref 3.5–5.1)
POTASSIUM SERPL-SCNC: 4.2 MMOL/L (ref 3.5–5.1)
PROT SERPL-MCNC: 7.3 G/DL (ref 6.4–8.2)
PROT UR STRIP-MCNC: NEGATIVE MG/DL
PROTHROMBIN TIME: 10.1 SEC (ref 9.2–11.2)
RBC # BLD AUTO: 4.11 M/UL (ref 3.8–5.2)
RBC #/AREA URNS HPF: ABNORMAL /HPF (ref 0–5)
SERVICE CMNT-IMP: ABNORMAL
SERVICE CMNT-IMP: NORMAL
SODIUM SERPL-SCNC: 132 MMOL/L (ref 136–145)
SODIUM SERPL-SCNC: 140 MMOL/L (ref 136–145)
SP GR UR REFRACTOMETRY: >1.03 (ref 1–1.03)
SPECIMEN HOLD: NORMAL
TROPONIN I SERPL HS-MCNC: 39 NG/L (ref 0–51)
TSH SERPL DL<=0.05 MIU/L-ACNC: 5.2 UIU/ML (ref 0.36–3.74)
URINE CULTURE IF INDICATED: ABNORMAL
UROBILINOGEN UR QL STRIP.AUTO: 0.2 EU/DL (ref 0.2–1)
VIT B12 SERPL-MCNC: 418 PG/ML (ref 193–986)
WBC # BLD AUTO: 4.1 K/UL (ref 3.6–11)
WBC URNS QL MICRO: ABNORMAL /HPF (ref 0–4)

## 2025-01-25 PROCEDURE — 85025 COMPLETE CBC W/AUTO DIFF WBC: CPT

## 2025-01-25 PROCEDURE — APPNB45 APP NON BILLABLE 31-45 MINUTES

## 2025-01-25 PROCEDURE — 82607 VITAMIN B-12: CPT

## 2025-01-25 PROCEDURE — 82746 ASSAY OF FOLIC ACID SERUM: CPT

## 2025-01-25 PROCEDURE — 80053 COMPREHEN METABOLIC PANEL: CPT

## 2025-01-25 PROCEDURE — 93005 ELECTROCARDIOGRAM TRACING: CPT | Performed by: EMERGENCY MEDICINE

## 2025-01-25 PROCEDURE — 84443 ASSAY THYROID STIM HORMONE: CPT

## 2025-01-25 PROCEDURE — 6360000004 HC RX CONTRAST MEDICATION: Performed by: RADIOLOGY

## 2025-01-25 PROCEDURE — 99285 EMERGENCY DEPT VISIT HI MDM: CPT

## 2025-01-25 PROCEDURE — 70498 CT ANGIOGRAPHY NECK: CPT

## 2025-01-25 PROCEDURE — 81001 URINALYSIS AUTO W/SCOPE: CPT

## 2025-01-25 PROCEDURE — 97165 OT EVAL LOW COMPLEX 30 MIN: CPT

## 2025-01-25 PROCEDURE — 85610 PROTHROMBIN TIME: CPT

## 2025-01-25 PROCEDURE — 36415 COLL VENOUS BLD VENIPUNCTURE: CPT

## 2025-01-25 PROCEDURE — 6370000000 HC RX 637 (ALT 250 FOR IP): Performed by: FAMILY MEDICINE

## 2025-01-25 PROCEDURE — 82962 GLUCOSE BLOOD TEST: CPT

## 2025-01-25 PROCEDURE — 82077 ASSAY SPEC XCP UR&BREATH IA: CPT

## 2025-01-25 PROCEDURE — 97116 GAIT TRAINING THERAPY: CPT

## 2025-01-25 PROCEDURE — 99223 1ST HOSP IP/OBS HIGH 75: CPT | Performed by: NURSE PRACTITIONER

## 2025-01-25 PROCEDURE — 70450 CT HEAD/BRAIN W/O DYE: CPT

## 2025-01-25 PROCEDURE — 2060000000 HC ICU INTERMEDIATE R&B

## 2025-01-25 PROCEDURE — 97535 SELF CARE MNGMENT TRAINING: CPT

## 2025-01-25 PROCEDURE — 0042T CT BRAIN PERFUSION: CPT

## 2025-01-25 PROCEDURE — 2500000003 HC RX 250 WO HCPCS: Performed by: FAMILY MEDICINE

## 2025-01-25 PROCEDURE — 97161 PT EVAL LOW COMPLEX 20 MIN: CPT

## 2025-01-25 PROCEDURE — 80307 DRUG TEST PRSMV CHEM ANLYZR: CPT

## 2025-01-25 PROCEDURE — 84484 ASSAY OF TROPONIN QUANT: CPT

## 2025-01-25 RX ORDER — SODIUM CHLORIDE 0.9 % (FLUSH) 0.9 %
5-40 SYRINGE (ML) INJECTION PRN
Status: DISCONTINUED | OUTPATIENT
Start: 2025-01-25 | End: 2025-01-29 | Stop reason: HOSPADM

## 2025-01-25 RX ORDER — SODIUM CHLORIDE 0.9 % (FLUSH) 0.9 %
5-40 SYRINGE (ML) INJECTION EVERY 12 HOURS SCHEDULED
Status: DISCONTINUED | OUTPATIENT
Start: 2025-01-25 | End: 2025-01-29 | Stop reason: HOSPADM

## 2025-01-25 RX ORDER — SODIUM CHLORIDE 9 MG/ML
INJECTION, SOLUTION INTRAVENOUS PRN
Status: DISCONTINUED | OUTPATIENT
Start: 2025-01-25 | End: 2025-01-29 | Stop reason: HOSPADM

## 2025-01-25 RX ORDER — LORAZEPAM 2 MG/ML
1 INJECTION INTRAMUSCULAR
Status: DISCONTINUED | OUTPATIENT
Start: 2025-01-25 | End: 2025-01-29 | Stop reason: HOSPADM

## 2025-01-25 RX ORDER — POLYETHYLENE GLYCOL 3350 17 G/17G
17 POWDER, FOR SOLUTION ORAL DAILY PRN
Status: DISCONTINUED | OUTPATIENT
Start: 2025-01-25 | End: 2025-01-29 | Stop reason: HOSPADM

## 2025-01-25 RX ORDER — ASPIRIN 81 MG/1
81 TABLET, CHEWABLE ORAL DAILY
Status: DISCONTINUED | OUTPATIENT
Start: 2025-01-25 | End: 2025-01-29 | Stop reason: HOSPADM

## 2025-01-25 RX ORDER — FOLIC ACID 1 MG/1
1 TABLET ORAL DAILY
Status: DISCONTINUED | OUTPATIENT
Start: 2025-01-25 | End: 2025-01-29 | Stop reason: HOSPADM

## 2025-01-25 RX ORDER — ONDANSETRON 2 MG/ML
4 INJECTION INTRAMUSCULAR; INTRAVENOUS EVERY 6 HOURS PRN
Status: DISCONTINUED | OUTPATIENT
Start: 2025-01-25 | End: 2025-01-29 | Stop reason: HOSPADM

## 2025-01-25 RX ORDER — LORAZEPAM 1 MG/1
4 TABLET ORAL
Status: DISCONTINUED | OUTPATIENT
Start: 2025-01-25 | End: 2025-01-29 | Stop reason: HOSPADM

## 2025-01-25 RX ORDER — LORAZEPAM 2 MG/ML
3 INJECTION INTRAMUSCULAR
Status: DISCONTINUED | OUTPATIENT
Start: 2025-01-25 | End: 2025-01-29 | Stop reason: HOSPADM

## 2025-01-25 RX ORDER — LORAZEPAM 1 MG/1
1 TABLET ORAL
Status: DISCONTINUED | OUTPATIENT
Start: 2025-01-25 | End: 2025-01-29 | Stop reason: HOSPADM

## 2025-01-25 RX ORDER — LANOLIN ALCOHOL/MO/W.PET/CERES
100 CREAM (GRAM) TOPICAL DAILY
Status: DISCONTINUED | OUTPATIENT
Start: 2025-01-25 | End: 2025-01-29 | Stop reason: HOSPADM

## 2025-01-25 RX ORDER — LORAZEPAM 1 MG/1
2 TABLET ORAL
Status: DISCONTINUED | OUTPATIENT
Start: 2025-01-25 | End: 2025-01-29 | Stop reason: HOSPADM

## 2025-01-25 RX ORDER — IOPAMIDOL 755 MG/ML
100 INJECTION, SOLUTION INTRAVASCULAR
Status: COMPLETED | OUTPATIENT
Start: 2025-01-25 | End: 2025-01-25

## 2025-01-25 RX ORDER — ASPIRIN 300 MG/1
300 SUPPOSITORY RECTAL DAILY
Status: DISCONTINUED | OUTPATIENT
Start: 2025-01-25 | End: 2025-01-29 | Stop reason: HOSPADM

## 2025-01-25 RX ORDER — VENLAFAXINE HYDROCHLORIDE 150 MG/1
150 CAPSULE, EXTENDED RELEASE ORAL DAILY
Status: DISCONTINUED | OUTPATIENT
Start: 2025-01-25 | End: 2025-01-29 | Stop reason: HOSPADM

## 2025-01-25 RX ORDER — ONDANSETRON 4 MG/1
4 TABLET, ORALLY DISINTEGRATING ORAL EVERY 8 HOURS PRN
Status: DISCONTINUED | OUTPATIENT
Start: 2025-01-25 | End: 2025-01-29 | Stop reason: HOSPADM

## 2025-01-25 RX ORDER — LORAZEPAM 2 MG/ML
2 INJECTION INTRAMUSCULAR
Status: DISCONTINUED | OUTPATIENT
Start: 2025-01-25 | End: 2025-01-29 | Stop reason: HOSPADM

## 2025-01-25 RX ORDER — HYDRALAZINE HYDROCHLORIDE 20 MG/ML
10 INJECTION INTRAMUSCULAR; INTRAVENOUS EVERY 6 HOURS PRN
Status: DISCONTINUED | OUTPATIENT
Start: 2025-01-25 | End: 2025-01-29 | Stop reason: HOSPADM

## 2025-01-25 RX ORDER — LORAZEPAM 1 MG/1
3 TABLET ORAL
Status: DISCONTINUED | OUTPATIENT
Start: 2025-01-25 | End: 2025-01-29 | Stop reason: HOSPADM

## 2025-01-25 RX ORDER — LORAZEPAM 2 MG/ML
4 INJECTION INTRAMUSCULAR
Status: DISCONTINUED | OUTPATIENT
Start: 2025-01-25 | End: 2025-01-29 | Stop reason: HOSPADM

## 2025-01-25 RX ORDER — MULTIVITAMIN WITH IRON
1 TABLET ORAL DAILY
Status: DISCONTINUED | OUTPATIENT
Start: 2025-01-25 | End: 2025-01-29 | Stop reason: HOSPADM

## 2025-01-25 RX ADMIN — VENLAFAXINE HYDROCHLORIDE 150 MG: 150 CAPSULE, EXTENDED RELEASE ORAL at 09:53

## 2025-01-25 RX ADMIN — ASPIRIN 81 MG CHEWABLE TABLET 81 MG: 81 TABLET CHEWABLE at 09:53

## 2025-01-25 RX ADMIN — Medication 100 MG: at 12:15

## 2025-01-25 RX ADMIN — IOPAMIDOL 40 ML: 755 INJECTION, SOLUTION INTRAVENOUS at 03:25

## 2025-01-25 RX ADMIN — IOPAMIDOL 80 ML: 755 INJECTION, SOLUTION INTRAVENOUS at 03:26

## 2025-01-25 RX ADMIN — SODIUM CHLORIDE, PRESERVATIVE FREE 10 ML: 5 INJECTION INTRAVENOUS at 12:16

## 2025-01-25 RX ADMIN — THERA TABS 1 TABLET: TAB at 12:15

## 2025-01-25 RX ADMIN — Medication 1 MG: at 12:15

## 2025-01-25 ASSESSMENT — PAIN SCALES - GENERAL: PAINLEVEL_OUTOF10: 0

## 2025-01-25 ASSESSMENT — PAIN - FUNCTIONAL ASSESSMENT
PAIN_FUNCTIONAL_ASSESSMENT: NONE - DENIES PAIN
PAIN_FUNCTIONAL_ASSESSMENT: 0-10

## 2025-01-25 NOTE — ED NOTES
Assumed care of patient, pt completed paper MRI sheet, call bell within reach, plan of care discussed

## 2025-01-25 NOTE — ED TRIAGE NOTES
Pt c/o BL hand numbness yesterday at 0800. Went to bed at 2300. Woke up around 0200 with weakness in her right leg. Pt states she was unable to ambulate back from restroom.

## 2025-01-25 NOTE — PROGRESS NOTES
Code Stroke Documentation      Symptoms: Bilateral hand numbness, R leg weakness   Baseline mRS:   0   Last Known Well: 0800   Medical hx: Past Medical History:   Diagnosis Date    Psychiatric disorder     depresssion       Vitals: Vitals:    25 0305   BP: (!) 172/98   Pulse: 87   Resp: 18   SpO2: 94%      AC/APT:  none   VAN: Negative   NIHSS: 1a-LOC:0  1b-Month/Age:0  1c-Open/Close Hand:0  2-Best Gaze:0  3-Visual Fields:0  4-Facial Palsy:1  5a-Left Arm:0  5b-Right Arm:0  6a-Left Le  6b-Right Le  7-Limb Ataxia:0  8-Sensory:0  9-Best Language:0  10-Dysarthria:0  11-Extinction/Inattention:0  TOTAL SCORE:1   Imaging (personally reviewed): CT: No acute processes  CTA:No LVO. Prior coil embo of BILL aneurysm  CTP: No perfusion defect   Plan: tNK Candidate: NO  Mechanical thrombectomy Candidate: NO    *Perform dysphagia screening prior to any PO intake*     Discussed with: Dr Begum, ED- Dr Altman Tele-Neuro    Arrival time: 0309    I have spent 40 mins of critical care time involved in lab review, consultations with specialist, family decision making and documentation. During this entire length of time I was immediately available to the patient.    JEANETTE Lechuga - CNP  Neurovascular Nurse Practitioner  [phone]

## 2025-01-25 NOTE — PLAN OF CARE
Problem: Occupational Therapy - Adult  Goal: By Discharge: Performs self-care activities at highest level of function for planned discharge setting.  See evaluation for individualized goals.  Description: FUNCTIONAL STATUS PRIOR TO ADMISSION:  Patient was ambulatory using no DME. Working full time and independent in her self-care.    HOME SUPPORT: Patient lived alone with family in the area for help as needed. PMHx significant for SAH 2* ACOM aneurysm rupture    Occupational Therapy Goals:  Initiated 1/25/2025  1.  Patient will perform bathing with Modified [place] within 7 day(s).  2.  Patient will perform upper body dressing with Modified [place] within 7 day(s).  3.  Patient will perform lower body dressing with Modified [place] within 7 day(s).  4.  Patient will perform toilet transfers with Modified [place]  within 7 day(s).  5.  Patient will perform all aspects of toileting with Modified [place] within 7 day(s).  6.  Patient will participate in upper extremity therapeutic exercise/activities with Modified [place] for 15 minutes within 7 day(s).    7.  Patient will utilize energy conservation techniques during functional activities with verbal cues within 7 day(s).    Outcome: Progressing     OCCUPATIONAL THERAPY EVALUATION    Patient: Gaby Stroud (65 y.o. female)  Date: 1/25/2025  Primary Diagnosis: Stroke-like symptoms [R29.90]         Precautions: Fall Risk                  ASSESSMENT :  The patient is limited by decreased functional mobility, independence in ADLs, high-level IADLs, strength, endurance, balance following admission for left sided weakness and coordination deficits. Pt was AAOx4 today, pleasant, sitting EOB for assessment and performed room mobility with close SBA. The patient had some decreased strength noted on RUE (LUE dominant). She has mild coordination and dysmetria noted in RUE. The patient was independent at baseline, recovering from a SAH fuentes to ACOM  Extension: Full  Grasp A: Full  Grasp B: Partial  Grasp C: Partial  Grasp D: Partial  Grasp E: Partial  Hand Total: 10    Coordination/Speed  Tremor: None  Dysmetria: Slight  Time: 2-5s  Coordination/Speed Total: 4    Total A-D  Total A-D (Motor Function): 58         This is a reliable/valid measure of arm function after a neurological event. It has established value to characterize functional status and for measuring spontaneous and therapy-induced recovery; tests proximal and distal motor functions. Fugl-Garnett Assessment - UE scores recorded between five and 30 days post neurologic event can be used to predict UE recovery at six months post neurologic event.  Severe = 0-21 points   Moderately Severe = 22-33 points   Moderate = 34-47 points   Mild = 48-66 points  WESLEY Adams, ANNA Martinez, ESA Preston, BALBIR Marley, & ALBER Alegria (1992). Measurement of motor recovery after stroke: Outcome assessment and sample size requirements. Stroke, 23, pp. 4245-2103.   --------------------------------------------------------------------------------------------------------------------------------------------------------------------  MCID:  Stroke:   (Fco et al, 2001; n = 171; mean age 70 (11) years; assessed within 17 (12) days of stroke, Acute Stroke)  FMA Motor Scores from Admission to Discharge   10 point increase in FMA Upper Extremity = 1.5 change in discharge FIM   10 point increase in FMA Lower Extremity = 1.9 change in discharge FIM  MDC:   Stroke:   (Donell et al, 2008, n = 14, mean age = 59.9 (14.6) years, assessed on average 14 (6.5) months post stroke, Chronic Stroke)   FMA = 5.2 points for the Upper Extremity portion of the assessment           Pain Rating:  None reported    Pain Intervention(s):       Activity Tolerance:   Good    After treatment:   Patient left in no apparent distress in bed, Call bell within reach, and Caregiver / family present    COMMUNICATION/EDUCATION:   The patient's plan of care was

## 2025-01-25 NOTE — PROGRESS NOTES
[facility] Adult  Hospitalist Group                                                                                          Hospitalist Progress Note  Estelle Wheeler MD  Answering service: [phone] OR [phone] from in house phone        Date of Service:  2025  NAME:  Gaby Stroud  :  1960  MRN:  [id number]       Admission Summary:   HPI: \"Gaby Stroud is a 65 y.o. female with past medical history of depression presented to the ED with chief complaint of right leg weakness, inability ambulate, and incoordination of both hands.  Patient reportedly was last known well at about 0800 hrs. on 2025.  At that time, patient had onset of hand coordination.  Patient states \"could not use computer at work\".  Symptoms notedly remain constant, without pacifically bating factors.  Tonight at 2300 hrs., she reportedly went to sleep.  When she awoke this morning on 2025 at 0200 hrs., she reported weakness in her right leg and was unable to ambulate.  He decided come to emergency department.  There is no reports of head and neck trauma, loss of consciousness, loss of slurred speech, or facial droop.  On arrival emergency department, initial reported vital signs were blood pressure 172/90, heart rate 87, respiratory rate 18, O2 saturation 94% on room air.  12-lead EKG showed normal sinus rhythm at 82 bpm.  CT head without IV contrast showed no acute intracranial abnormalities.  CTA head and neck with IV contrast showed previous embolization of ACOM aneurysm but no large vessel occlusion, no perfusion defect.  Patient was seen by ED attending physician, neuro critical care NP, and teleneurologist in consultation.  Patient is now seen for admission to the hospitalist service.        Of note, patient has prior hospitalization from 2024 to 2024 with subarachnoid hemorrhage secondary to ruptured ACOM aneurysm.  On 7/3/2024, underwent endovascular coil embolization of ACOM complex  results for input(s): \"PH\", \"PCO2\", \"PO2\" in the last 72 hours.  No results for input(s): \"CPK\" in the last 72 hours.    Invalid input(s): \"CPKMB\", \"CKNDX\", \"TROIQ\"  Lab Results   Component Value Date/Time    CHOL 204 07/03/2024 12:15 AM    HDL 92 07/03/2024 12:15 AM    .8 07/03/2024 12:15 AM     No results found for: \"GLUCPOC\"  [unfilled]    Notes reviewed from all clinical/nonclinical/nursing services involved in patient's clinical care. Care coordination discussions were held with appropriate clinical/nonclinical/ nursing providers based on care coordination needs.         Patients current active Medications were reviewed, considered, added and adjusted based on the clinical condition today.      Home Medications were reconciled to the best of my ability given all available resources at the time of admission. Route is PO if not otherwise noted.      Admission Status:[id number]:::1}      Medications Reviewed:     Current Facility-Administered Medications   Medication Dose Route Frequency    venlafaxine (EFFEXOR XR) extended release capsule 150 mg  150 mg Oral Daily    sodium chloride flush 0.9 % injection 5-40 mL  5-40 mL IntraVENous 2 times per day    sodium chloride flush 0.9 % injection 5-40 mL  5-40 mL IntraVENous PRN    0.9 % sodium chloride infusion   IntraVENous PRN    ondansetron (ZOFRAN-ODT) disintegrating tablet 4 mg  4 mg Oral Q8H PRN    Or    ondansetron (ZOFRAN) injection 4 mg  4 mg IntraVENous Q6H PRN    polyethylene glycol (GLYCOLAX) packet 17 g  17 g Oral Daily PRN    aspirin chewable tablet 81 mg  81 mg Oral Daily    Or    aspirin suppository 300 mg  300 mg Rectal Daily    thiamine tablet 100 mg  100 mg Oral Daily    folic acid (FOLVITE) tablet 1 mg  1 mg Oral Daily    multivitamin 1 tablet  1 tablet Oral Daily    sodium chloride flush 0.9 % injection 5-40 mL  5-40 mL IntraVENous 2 times per day    sodium chloride flush 0.9 % injection 5-40 mL  5-40 mL IntraVENous PRN    LORazepam (ATIVAN)

## 2025-01-25 NOTE — CONSULTS
Neurology Consult  [facility]  Neurocritical Care NP    Patient: Gaby Stroud MRN: [id number]  SSN: [id number]    YOB: 1960  Age: 65 y.o.  Sex: female        Chief Complaint: hand incoordination, leg weakness     Subjective:      Gaby Stroud is a 65 y.o. left-handed female with past medical history significant for depression, alcoholism, and previous subarachnoid hemorrhage due to rupture of ACOM aneurysm status post coil embolization on 7/3/2024 by Dr. Stokes who presented to the ED on the early morning of 1/25/2024 with complaints of bilateral hand incoordination and bilateral leg weakness.  Last known well was yesterday at 0800 AM.  Patient reports that while at work during the day she noticed her hands were incoordinated while typing on her keyboard at work. She denies any numbness or tingling. She reports later on after she went to sleep, she woke up around 1:30 in the morning the next day and both of her legs were weak and she could not get out of bed. She also reports some difficulty getting words out since last night with her symptoms. She was worried about a stroke given her history so she went to the ED for evaluation. CT of the head showed no acute process.  CTA of the head and neck showed no large vessel occlusion or hemodynamically significant carotid stenosis.Coil pack in the anterior communicating artery aneurysm without evidence of remnant aneurysm. CTP showed No perfusion abnormality. Patient was last seen virtually with [facility] outpatient on 1/9/2025 for aneurysm treatment follow up. Plan was to follow up in 1 year with MRA of Head WO. Patient admitted for further stroke up. Neurology is consulted for further evaluation.     Patient denies any headache, vision changes, dizziness, chest pain, SOB, numbness, tingling, nausea or vomiting. She does feel like her right arm is weak and complains of legs feeling weak with associated balance/coordination issues. She is not  Albumin/Globulin Ratio 1.0 (L) 1.1 - 2.2     Protime-INR    Collection Time: 01/25/25  3:10 AM   Result Value Ref Range    INR 0.9 0.9 - 1.1      Protime 10.1 9.2 - 11.2 sec   Troponin    Collection Time: 01/25/25  3:10 AM   Result Value Ref Range    Troponin, High Sensitivity 39 0 - 51 ng/L   Vitamin B12 & Folate    Collection Time: 01/25/25  3:10 AM   Result Value Ref Range    Vitamin B-12 418 193 - 986 pg/mL    Folate 15.8 5.0 - 21.0 ng/mL   TSH    Collection Time: 01/25/25  3:10 AM   Result Value Ref Range    TSH, 3rd Generation 5.20 (H) 0.36 - 3.74 uIU/mL   EKG 12 Lead    Collection Time: 01/25/25  3:45 AM   Result Value Ref Range    Ventricular Rate 82 BPM    Atrial Rate 82 BPM    P-R Interval 158 ms    QRS Duration 74 ms    Q-T Interval 394 ms    QTc Calculation (Bazett) 460 ms    P Axis 71 degrees    R Axis 64 degrees    T Axis 61 degrees    Diagnosis       Normal sinus rhythm  Normal ECG  When compared with ECG of 02-JUL-2024 17:42,  No significant change was found     Extra Tubes Hold    Collection Time: 01/25/25  4:37 AM   Result Value Ref Range    Specimen HOld 1UA 1UC     Comment:        Add-on orders for these samples will be processed based on acceptable specimen integrity and analyte stability, which may vary by analyte.   Urinalysis with Reflex to Culture    Collection Time: 01/25/25  4:37 AM    Specimen: Urine   Result Value Ref Range    Color, UA YELLOW/STRAW      Appearance CLEAR CLEAR      Specific Gravity, UA >1.030 (H) 1.003 - 1.030    pH, Urine 7.0 5.0 - 8.0      Protein, UA Negative NEG mg/dL    Glucose, Ur Negative NEG mg/dL    Ketones, Urine Negative NEG mg/dL    Bilirubin, Urine Negative NEG      Blood, Urine Negative NEG      Urobilinogen, Urine 0.2 0.2 - 1.0 EU/dL    Nitrite, Urine Positive (A) NEG      Leukocyte Esterase, Urine Negative NEG      WBC, UA 0-4 0 - 4 /hpf    RBC, UA 0-5 0 - 5 /hpf    Epithelial Cells, UA FEW FEW /lpf    BACTERIA, URINE Negative NEG /hpf    Urine Culture if

## 2025-01-25 NOTE — PLAN OF CARE
Problem: Physical Therapy - Adult  Goal: By Discharge: Performs mobility at highest level of function for planned discharge setting.  See evaluation for individualized goals.  Description: FUNCTIONAL STATUS PRIOR TO ADMISSION: Patient was independent and active without use of DME. Returned to working and driving.    HOME SUPPORT PRIOR TO ADMISSION: The patient lived alone with family to provide assistance if/as needed. PMHx significant for SAH 2* ACOM aneurysm rupture.     Physical Therapy Goals  Initiated 1/25/2025  1.  Patient will move from supine to sit and sit to supine and scoot up and down in bed with independence within 7 day(s).    2.  Patient will perform sit to stand with independence within 7 day(s).  3.  Patient will transfer from bed to chair and chair to bed with independence using the least restrictive device within 7 day(s).  4.  Patient will ambulate with modified independence for 300 feet with the least restrictive device within 7 day(s).   5.  Patient will ascend/descend 12 stairs with handrail(s) with modified independence within 7 day(s).  6.  Patient will improve Bhakta Balance score by 7 points within 7 days.    Outcome: Progressing   PHYSICAL THERAPY EVALUATION    Patient: Gaby Stroud (65 y.o. female)  Date: 1/25/2025  Primary Diagnosis: Stroke-like symptoms [R29.90]       Precautions: Restrictions/Precautions: Fall Risk                      ASSESSMENT :   DEFICITS/IMPAIRMENTS:   The patient presents with impaired functional mobility as compared to baseline level 2* mild R sided weakness, impaired R coordination, impaired balance, impaired gait, and generalized tremulousness following admission for CVA work up. CT negative, MRI pending at time of eval. PMHx significant for previous SAH 2* ACOM aneurysm rupture in July 2024. At baseline, she lives at home alone and is fully indep and active, back to working full time and driving.    Received pt in ED, cleared for mobility. BP elevated  minor use of hands  6. Standing Unsupported with Eyes Closed: Able to stand 10 seconds safely  7. Standing Unsupported with Feet Together: Able to place feet together independently and stand 1 minute safely  8. Reach Forward with Outstretched Arm While Standing: Can reach forward 12 cm (5 inches)  9.  Object from Floor from a Standing Position: Able to  slipper but needs supervision  10. Turning to Look Behind Over Left and Right Shoulders While Standing: Looks behind one side only other side shows less weight shift  11. Turn 360 Degrees: Able to turn 360 degrees safely one side only 4 seconds or less  12. Place Alternate Foot on Step or Stool While Standing Unsupported: Able to complete greater than 2 steps needs minimal assist  13. Standing Unsupported One Foot in Front: Needs help to step but can hold 15 seconds  14. Standing on One Leg: Tries to lift leg unable to hold 3 seconds but remains standing independently  Bhakta Balance Score: 43         56=Maximum possible score;   0-20=High fall risk  21-40=Moderate fall risk   41-56=Low fall risk                                                                                                                                                                                                                                     Pain Ratin/10   Pain Intervention(s):       Activity Tolerance:   Good and requires rest breaks    After treatment:   Patient left in no apparent distress in bed, Call bell within reach, Caregiver / family present, and Side rails x3    COMMUNICATION/EDUCATION:   The patient's plan of care was discussed with: registered nurse    Patient Education  Education Given To: Patient;Family  Education Provided: Role of Therapy;Plan of Care;Home Exercise Program;Fall Prevention Strategies  Education Method: Verbal  Barriers to Learning: None  Education Outcome: Verbalized understanding    Thank you for this referral.  Nichole Turcios, PT,

## 2025-01-25 NOTE — H&P
History and Physical    Date of Service:  1/25/2025  Primary Care Provider: Vignesh Michelle MD  Source of information: The patient and Chart review    Chief Complaint:  weakness in right leg      History of Presenting Illness:   Gaby Stroud is a 65 y.o. female with past medical history of depression presented to the ED with chief complaint of right leg weakness, inability ambulate, and incoordination of both hands.  Patient reportedly was last known well at about 0800 hrs. on 1/24/2025.  At that time, patient had onset of hand coordination.  Patient states \"could not use computer at work\".  Symptoms notedly remain constant, without pacifically bating factors.  Tonight at 2300 hrs., she reportedly went to sleep.  When she awoke this morning on 1/25/2025 at 0200 hrs., she reported weakness in her right leg and was unable to ambulate.  He decided come to emergency department.  There is no reports of head and neck trauma, loss of consciousness, loss of slurred speech, or facial droop.  On arrival emergency department, initial reported vital signs were blood pressure 172/90, heart rate 87, respiratory rate 18, O2 saturation 94% on room air.  12-lead EKG showed normal sinus rhythm at 82 bpm.  CT head without IV contrast showed no acute intracranial abnormalities.  CTA head and neck with IV contrast showed previous embolization of ACOM aneurysm but no large vessel occlusion, no perfusion defect.  Patient was seen by ED attending physician, neuro critical care NP, and teleneurologist in consultation.  Patient is now seen for admission to the hospitalist service.      Of note, patient has prior hospitalization from 7/2/2024 to 7/16/2024 with subarachnoid hemorrhage secondary to ruptured ACOM aneurysm.  On 7/3/2024, underwent endovascular coil embolization of ACOM complex aneurysm.  On 12/10/2024, patient had IR cerebral angiogram which showed no residual/recurrent ACOM aneurysm.     REVIEW OF SYSTEMS:  Pertinent  Notable for the following components:       Result Value    Monocytes % 13.6 (*)     All other components within normal limits   COMPREHENSIVE METABOLIC PANEL - Abnormal; Notable for the following components:    Total Bilirubin 0.1 (*)     Albumin/Globulin Ratio 1.0 (*)     All other components within normal limits       [unfilled]    IMAGING:   CTA HEAD NECK W CONTRAST         CT BRAIN PERFUSION         CT HEAD WO CONTRAST   Final Result      No acute process.      Electronically signed by Maxi Blackmon      MRI brain without contrast    (Results Pending)        ECG/ECHO:  [unfilled]       Notes reviewed from all clinical/nonclinical/nursing services involved in patient's clinical care. Care coordination discussions were held with appropriate clinical/nonclinical/ nursing providers based on care coordination needs.     Assessment:   Given the patient's current clinical presentation, there is a high level of concern for decompensation if discharged from the emergency department. Complex decision making was performed, which includes reviewing the patient's available past medical records, laboratory results, and imaging studies.    Principal Problem:    Stroke-like symptoms      Resolved Problems:    * No resolved hospital problems. *      Plan:     Stroke-like symptoms        Weakness of right lower extremity        Incoordination of extremity  -Bilateral hand incoordination  -Admit to neuro/stroke floor  -Order MRI brain without IV contrast  -Consult neurologist  -Order neuro checks and fall precautions  -Consult PT/OT  -Aspirin 81 mg p.o. daily  -Allow for permissive hypertension    2.  Inability to walk  -Ambulate with assistance only  -Plan as above    3.  Elevated blood pressure without history of hypertension  -Monitor BP  -Plan as above  -Provide antihypertensive medications as needed    4.  Alcohol use disorder  -Encourage alcohol reduction to no more than 1 alcoholic drink per day for women as per

## 2025-01-25 NOTE — ED PROVIDER NOTES
EMERGENCY DEPARTMENT PHYSICIAN NOTE     Patient: Gaby Stroud     Time of Service: 2025  2:52 AM     Chief complaint:   Chief Complaint   Patient presents with    Extremity Weakness     Lkw 0800 yesterday        HISTORY:  Patient is a 65 y.o. female who presents to the emergency department with complaints of extremity weakness.       Past Medical History:   Diagnosis Date    Psychiatric disorder     depresssion         Past Surgical History:   Procedure Laterality Date    BRAIN ANEURYSM SURGERY      BREAST BIOPSY Right     neg    BREAST BIOPSY Left     X 3; all neg    GYN              Family History   Problem Relation Age of Onset    Breast Cancer Mother 63        X 2    Neuropathy Mother     Heart Disease Father     Cancer Father     Hypertension Father     Headache Son         Social History     Socioeconomic History    Marital status:      Spouse name: None    Number of children: None    Years of education: None    Highest education level: None   Tobacco Use    Smoking status: Former    Smokeless tobacco: Never   Vaping Use    Vaping status: Never Used   Substance and Sexual Activity    Alcohol use: Yes    Drug use: No        Current Medications: Reviewed in chart.    Allergies:   Allergies   Allergen Reactions    Codeine Other (See Comments)     Bad dreams    Sulfa Antibiotics Hives          REVIEW OF SYSTEMS: See HPI for pertinent positives and negatives.      PHYSICAL EXAM:  BP (!) 160/108   Pulse 92   Temp 98.1 °F (36.7 °C) (Oral)   Resp 20   Ht 1.575 m (5' 2\")   Wt 58.5 kg (129 lb)   SpO2 94%   BMI 23.59 kg/m²    Physical Exam  Vitals and nursing note reviewed.   Constitutional:       General: She is not in acute distress.     Appearance: Normal appearance. She is normal weight. She is not toxic-appearing.   HENT:      Head: Normocephalic and atraumatic.      Nose: Nose normal.      Mouth/Throat:      Mouth: Mucous membranes are moist.      Pharynx: Oropharynx is clear.  1/25/25 0326)   iopamidol (ISOVUE-370) 76 % injection 100 mL (40 mLs IntraVENous Given 1/25/25 0325)       Differential Diagnosis included but not limited to:  Weakness including but not limited to electrolyte abnormality, depression, anxiety, CVA, spinal cord abnormality, and infectious causes.    Medical Decision Making  The patient was placed into an examination in room.    Nursing notes were reviewed.    The patient was interviewed and an examination was completed by me with the above findings.    MDM:    This is a 65-year-old female presents to the ED for complaint of difficulty coordination of her upper extremities, and right leg weakness and numbness.  Patient dates she woke up this morning normal, and at 8 AM while at work she was found to have difficulty with coordination of her hands.  She says she woke up just prior to arrival to go to the bathroom, and had weakness and numbness in her right leg.  She states she had to let herself down to the floor so she would not fall.  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  On physical examination patient has weakness and numbness of the right lower extremity.  No other focal on examination.  Code stroke level 2 called on the patient.  Teleneurology was consulted.  Teleneurology did recommend admission for CVA rule out.    CT of the head did not show any acute findings per  Lab work returned without acute actionable items.   I did recommend admission the patient was agreeable with plan.      Patient care was discussed with Dr. Chen for admission via perfect serve.    Perfect Serve Consult for Admission  4:15 AM    ED Room Number: ER18/18  Patient Name and age:  Gaby Stroud 65 y.o.  female  Working Diagnosis: Right leg weakness  (primary encounter diagnosis)  Right leg numbness    COVID-19 Suspicion: No  Sepsis present:  No  Reassessment needed: No  Code Status:  Full

## 2025-01-26 ENCOUNTER — APPOINTMENT (OUTPATIENT)
Facility: HOSPITAL | Age: 65
DRG: 041 | End: 2025-01-26
Attending: STUDENT IN AN ORGANIZED HEALTH CARE EDUCATION/TRAINING PROGRAM
Payer: COMMERCIAL

## 2025-01-26 ENCOUNTER — APPOINTMENT (OUTPATIENT)
Facility: HOSPITAL | Age: 65
DRG: 041 | End: 2025-01-26
Payer: COMMERCIAL

## 2025-01-26 PROBLEM — R29.898 RIGHT LEG WEAKNESS: Status: ACTIVE | Noted: 2025-01-26

## 2025-01-26 LAB
AMMONIA PLAS-SCNC: 30 UMOL/L
ANION GAP SERPL CALC-SCNC: 5 MMOL/L (ref 2–12)
BASOPHILS # BLD: 0.03 K/UL (ref 0–0.1)
BASOPHILS NFR BLD: 0.6 % (ref 0–1)
BUN SERPL-MCNC: 17 MG/DL (ref 6–20)
BUN/CREAT SERPL: 24 (ref 12–20)
CALCIUM SERPL-MCNC: 9 MG/DL (ref 8.5–10.1)
CHLORIDE SERPL-SCNC: 107 MMOL/L (ref 97–108)
CHOLEST SERPL-MCNC: 214 MG/DL
CO2 SERPL-SCNC: 27 MMOL/L (ref 21–32)
CREAT SERPL-MCNC: 0.72 MG/DL (ref 0.55–1.02)
DIFFERENTIAL METHOD BLD: ABNORMAL
ECHO BSA: 1.6 M2
ECHO EST RA PRESSURE: 3 MMHG
ECHO LV EDV A2C: 68 ML
ECHO LV EDV A4C: 60 ML
ECHO LV EDV BP: 65 ML (ref 56–104)
ECHO LV EDV INDEX A4C: 37 ML/M2
ECHO LV EDV INDEX BP: 40 ML/M2
ECHO LV EDV NDEX A2C: 42 ML/M2
ECHO LV EJECTION FRACTION A2C: 61 %
ECHO LV EJECTION FRACTION A4C: 60 %
ECHO LV EJECTION FRACTION BIPLANE: 58 % (ref 55–100)
ECHO LV ESV A2C: 27 ML
ECHO LV ESV A4C: 24 ML
ECHO LV ESV BP: 27 ML (ref 19–49)
ECHO LV ESV INDEX A2C: 17 ML/M2
ECHO LV ESV INDEX A4C: 15 ML/M2
ECHO LV ESV INDEX BP: 17 ML/M2
ECHO PV MAX VELOCITY: 0.9 M/S
ECHO PV PEAK GRADIENT: 3 MMHG
ECHO RIGHT VENTRICULAR SYSTOLIC PRESSURE (RVSP): 26 MMHG
ECHO TV REGURGITANT MAX VELOCITY: 2.4 M/S
ECHO TV REGURGITANT PEAK GRADIENT: 23 MMHG
EKG ATRIAL RATE: 82 BPM
EKG DIAGNOSIS: NORMAL
EKG P AXIS: 71 DEGREES
EKG P-R INTERVAL: 158 MS
EKG Q-T INTERVAL: 394 MS
EKG QRS DURATION: 74 MS
EKG QTC CALCULATION (BAZETT): 460 MS
EKG R AXIS: 64 DEGREES
EKG T AXIS: 61 DEGREES
EKG VENTRICULAR RATE: 82 BPM
EOSINOPHIL # BLD: 0.02 K/UL (ref 0–0.4)
EOSINOPHIL NFR BLD: 0.4 % (ref 0–7)
ERYTHROCYTE [DISTWIDTH] IN BLOOD BY AUTOMATED COUNT: 13.2 % (ref 11.5–14.5)
EST. AVERAGE GLUCOSE BLD GHB EST-MCNC: 103 MG/DL
GLUCOSE BLD STRIP.AUTO-MCNC: 118 MG/DL (ref 65–117)
GLUCOSE SERPL-MCNC: 107 MG/DL (ref 65–100)
HBA1C MFR BLD: 5.2 % (ref 4–5.6)
HCT VFR BLD AUTO: 38.8 % (ref 35–47)
HDLC SERPL-MCNC: 92 MG/DL
HDLC SERPL: 2.3 (ref 0–5)
HGB BLD-MCNC: 12.8 G/DL (ref 11.5–16)
IMM GRANULOCYTES # BLD AUTO: 0.01 K/UL (ref 0–0.04)
IMM GRANULOCYTES NFR BLD AUTO: 0.2 % (ref 0–0.5)
LDLC SERPL CALC-MCNC: 107.8 MG/DL (ref 0–100)
LYMPHOCYTES # BLD: 1.42 K/UL (ref 0.8–3.5)
LYMPHOCYTES NFR BLD: 27.9 % (ref 12–49)
MCH RBC QN AUTO: 31.9 PG (ref 26–34)
MCHC RBC AUTO-ENTMCNC: 33 G/DL (ref 30–36.5)
MCV RBC AUTO: 96.8 FL (ref 80–99)
MONOCYTES # BLD: 0.71 K/UL (ref 0–1)
MONOCYTES NFR BLD: 13.9 % (ref 5–13)
NEUTS SEG # BLD: 2.9 K/UL (ref 1.8–8)
NEUTS SEG NFR BLD: 57 % (ref 32–75)
NRBC # BLD: 0 K/UL (ref 0–0.01)
NRBC BLD-RTO: 0 PER 100 WBC
PLATELET # BLD AUTO: 301 K/UL (ref 150–400)
PMV BLD AUTO: 9.5 FL (ref 8.9–12.9)
POTASSIUM SERPL-SCNC: 3.6 MMOL/L (ref 3.5–5.1)
RBC # BLD AUTO: 4.01 M/UL (ref 3.8–5.2)
SERVICE CMNT-IMP: ABNORMAL
SODIUM SERPL-SCNC: 139 MMOL/L (ref 136–145)
TRIGL SERPL-MCNC: 71 MG/DL
VLDLC SERPL CALC-MCNC: 14.2 MG/DL
WBC # BLD AUTO: 5.1 K/UL (ref 3.6–11)

## 2025-01-26 PROCEDURE — APPNB60 APP NON BILLABLE TIME 46-60 MINS: Performed by: NURSE PRACTITIONER

## 2025-01-26 PROCEDURE — 93325 DOPPLER ECHO COLOR FLOW MAPG: CPT | Performed by: INTERNAL MEDICINE

## 2025-01-26 PROCEDURE — 80048 BASIC METABOLIC PNL TOTAL CA: CPT

## 2025-01-26 PROCEDURE — 80061 LIPID PANEL: CPT

## 2025-01-26 PROCEDURE — 2580000003 HC RX 258: Performed by: STUDENT IN AN ORGANIZED HEALTH CARE EDUCATION/TRAINING PROGRAM

## 2025-01-26 PROCEDURE — 93321 DOPPLER ECHO F-UP/LMTD STD: CPT | Performed by: INTERNAL MEDICINE

## 2025-01-26 PROCEDURE — 93010 ELECTROCARDIOGRAM REPORT: CPT | Performed by: INTERNAL MEDICINE

## 2025-01-26 PROCEDURE — C8924 2D TTE W OR W/O FOL W/CON,FU: HCPCS

## 2025-01-26 PROCEDURE — 70498 CT ANGIOGRAPHY NECK: CPT

## 2025-01-26 PROCEDURE — 6370000000 HC RX 637 (ALT 250 FOR IP): Performed by: FAMILY MEDICINE

## 2025-01-26 PROCEDURE — 2060000000 HC ICU INTERMEDIATE R&B

## 2025-01-26 PROCEDURE — 82962 GLUCOSE BLOOD TEST: CPT

## 2025-01-26 PROCEDURE — 85025 COMPLETE CBC W/AUTO DIFF WBC: CPT

## 2025-01-26 PROCEDURE — 99223 1ST HOSP IP/OBS HIGH 75: CPT | Performed by: PSYCHIATRY & NEUROLOGY

## 2025-01-26 PROCEDURE — 82140 ASSAY OF AMMONIA: CPT

## 2025-01-26 PROCEDURE — 83036 HEMOGLOBIN GLYCOSYLATED A1C: CPT

## 2025-01-26 PROCEDURE — 0042T CT BRAIN PERFUSION: CPT

## 2025-01-26 PROCEDURE — 70551 MRI BRAIN STEM W/O DYE: CPT

## 2025-01-26 PROCEDURE — 4A03X5D MEASUREMENT OF ARTERIAL FLOW, INTRACRANIAL, EXTERNAL APPROACH: ICD-10-PCS | Performed by: INTERNAL MEDICINE

## 2025-01-26 PROCEDURE — 92523 SPEECH SOUND LANG COMPREHEN: CPT

## 2025-01-26 PROCEDURE — 70450 CT HEAD/BRAIN W/O DYE: CPT

## 2025-01-26 PROCEDURE — 36415 COLL VENOUS BLD VENIPUNCTURE: CPT

## 2025-01-26 PROCEDURE — 93308 TTE F-UP OR LMTD: CPT | Performed by: INTERNAL MEDICINE

## 2025-01-26 PROCEDURE — 6370000000 HC RX 637 (ALT 250 FOR IP): Performed by: STUDENT IN AN ORGANIZED HEALTH CARE EDUCATION/TRAINING PROGRAM

## 2025-01-26 PROCEDURE — 6360000004 HC RX CONTRAST MEDICATION: Performed by: STUDENT IN AN ORGANIZED HEALTH CARE EDUCATION/TRAINING PROGRAM

## 2025-01-26 PROCEDURE — 6360000004 HC RX CONTRAST MEDICATION: Performed by: NURSE PRACTITIONER

## 2025-01-26 PROCEDURE — 2500000003 HC RX 250 WO HCPCS: Performed by: FAMILY MEDICINE

## 2025-01-26 RX ORDER — IOPAMIDOL 755 MG/ML
100 INJECTION, SOLUTION INTRAVASCULAR
Status: COMPLETED | OUTPATIENT
Start: 2025-01-26 | End: 2025-01-26

## 2025-01-26 RX ORDER — SODIUM CHLORIDE 9 MG/ML
INJECTION, SOLUTION INTRAVENOUS CONTINUOUS
Status: DISPENSED | OUTPATIENT
Start: 2025-01-26 | End: 2025-01-27

## 2025-01-26 RX ORDER — AMLODIPINE BESYLATE 5 MG/1
5 TABLET ORAL DAILY
Status: DISCONTINUED | OUTPATIENT
Start: 2025-01-26 | End: 2025-01-28

## 2025-01-26 RX ADMIN — IOPAMIDOL 40 ML: 755 INJECTION, SOLUTION INTRAVENOUS at 08:47

## 2025-01-26 RX ADMIN — SULFUR HEXAFLUORIDE 2 ML: KIT at 14:10

## 2025-01-26 RX ADMIN — SODIUM CHLORIDE, PRESERVATIVE FREE 10 ML: 5 INJECTION INTRAVENOUS at 11:01

## 2025-01-26 RX ADMIN — VENLAFAXINE HYDROCHLORIDE 150 MG: 150 CAPSULE, EXTENDED RELEASE ORAL at 11:01

## 2025-01-26 RX ADMIN — SODIUM CHLORIDE: 9 INJECTION, SOLUTION INTRAVENOUS at 10:45

## 2025-01-26 RX ADMIN — AMLODIPINE BESYLATE 5 MG: 5 TABLET ORAL at 14:35

## 2025-01-26 RX ADMIN — Medication 1 MG: at 11:00

## 2025-01-26 RX ADMIN — IOPAMIDOL 80 ML: 755 INJECTION, SOLUTION INTRAVENOUS at 08:46

## 2025-01-26 RX ADMIN — ASPIRIN 81 MG CHEWABLE TABLET 81 MG: 81 TABLET CHEWABLE at 11:00

## 2025-01-26 RX ADMIN — Medication 100 MG: at 11:00

## 2025-01-26 RX ADMIN — THERA TABS 1 TABLET: TAB at 11:00

## 2025-01-26 NOTE — PROGRESS NOTES
65 y.o. left-handed female with past medical history significant for depression, alcoholism, and previous subarachnoid hemorrhage due to rupture of ACOM aneurysm status post coil embolization on 7/3/2024 by Dr. Stokes who presented to the ED on the early morning of 1/25/2024 with complaints of bilateral hand incoordination and bilateral leg weakness. MRI Brain has confirmed an evolving subacute L basal ganglia possible anterior choroidal infarction     Plan:     - continue aspirin 81 mg daily for stroke prevention  -  - High dose statin   - ECHO Pending   -  Hgb A1C  - PT/OT/SLP evals  - Normotension  - Normoglycemia  - Monitor on telemetry - anticipating will need some sort of heart monitor on discharge to look for any pAF   - patient on CIWA protocol, scheduled thiamine, folic acid, and multivitamin  - Etoh cessation   - Educated patient and patient's family on stroke-like symptoms (BEFAST) and instructed to call 911 immediately. Also informed to seek ER services if she experiences the worst headache of her life

## 2025-01-26 NOTE — PLAN OF CARE
Problem: Physical Therapy - Adult  Goal: By Discharge: Performs mobility at highest level of function for planned discharge setting.  See evaluation for individualized goals.  Description: FUNCTIONAL STATUS PRIOR TO ADMISSION: Patient was independent and active without use of DME. Returned to working and driving.    HOME SUPPORT PRIOR TO ADMISSION: The patient lived alone with family to provide assistance if/as needed. PMHx significant for SAH 2* ACOM aneurysm rupture.     Physical Therapy Goals  Initiated 1/25/2025  1.  Patient will move from supine to sit and sit to supine and scoot up and down in bed with independence within 7 day(s).    2.  Patient will perform sit to stand with independence within 7 day(s).  3.  Patient will transfer from bed to chair and chair to bed with independence using the least restrictive device within 7 day(s).  4.  Patient will ambulate with modified independence for 300 feet with the least restrictive device within 7 day(s).   5.  Patient will ascend/descend 12 stairs with handrail(s) with modified independence within 7 day(s).  6.  Patient will improve Bhakta Balance score by 7 points within 7 days.    1/25/2025 1503 by Nichole Turcios, PT  Outcome: Progressing     Problem: Occupational Therapy - Adult  Goal: By Discharge: Performs self-care activities at highest level of function for planned discharge setting.  See evaluation for individualized goals.  Description: FUNCTIONAL STATUS PRIOR TO ADMISSION:  Patient was ambulatory using no DME. Working full time and independent in her self-care.    HOME SUPPORT: Patient lived alone with family in the area for help as needed. PMHx significant for SAH 2* ACOM aneurysm rupture    Occupational Therapy Goals:  Initiated 1/25/2025  1.  Patient will perform bathing with Modified [place] within 7 day(s).  2.  Patient will perform upper body dressing with Modified [place] within 7 day(s).  3.  Patient will perform lower body dressing  with Modified [place] within 7 day(s).  4.  Patient will perform toilet transfers with Modified [place]  within 7 day(s).  5.  Patient will perform all aspects of toileting with Modified [place] within 7 day(s).  6.  Patient will participate in upper extremity therapeutic exercise/activities with Modified [place] for 15 minutes within 7 day(s).    7.  Patient will utilize energy conservation techniques during functional activities with verbal cues within 7 day(s).    1/25/2025 1520 by Yu Rebolledo OT  Outcome: Progressing     Problem: Discharge Planning  Goal: Discharge to home or other facility with appropriate resources  1/26/2025 0005 by Jaylin Shine RN  Outcome: Progressing  1/25/2025 2006 by Fabi Madera RN  Outcome: Progressing  Flowsheets  Taken 1/25/2025 2000 by Jaylin Shine RN  Discharge to home or other facility with appropriate resources:   Identify barriers to discharge with patient and caregiver   Arrange for needed discharge resources and transportation as appropriate   Identify discharge learning needs (meds, wound care, etc)   Arrange for interpreters to assist at discharge as needed   Refer to discharge planning if patient needs post-hospital services based on physician order or complex needs related to functional status, cognitive ability or social support system  Taken 1/25/2025 1830 by Fabi Madera RN  Discharge to home or other facility with appropriate resources: Identify barriers to discharge with patient and caregiver     Problem: Pain  Goal: Verbalizes/displays adequate comfort level or baseline comfort level  1/26/2025 0005 by Jaylin Shine RN  Outcome: Progressing  1/25/2025 2006 by Fabi Madera RN  Outcome: Progressing     Problem: Safety - Adult  Goal: Free from fall injury  1/26/2025 0005 by Jaylin Shine RN  Outcome: Progressing  1/25/2025 2006 by Fabi Madera RN  Outcome: Progressing  Flowsheets (Taken 1/25/2025 2000 by Rl

## 2025-01-26 NOTE — CARE COORDINATION
Care Management Initial Assessment       RUR:  7%  Readmission? No    JESI: Pt admitted from home, will likely return when stable  - HH vs. OP (pending Pt progress and imaging results)    Transport: family confirmed    CM met with Pt and Pt family members at bedside to introduce self and role. Pt lives alone in a a 2 story home w/ 2 MARGARETTE.     ADLs: independent - works and drives   DME: none  PCP follow up: PCP confirmed - seen in December   Previous Home Health: none  Previous Skilled Nursing Facility: none  Previous Inpatient Rehab: none  Insurance verified: yes; [facility]   Pharmacy: [facility] on [address]  Emergency Contact:   Adam Stroud (Child)  [phone] (Mobile)    1030am: PT OT recommendation in progress (HH vs. OP, pending progress and imaging results). CM to follow when recs are solidified. Pt endorses being open to OP PT w/ [facility] and wants to continue if that is recommended.     The program assesses the family and/or caregiver's readiness, willingness, and ability to provide or support and self-management activities for the patient as needed.    CM will follow patient progress and assist as needed with JESI plan.       01/26/25 1022   Service Assessment   Patient Orientation Alert and Oriented;Person;Place;Situation;Self   Cognition Alert   History Provided By Patient   Primary Caregiver Self   Accompanied By/Relationship family members   Support Systems Family Members;Children   Patient's Healthcare Decision Maker is: Legal Next of Kin   PCP Verified by CM Yes   Last Visit to PCP Within last 3 months   Prior Functional Level Independent in ADLs/IADLs   Current Functional Level Independent in ADLs/IADLs   Can patient return to prior living arrangement Yes   Ability to make needs known: Good   Family able to assist with home care needs: Yes   Social/Functional History   Lives With Alone   Type of Home House   Home Layout Two level   Home Access Stairs to enter with rails   Entrance Stairs - Number of Steps 2

## 2025-01-26 NOTE — PROGRESS NOTES
[facility] Adult  Hospitalist Group                                                                                          Hospitalist Progress Note  Estelle Wheeler MD  Answering service: [phone] OR [phone] from in house phone        Date of Service:  2025  NAME:  Gaby Stroud  :  1960  MRN:  [id number]       Admission Summary:   HPI: \"Gaby Stroud is a 65 y.o. female with past medical history of depression presented to the ED with chief complaint of right leg weakness, inability ambulate, and incoordination of both hands.  Patient reportedly was last known well at about 0800 hrs. on 2025.  At that time, patient had onset of hand coordination.  Patient states \"could not use computer at work\".  Symptoms notedly remain constant, without pacifically bating factors.  Tonight at 2300 hrs., she reportedly went to sleep.  When she awoke this morning on 2025 at 0200 hrs., she reported weakness in her right leg and was unable to ambulate.  He decided come to emergency department.  There is no reports of head and neck trauma, loss of consciousness, loss of slurred speech, or facial droop.  On arrival emergency department, initial reported vital signs were blood pressure 172/90, heart rate 87, respiratory rate 18, O2 saturation 94% on room air.  12-lead EKG showed normal sinus rhythm at 82 bpm.  CT head without IV contrast showed no acute intracranial abnormalities.  CTA head and neck with IV contrast showed previous embolization of ACOM aneurysm but no large vessel occlusion, no perfusion defect.  Patient was seen by ED attending physician, neuro critical care NP, and teleneurologist in consultation.  Patient is now seen for admission to the hospitalist service.        Of note, patient has prior hospitalization from 2024 to 2024 with subarachnoid hemorrhage secondary to ruptured ACOM aneurysm.  On 7/3/2024, underwent endovascular coil embolization of ACOM complex

## 2025-01-26 NOTE — PLAN OF CARE
Problem: Discharge Planning  Goal: Discharge to home or other facility with appropriate resources  Outcome: Progressing  Flowsheets (Taken 1/25/2025 7350)  Discharge to home or other facility with appropriate resources: Identify barriers to discharge with patient and caregiver     Problem: Pain  Goal: Verbalizes/displays adequate comfort level or baseline comfort level  Outcome: Progressing     Problem: Safety - Adult  Goal: Free from fall injury  Outcome: Progressing     Problem: Chronic Conditions and Co-morbidities  Goal: Patient's chronic conditions and co-morbidity symptoms are monitored and maintained or improved  Outcome: Progressing

## 2025-01-26 NOTE — PROGRESS NOTES
Neurocritical Care Code Stroke Documentation      Symptoms:  Code stroke was called due to worsening right-sided weakness.  Patient also noted to have worsening facial droop on the right and slurred speech.  I saw the patient yesterday for neurology consultation and symptoms on the right side have worsened compared to my exam yesterday.  Reportedly, a code stroke was called last night for a right facial droop but it was canceled as patient has had this before.    Blood glucose 118   /96   Last Known Well: Unclear, last documented exam was 0400 am with no change at 2200 last night    Medical hx: depression, alcoholism, and previous subarachnoid hemorrhage due to rupture of ACOM aneurysm status post coil embolization on 7/3/2024 by Dr. Stokes.  Patient initially presented to the ED as a stroke alert on 2024 with complaints of bilateral hand incoordination and bilateral leg weakness.  She also complained of some difficulty getting words out.  Initial CT imaging did not show anything acute.  Patient is being admitted for stroke evaluation.  She has not had her MRI of the brain yet.  Past Medical History:   Diagnosis Date    Psychiatric disorder     depresssion       Anticoagulation: On aspirin 81 mg daily    VAN:   Negative   NIHSS:   1a-LOC:0    1b-Month/Age:0    1c-Open/Close Hand:0    2-Best Gaze:1 (right partial gaze palsy)    3-Visual Fields:0    4-Facial Palsy:2    5a-Left Arm:0    5b-Right Arm:1    6a-Left Le    6b-Right Le    7-Limb Ataxia:    8-Sensory:1 (right leg sensory changes)    9-Best Language:0    10-Dysarthria:1    11-Extinction/Inattention:0  TOTAL SCORE:7   Imaging:   CTIMPRESSION:  CT Head:  Small acute infarct in the left corona radiata/basal ganglia. No evidence of acute hemorrhage.     CTA Head:  No evidence of significant stenosis.  Stable postprocedural changes of anterior communicating artery aneurysm coil embolization, with no obvious recurrent aneurysm.  CTA Neck:  No  evidence of significant stenosis.  CT Brain Perfusion:  Qualitative hypoperfusion in the left corona radiata/basal ganglia corresponds to the acute infarct seen as above. This is not quantitatively detected.     Plan:   TNK Candidate: NO    Mechanical thrombectomy Candidate: NO   No LVO seen on imaging after discussion with Dr. Hassan with [facility]. Patient to go for MRI after CT scans. Continue aspirin for now.    Discussed with Teletessy, Dr. Wheeler, Dr. Braun with inpatient Neurology and patient's family.     Recommend IV fluids and permissive HTN- Discussed with Dr. Wheeler.     Arrival time: 0813  Time spent: 60 minutes.     JEANETTE Green - NP

## 2025-01-26 NOTE — PROGRESS NOTES
2214: Code stroke called on pt. RN assessment noticed L droop/significant ULE ataxia with weakness. Pt vitals stable; .  2225: Per neurology Code Stroke cancelled; pt still awaiting MRI. Pt stable at this time.

## 2025-01-26 NOTE — PLAN OF CARE
Problem: Discharge Planning  Goal: Discharge to home or other facility with appropriate resources  1/26/2025 1204 by Vandana Sweet RN  Outcome: Progressing  1/26/2025 1139 by Vandana Sweet RN  Outcome: Progressing  1/26/2025 0005 by Jaylin Shine RN  Outcome: Progressing     Problem: Pain  Goal: Verbalizes/displays adequate comfort level or baseline comfort level  1/26/2025 1204 by Vandana Sweet RN  Outcome: Progressing  1/26/2025 1139 by Vandana Sweet RN  Outcome: Progressing  Flowsheets (Taken 1/26/2025 0800)  Verbalizes/displays adequate comfort level or baseline comfort level:   Encourage patient to monitor pain and request assistance   Assess pain using appropriate pain scale   Administer analgesics based on type and severity of pain and evaluate response   Implement non-pharmacological measures as appropriate and evaluate response   Consider cultural and social influences on pain and pain management   Notify Licensed Independent Practitioner if interventions unsuccessful or patient reports new pain  1/26/2025 0005 by Jaylin Shine RN  Outcome: Progressing     Problem: Safety - Adult  Goal: Free from fall injury  1/26/2025 1204 by Vandana Sweet RN  Outcome: Progressing  1/26/2025 1139 by Vandana Sweet RN  Outcome: Progressing  1/26/2025 0005 by Jaylin Shine RN  Outcome: Progressing     Problem: Chronic Conditions and Co-morbidities  Goal: Patient's chronic conditions and co-morbidity symptoms are monitored and maintained or improved  1/26/2025 0005 by Jaylin Shine RN  Outcome: Progressing

## 2025-01-26 NOTE — PLAN OF CARE
Social/Functional History  Lives With: Alone  Type of Home: House  Home Layout: Two level  Home Access: Stairs to enter with rails  Entrance Stairs - Number of Steps: 2  Entrance Stairs - Rails: None  Bathroom Shower/Tub: None  Bathroom Equipment: None  Home Equipment: None  Prior Level of Assist for ADLs: Independent  Prior Level of Assist for Homemaking: Independent  Homemaking Responsibilities: Yes  Prior Level of Assist for Ambulation: Independent community ambulator, with or without device  Prior Level of Assist for Transfers: Independent  Active : Yes  Mode of Transportation: Car  Occupation: Full time employment  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   Oral Assessment:   Severe R facial weakness  Min deviation of her tongue to the R  Good range but slow rate       Motor Speech:  Motor Speech  Apraxic Characteristics: None  Dysarthric Characteristics: Blended word boundaries;Imprecise;Decreased breath support  Intelligibility: Impaired  Sentence Intelligibility (%): 80 %  Conversation Intelligibility (%): 70 %  Overall Impairment Severity: Mild      Language Comprehension and Expression:  Auditory Comprehension  Comprehension: Within Functional Limits (R/L intact on self, 100% with complex y/n and 3 step commands.)     Expression  Primary Mode of Expression: Verbal  Verbal Expression  Verbal Expression: Exceptions to functional limits  Initiation: WFL  Confrontation: Mild  Divergent: Mild  Conversation: Mild   Mild word finding in conversation     Mildly reduced procedural language                     Outcome Measure:  Functional Oral Intake Scale (FOIS): 7--Total oral diet with no restrictions    After treatment:    Patient left in no apparent distress in bed    COMMUNICATION/EDUCATION:   Patient was educated regarding strategies for language and motor speech .  She demonstrated Good understanding as evidenced by Nodding. No family present     The patient's plan of care including recommendations, planned interventions, and recommended diet changes were discussed with: Registered nurse    Patient/family have participated as able in goal setting and plan of care    Thank you,  Nevin Clarke, SLP    SLP Individual Minutes  Time In: 1430  Time Out: 1455  Minutes: 25       Problem: SLP Adult - Impaired Communication  Goal: By Discharge: Demonstrates communication skills at highest level of function for planned discharge setting.  See evaluation for individualized goals.  Description: 1/26/2025  Speech path goals  1. Patient will state compensatory motor speech and word finding strategies with min cues.   2. Patient will produce sentence length utterances with 90% intelligibility with min cues.   3. Patient will use word finding strategies in conversation as needed with 80% effectiveness.   Outcome: Progressing

## 2025-01-26 NOTE — PLAN OF CARE
Problem: Discharge Planning  Goal: Discharge to home or other facility with appropriate resources  1/26/2025 1139 by Vandana Sweet RN  Outcome: Progressing  1/26/2025 0005 by Jaylin Shine RN  Outcome: Progressing     Problem: Pain  Goal: Verbalizes/displays adequate comfort level or baseline comfort level  1/26/2025 1139 by Vandana Sweet RN  Outcome: Progressing  1/26/2025 0005 by Jaylin Shine RN  Outcome: Progressing     Problem: Safety - Adult  Goal: Free from fall injury  1/26/2025 1139 by Vandana Sweet RN  Outcome: Progressing  1/26/2025 0005 by Jaylin Shine RN  Outcome: Progressing     Problem: Chronic Conditions and Co-morbidities  Goal: Patient's chronic conditions and co-morbidity symptoms are monitored and maintained or improved  1/26/2025 0005 by Jaylin Shine RN  Outcome: Progressing

## 2025-01-27 PROBLEM — I67.1 BRAIN ANEURYSM: Status: ACTIVE | Noted: 2025-01-27

## 2025-01-27 PROBLEM — I10 PRIMARY HYPERTENSION: Status: ACTIVE | Noted: 2025-01-27

## 2025-01-27 PROBLEM — I63.429 CEREBROVASCULAR ACCIDENT (CVA) DUE TO EMBOLISM OF ANTERIOR CEREBRAL ARTERY (HCC): Status: ACTIVE | Noted: 2025-01-27

## 2025-01-27 LAB
ANION GAP SERPL CALC-SCNC: 4 MMOL/L (ref 2–12)
BASOPHILS # BLD: 0.02 K/UL (ref 0–0.1)
BASOPHILS NFR BLD: 0.4 % (ref 0–1)
BUN SERPL-MCNC: 11 MG/DL (ref 6–20)
BUN/CREAT SERPL: 18 (ref 12–20)
CALCIUM SERPL-MCNC: 8.8 MG/DL (ref 8.5–10.1)
CHLORIDE SERPL-SCNC: 110 MMOL/L (ref 97–108)
CO2 SERPL-SCNC: 24 MMOL/L (ref 21–32)
CREAT SERPL-MCNC: 0.6 MG/DL (ref 0.55–1.02)
DIFFERENTIAL METHOD BLD: NORMAL
ECHO BSA: 1.6 M2
EOSINOPHIL # BLD: 0.05 K/UL (ref 0–0.4)
EOSINOPHIL NFR BLD: 1 % (ref 0–7)
ERYTHROCYTE [DISTWIDTH] IN BLOOD BY AUTOMATED COUNT: 13.6 % (ref 11.5–14.5)
GLUCOSE SERPL-MCNC: 110 MG/DL (ref 65–100)
HCT VFR BLD AUTO: 39.2 % (ref 35–47)
HGB BLD-MCNC: 12.6 G/DL (ref 11.5–16)
IMM GRANULOCYTES # BLD AUTO: 0.01 K/UL (ref 0–0.04)
IMM GRANULOCYTES NFR BLD AUTO: 0.2 % (ref 0–0.5)
LYMPHOCYTES # BLD: 1.41 K/UL (ref 0.8–3.5)
LYMPHOCYTES NFR BLD: 29.4 % (ref 12–49)
MCH RBC QN AUTO: 31.8 PG (ref 26–34)
MCHC RBC AUTO-ENTMCNC: 32.1 G/DL (ref 30–36.5)
MCV RBC AUTO: 99 FL (ref 80–99)
MONOCYTES # BLD: 0.62 K/UL (ref 0–1)
MONOCYTES NFR BLD: 12.9 % (ref 5–13)
NEUTS SEG # BLD: 2.69 K/UL (ref 1.8–8)
NEUTS SEG NFR BLD: 56.1 % (ref 32–75)
NRBC # BLD: 0 K/UL (ref 0–0.01)
NRBC BLD-RTO: 0 PER 100 WBC
PLATELET # BLD AUTO: 288 K/UL (ref 150–400)
PMV BLD AUTO: 9.4 FL (ref 8.9–12.9)
POTASSIUM SERPL-SCNC: 3.7 MMOL/L (ref 3.5–5.1)
RBC # BLD AUTO: 3.96 M/UL (ref 3.8–5.2)
SODIUM SERPL-SCNC: 138 MMOL/L (ref 136–145)
WBC # BLD AUTO: 4.8 K/UL (ref 3.6–11)

## 2025-01-27 PROCEDURE — 2709999900 HC NON-CHARGEABLE SUPPLY: Performed by: INTERNAL MEDICINE

## 2025-01-27 PROCEDURE — 80048 BASIC METABOLIC PNL TOTAL CA: CPT

## 2025-01-27 PROCEDURE — 97112 NEUROMUSCULAR REEDUCATION: CPT

## 2025-01-27 PROCEDURE — 97164 PT RE-EVAL EST PLAN CARE: CPT

## 2025-01-27 PROCEDURE — C1764 EVENT RECORDER, CARDIAC: HCPCS | Performed by: INTERNAL MEDICINE

## 2025-01-27 PROCEDURE — 0JH632Z INSERTION OF MONITORING DEVICE INTO CHEST SUBCUTANEOUS TISSUE AND FASCIA, PERCUTANEOUS APPROACH: ICD-10-PCS | Performed by: INTERNAL MEDICINE

## 2025-01-27 PROCEDURE — 92610 EVALUATE SWALLOWING FUNCTION: CPT

## 2025-01-27 PROCEDURE — 2060000000 HC ICU INTERMEDIATE R&B

## 2025-01-27 PROCEDURE — 33285 INSJ SUBQ CAR RHYTHM MNTR: CPT | Performed by: INTERNAL MEDICINE

## 2025-01-27 PROCEDURE — 6370000000 HC RX 637 (ALT 250 FOR IP): Performed by: STUDENT IN AN ORGANIZED HEALTH CARE EDUCATION/TRAINING PROGRAM

## 2025-01-27 PROCEDURE — 97530 THERAPEUTIC ACTIVITIES: CPT

## 2025-01-27 PROCEDURE — 99223 1ST HOSP IP/OBS HIGH 75: CPT | Performed by: INTERNAL MEDICINE

## 2025-01-27 PROCEDURE — 97168 OT RE-EVAL EST PLAN CARE: CPT

## 2025-01-27 PROCEDURE — 85025 COMPLETE CBC W/AUTO DIFF WBC: CPT

## 2025-01-27 PROCEDURE — 92507 TX SP LANG VOICE COMM INDIV: CPT

## 2025-01-27 PROCEDURE — 6370000000 HC RX 637 (ALT 250 FOR IP): Performed by: FAMILY MEDICINE

## 2025-01-27 PROCEDURE — 6360000002 HC RX W HCPCS: Performed by: INTERNAL MEDICINE

## 2025-01-27 PROCEDURE — 97535 SELF CARE MNGMENT TRAINING: CPT

## 2025-01-27 DEVICE — MONITOR CRD 1.4 CC 3.4 GM INSERTABLE LINQ: Type: IMPLANTABLE DEVICE | Status: FUNCTIONAL

## 2025-01-27 RX ADMIN — VENLAFAXINE HYDROCHLORIDE 150 MG: 150 CAPSULE, EXTENDED RELEASE ORAL at 09:36

## 2025-01-27 RX ADMIN — Medication 1 MG: at 09:36

## 2025-01-27 RX ADMIN — ASPIRIN 81 MG CHEWABLE TABLET 81 MG: 81 TABLET CHEWABLE at 09:36

## 2025-01-27 RX ADMIN — THERA TABS 1 TABLET: TAB at 09:36

## 2025-01-27 RX ADMIN — AMLODIPINE BESYLATE 5 MG: 5 TABLET ORAL at 09:36

## 2025-01-27 RX ADMIN — Medication 100 MG: at 09:36

## 2025-01-27 NOTE — PROGRESS NOTES
Spiritual Health History and Assessment/Progress Note  [facility]    Rituals, Rites and Sacraments,  ,  ,      Name: Gaby Stroud MRN: [id number]    Age: 65 y.o.     Sex: female   Language: English   Roman Catholic: Mosque   Stroke-like symptoms     Date: 1/27/2025            Total Time Calculated: 5 min              Spiritual Assessment began in [facility] 6S NEURO-SCI TELE        Referral/Consult From: Clergy/   Encounter Overview/Reason: Rituals, Rites and Sacraments  Service Provided For: Patient    Tamara, Belief, Meaning:   Patient is connected with a tamara tradition or spiritual practice  Family/Friends are connected with a tamara tradition or spiritual practice      Importance and Influence:  Patient has spiritual/personal beliefs that influence decisions regarding their health  Family/Friends have spiritual/personal beliefs that influence decisions regarding the patient's health    Community:  Patient is connected with a spiritual community  Family/Friends are connected with a spiritual community:    Assessment and Plan of Care:     Patient Interventions include: Provided sacramental/Faith ritual  Family/Friends Interventions include: Provided sacramental/Faith ritual    Patient Plan of Care: Spiritual Care available upon further referral  Family/Friends Plan of Care: Spiritual Care available upon further referral    Electronically signed by Chaplain JOCELYN on 1/27/2025 at 1:22 PM     [facility].  Mrs. Stroud was in bed. She is Mosque and attends [employer].  Prayer and communion offered.  She was able to recite the entire Our Father prayer along with the .  Her visitor declined communion today.     Sr. JOHN Ibarra, RN, ACSW, LCSW   Page:  [phone]

## 2025-01-27 NOTE — DISCHARGE INSTRUCTIONS
Loop Recorder (Linq) Discharge Instructions      Please make sure you have received your Temporary Loop Recorder identification card with your discharge instructions      MEDICATIONS        Take only the medications prescribed to you at discharge.    ACTIVITY        Return to your normal activity, except as noted below.    Avoid tight clothes or unnecessary pressure over your incision (such as bra straps or seat belts).  If it is tender or sensitive to clothing, cover the incision with a soft dressing or pad.  Questions about driving are individualized and should be discussed with one of the EP Physicians prior to discharge.    SHOWERING        Leave the bandage over your after the Loop Recorder implant.  You can remove your bandage in 7-14 days.     It is important to keep the bandaged area clean and dry.  You may shower around the site until the bandage is removed in clinic. Thereafter, you may shower after the bandage is removed, washing it gently with soap and water. Do not apply any lotions, powders, or perfumes to the incision line.    Avoid submerging your incision in water (tub baths, hot tubs, or swimming) for two weeks.         DISCHARGE PRECAUTIONS        Record your temperature every day, at the same time, until your follow up.  A temperature of 100.5 F, or higher, can be the first sign of infection.  This should be reported to your Doctor immediately.    Always tell your doctor or dentist that you have a Loop Recorder.  In some cases, antibiotics may be prescribed before certain procedures.    Your temporary identification will be given to you with these instructions.  Keep your device card in your wallet or on your person at all times.  You should receive your permanent card, although this may take up to 8-12 weeks.  If you do not receive your permanent card, please call the office at [phone] or the phone number provided on your temporary card for the loop recorder company.       SYMPTOMS THAT

## 2025-01-27 NOTE — PLAN OF CARE
Problem: Occupational Therapy - Adult  Goal: By Discharge: Performs self-care activities at highest level of function for planned discharge setting.  See evaluation for individualized goals.  Description: FUNCTIONAL STATUS PRIOR TO ADMISSION:  Patient was ambulatory using no DME. Working full time and independent in her self-care.    HOME SUPPORT: Patient lived alone with family in the area for help as needed. PMHx significant for SAH 2* ACOM aneurysm rupture    Occupational Therapy Goals:  Re-Evaluation 1/27/24 s/p acute CVA with significant functional decline, goals modified below  1.  Patient will perform self-feeding with Minimal Assist using RUE as GM/FM assist within 7 day(s).  2.  Patient will perform seated bathing with Moderate Assist within 7 day(s).  3.  Patient will perform upper body dressing with Moderate Assist within 7 day(s).  4.  Patient will perform lower body dressing with Moderate Assist within 7 day(s).  5.  Patient will perform toilet transfers to/from [facility] with Moderate Assist x2 within 7 day(s).  6.  Patient will perform all aspects of toileting with Moderate Assist within 7 day(s).  7.  Patient will participate in upper extremity therapeutic exercise/activities with Minimal Assist within 7 day(s).    8.  Patient will improve their Fugl Garnett score by 5 points in prep for ADLs within 7 days.    Initiated 1/25/2025  1.  Patient will perform bathing with Modified [place] within 7 day(s).  2.  Patient will perform upper body dressing with Modified [place] within 7 day(s).  3.  Patient will perform lower body dressing with Modified [place] within 7 day(s).  4.  Patient will perform toilet transfers with Modified [place]  within 7 day(s).  5.  Patient will perform all aspects of toileting with Modified [place] within 7 day(s).  6.  Patient will participate in upper extremity therapeutic exercise/activities with Modified [place] for 15 minutes within 7 day(s).    7.   Dorsiflexion: None  Repeated Dorsiflexion/ Volar Flexion: None  Circumduction: None  Wrist Total: 0    Hand  Mass Flexion: None  Mass Extension: None  Grasp A: None  Grasp B: None  Grasp C: None  Grasp D: None  Grasp E: None  Hand Total: 0    Coordination/Speed  Tremor: Marked  Dysmetria: Marked  Time: >5s  Coordination/Speed Total: 0    Total A-D  Total A-D (Motor Function): 5         This is a reliable/valid measure of arm function after a neurological event. It has established value to characterize functional status and for measuring spontaneous and therapy-induced recovery; tests proximal and distal motor functions. Fugl-Garnett Assessment - UE scores recorded between five and 30 days post neurologic event can be used to predict UE recovery at six months post neurologic event.  Severe = 0-21 points   Moderately Severe = 22-33 points   Moderate = 34-47 points   Mild = 48-66 points  WESLEY Adams, ANNA Martinez, ESA Preston, BALBIR Marley, & ALBER Alegria (1992). Measurement of motor recovery after stroke: Outcome assessment and sample size requirements. Stroke, 23, pp. 4337-0573.   --------------------------------------------------------------------------------------------------------------------------------------------------------------------  MCID:  Stroke:   (Fco et al, 2001; n = 171; mean age 70 (11) years; assessed within 17 (12) days of stroke, Acute Stroke)  FMA Motor Scores from Admission to Discharge   10 point increase in FMA Upper Extremity = 1.5 change in discharge FIM   10 point increase in FMA Lower Extremity = 1.9 change in discharge FIM  MDC:   Stroke:   (Donell et al, 2008, n = 14, mean age = 59.9 (14.6) years, assessed on average 14 (6.5) months post stroke, Chronic Stroke)   FMA = 5.2 points for the Upper Extremity portion of the assessment         [facility] AM-PACTM \"6 Clicks\"                                                       Daily Activity Inpatient Short Form  How much help from  Private car

## 2025-01-27 NOTE — PROGRESS NOTES
Occupational Therapy  01/27/25    Chart reviewed and patient re-evaluated by occupational therapy. Pending progression with skilled acute occupational therapy, recommend:    High intensity/comprehensive skilled occupational therapy in a multidisciplinary setting as patient is working towards tolerating up to 3 hours of therapy/day 5-7x/week    Recommend with nursing patient to complete as able in order to maintain strength, endurance and independence: OOB to chair 3x/day via stand pivot to the L with 2 assist, ADLs with assist, and performing toileting to BSC with 2 assist to the L. Thank you for your assistance.     Full evaluation to follow.     Thank you,   EMIL Person, OTR/L

## 2025-01-27 NOTE — PROGRESS NOTES
Speech Therapy Contact Note    Chart reviewed. Patient completing procedure at time of attempted session. Will follow-up as able/appropriate.     Ju Feliciano, CCC-SLP

## 2025-01-27 NOTE — PLAN OF CARE
Problem: Discharge Planning  Goal: Discharge to home or other facility with appropriate resources  1/26/2025 2327 by Nubia Nolen LPN  Outcome: Progressing  Flowsheets (Taken 1/26/2025 2000)  Discharge to home or other facility with appropriate resources:   Identify barriers to discharge with patient and caregiver   Arrange for needed discharge resources and transportation as appropriate   Identify discharge learning needs (meds, wound care, etc)   Refer to discharge planning if patient needs post-hospital services based on physician order or complex needs related to functional status, cognitive ability or social support system  1/26/2025 1204 by Vandana Sweet RN  Outcome: Progressing  1/26/2025 1139 by Vandana Sweet RN  Outcome: Progressing  Flowsheets (Taken 1/26/2025 0800)  Discharge to home or other facility with appropriate resources:   Identify barriers to discharge with patient and caregiver   Arrange for needed discharge resources and transportation as appropriate   Identify discharge learning needs (meds, wound care, etc)   Arrange for interpreters to assist at discharge as needed   Refer to discharge planning if patient needs post-hospital services based on physician order or complex needs related to functional status, cognitive ability or social support system     Problem: Pain  Goal: Verbalizes/displays adequate comfort level or baseline comfort level  1/26/2025 2327 by Nubia Nolen LPN  Outcome: Progressing  1/26/2025 1204 by Vandana Sweet RN  Outcome: Progressing  1/26/2025 1139 by Vandana Sweet RN  Outcome: Progressing  Flowsheets (Taken 1/26/2025 0800)  Verbalizes/displays adequate comfort level or baseline comfort level:   Encourage patient to monitor pain and request assistance   Assess pain using appropriate pain scale   Administer analgesics based on type and severity of pain and evaluate response   Implement non-pharmacological measures as appropriate and evaluate response   Consider

## 2025-01-27 NOTE — CARE COORDINATION
Transition of Care Plan:    [facility]   - auth initiated 1/27  -PM&R following    Transport: BLS    RUR:  13%  Prior Level of Functioning: independent - works and drives  Disposition:  IPR  ANGELA:  1/30  If SNF or IPR: Date FOC offered: 1/27  Date FOC received: 1/27  Accepting facility:   Date authorization started with reference number:   Date authorization received and expires:   Follow up appointments:  PCP  DME needed:  defer to IPR  Transportation at discharge: BLS likely  IM/IMM Medicare/ letter given: n/a  Caregiver Contact:   Adam Stroud (Child)  [phone] (Mobile)   Discharge Caregiver contacted prior to discharge? yes  Care Conference needed? no  Barriers to discharge: medical     12pm: PT OT Attending recommending IPR at [place]. CM shared recommendation w/ Pt and family who are in agreement and prefer [facility]. Referral sent on [facility], PM&R consulted.     315pm: [facility] can accept Pt. Auth initiated 1/27.      01/27/25 1215   Condition of Participation: Discharge Planning   The Plan for Transition of Care is related to the following treatment goals: IPR   The Patient and/or Patient Representative was provided with a Choice of Provider? Patient   The Patient and/Or Patient Representative agree with the Discharge Plan? Yes   Freedom of Choice list was provided with basic dialogue that supports the patient's individualized plan of care/goals, treatment preferences, and shares the quality data associated with the providers?  Yes       HEVER Reyes

## 2025-01-27 NOTE — CONSULTS
of cryptogenic stroke, patient ultimately would best benefit from an implantable loop recorder to assess for any subclinical atrial fibrillation to facilitate stroke management going forward  - Implications, risk, benefits of loop recorder was explained the patient in great details, she agrees to proceed  - Proceed with loop recorder implantation  - Will have monthly remote transmissions and outpatient follow-up with EP      ___________________________    Ronna Gillette MD, [facility], [facility]  [facility]  [address] [place], [place] [phone]   [facility]  [address]  [place], [place]  [phone]        [facility] CARDIOLOGY                    Cardiology Care Note     [x]Initial Encounter     []Follow-up    Patient Name: Gaby Stroud - :1960 - MRN:[id number]  Primary Cardiologist: None  Consulting Cardiologist: Ronna Gillette MD     Reason for encounter: cryptogenic stroke    HPI:       Gaby Stroud is a 65 y.o. female with PMH significant for depression and ruptured ACOM aneurysm with subarachnoid hemorrhage 2024 with endovascular coil emobilization. She presented to the ED on 2025 with complaints of right leg weakness, inability to walk and incoordination with both hands. She was evaluated for stroke. CTA wth no new acute finding and stable ACOM aneurysm s/p coiling. MRI head with small to moderate sized acute infarct in the left corona radiata/posterior basal ganglia, multiple small chronic infarcts. EP consulted for heart monitor to evaluate for AF for cryptogenic stroke. Echo with no interatrial shunt visualized ,LVEF 58%.     Subjective:      Gaby Stroud reports none.     Assessment and Plan     Cryptogenic stroke:  - MRI showed head with small to moderate sized acute infarct in the left corona radiata/posterior basal ganglia, multiple small chronic infarcts.  - Echo with no interatrial shunt  is not quantitatively  detected.      Electronically signed by Rom Crouch      Lab Results   Component Value Date/Time     01/27/2025 01:43 AM    K 3.7 01/27/2025 01:43 AM     01/27/2025 01:43 AM    CO2 24 01/27/2025 01:43 AM    BUN 11 01/27/2025 01:43 AM    CREATININE 0.60 01/27/2025 01:43 AM    GLUCOSE 110 01/27/2025 01:43 AM    CALCIUM 8.8 01/27/2025 01:43 AM    LABGLOM >90 01/27/2025 01:43 AM      No results found for: \"BNP\"  Lab Results   Component Value Date    WBC 4.8 01/27/2025    HGB 12.6 01/27/2025    HCT 39.2 01/27/2025    MCV 99.0 01/27/2025     01/27/2025     Recent Labs     01/26/25  0204   CHOL 214*       Current meds:    Current Facility-Administered Medications:     0.9 % sodium chloride infusion, , IntraVENous, Continuous, Estelle Wheeler MD, Last Rate: 75 mL/hr at 01/26/25 1045, New Bag at 01/26/25 1045    amLODIPine (NORVASC) tablet 5 mg, 5 mg, Oral, Daily, Estelle Wheeler MD, 5 mg at 01/27/25 0936    venlafaxine (EFFEXOR XR) extended release capsule 150 mg, 150 mg, Oral, Daily, Pk Chen MD, 150 mg at 01/27/25 0936    sodium chloride flush 0.9 % injection 5-40 mL, 5-40 mL, IntraVENous, 2 times per day, Pk Chen MD, 10 mL at 01/26/25 1101    sodium chloride flush 0.9 % injection 5-40 mL, 5-40 mL, IntraVENous, PRN, Pk Chen MD    0.9 % sodium chloride infusion, , IntraVENous, PRN, Pk Chen MD    ondansetron (ZOFRAN-ODT) disintegrating tablet 4 mg, 4 mg, Oral, Q8H PRN **OR** ondansetron (ZOFRAN) injection 4 mg, 4 mg, IntraVENous, Q6H PRN, Stafford, Pk L, MD    polyethylene glycol (GLYCOLAX) packet 17 g, 17 g, Oral, Daily PRN, Pk Chen MD    aspirin chewable tablet 81 mg, 81 mg, Oral, Daily, 81 mg at 01/27/25 0936 **OR** aspirin suppository 300 mg, 300 mg, Rectal, Daily, Pk Chen MD    thiamine tablet 100 mg, 100 mg, Oral, Daily, Pk Chen MD, 100 mg at 01/27/25 0936    folic acid (FOLVITE) tablet 1 mg, 1 mg,

## 2025-01-27 NOTE — PROCEDURES
Loop Implant    Procedure Date: 01/27/25  Lab Physician: Ronna Gillette MD    INDICATIONS:  Cryptogenic Stroke    PROCEDURE NARRATIVE  After obtaining informed consent, the central chest was prepped and draped in sterile fashion creating a sterile site just lateral to the left side of the sternum at about the 5th intercostal space. This area was infiltrated with lidocaine with epinephrine for local anesthesia. A 1 cm puncture was made with the provided puncture tool and a track was created with the Reveal insertion tool. The Reveal Linq was deployed subcutaneously and the insertion tool was removed. The skin closed with a single layer of 4-0 Vicryl suture and dermabond was applied on the incision. Gauze and a sterile bio-occlusive dressing was applied over the incision. The procedure was well tolerated and there were no immediate complications.    CONCLUSIONS  Successful ILR insertion

## 2025-01-27 NOTE — PLAN OF CARE
Problem: Physical Therapy - Adult  Goal: By Discharge: Performs mobility at highest level of function for planned discharge setting.  See evaluation for individualized goals.  Description: FUNCTIONAL STATUS PRIOR TO ADMISSION: Patient was independent and active without use of DME. Returned to working and driving.    HOME SUPPORT PRIOR TO ADMISSION: The patient lived alone with family to provide assistance if/as needed. PMHx significant for SAH 2* ACOM aneurysm rupture.     Physical Therapy Goals  Re-eval completed 1/27 2* change in status, all goals downgraded.  1.  Patient will move from supine to sit and sit to supine, scoot up and down, and roll side to side in bed with minimal assistance within 7 day(s).    2.  Patient will perform sit to stand with minimal assistance within 7 day(s).  3.  Patient will transfer from bed to chair and chair to bed with moderate assistance using the least restrictive device within 7 day(s).  4.  Patient will ambulate with moderate assistance for 10 feet with the least restrictive device within 7 day(s).   5.  Patient will demo good sitting balance with midline control x2min in prep for mobility progression within 7 days.  6.  Patient will improve Bhakta Balance score by 7 points within 7 days.    Initiated 1/25/2025  1.  Patient will move from supine to sit and sit to supine and scoot up and down in bed with independence within 7 day(s).    2.  Patient will perform sit to stand with independence within 7 day(s).  3.  Patient will transfer from bed to chair and chair to bed with independence using the least restrictive device within 7 day(s).  4.  Patient will ambulate with modified independence for 300 feet with the least restrictive device within 7 day(s).   5.  Patient will ascend/descend 12 stairs with handrail(s) with modified independence within 7 day(s).  6.  Patient will improve Bhakta Balance score by 7 points within 7 days.    Outcome: Progressing   PHYSICAL THERAPY

## 2025-01-27 NOTE — PLAN OF CARE
Speech LAnguage Pathology CLINICAL SWALLOW EVALUATION AND SPEECH/LANGUAGE THERAPY    Patient: Gaby Stroud (65 y.o. female)  Date: 1/27/2025  Primary Diagnosis: Right leg numbness [R20.0]  Right leg weakness [R29.898]  Stroke-like symptoms [R29.90]  Procedure(s) (LRB):  Loop recorder insert (N/A) Day of Surgery   Precautions: Fall Risk                  ASSESSMENT:  Patient participated in clinical swallow evaluation consisting of thin liquids and solids with lunch meal. Patient demonstrated mild oral deficits with solids characterized by mild R buccal and lingual residue which she managed effectively with independent use of liquid wash. Patient did not exhibit any overt s/s of aspiration and denied pharyngeal dysphagia symptoms. Recommend she continue current PO diet of regular texture with thin liquids + swallow strategies as listed below.     Patient also participated in speech and language interventions targeting dysarthria and expressive aphasia. Patient with intermittent word finding difficulties for structured and unstructured tasks, most notably with more open-ended or more complex naming tasks. Verbal output fluent with anomic errors, paraphasias, and delayed naming and thought formulation. She was generally able to self-monitor and self-correct. Patient with relative strength in her auditory comprehension and insight and awareness into current situation and deficits. She endorsed R visual inattention which may impact reading comprehension, although this was not formally assessed this date. Patient with mild motor speech impairments in articulation and rate. She was able to independently state appropriate compensatory speech strategies. Patient will highly benefit from ongoing intensive SLP services to address aforementioned clinical domains, preferably in inpatient rehab setting.     Patient will benefit from skilled intervention to address the above impairments.     PLAN :  Recommendations and Planned

## 2025-01-27 NOTE — PLAN OF CARE
Problem: Discharge Planning  Goal: Discharge to home or other facility with appropriate resources  Outcome: Progressing     Problem: Pain  Goal: Verbalizes/displays adequate comfort level or baseline comfort level  Outcome: Progressing     Problem: Safety - Adult  Goal: Free from fall injury  Outcome: Progressing     Problem: Chronic Conditions and Co-morbidities  Goal: Patient's chronic conditions and co-morbidity symptoms are monitored and maintained or improved  Outcome: Progressing     Problem: SLP Adult - Impaired Communication  Goal: By Discharge: Demonstrates communication skills at highest level of function for planned discharge setting.  See evaluation for individualized goals.  Description: Speech Path Goals:  1. Patient will state compensatory motor speech and word finding strategies with min cues. Initiated 1/26.   2. Patient will produce sentence length utterances with 90% intelligibility with min cues. Initiated 1/26. Met 1/26.   3. Patient will use word finding strategies in conversation as needed with 80% effectiveness. Initiated 1/26.  4. Patient will tolerate least restrictive PO diet with no clinical s/s of aspiration and efficient oral swallowing. Initiated 1/26.   5. Patient will complete complex thought organization tasks with 80% acc with min cues. Initiated 1/27.  1/27/2025 1356 by Ju Feliciano, SLP  Outcome: Progressing

## 2025-01-27 NOTE — PROGRESS NOTES
[facility] Adult  Hospitalist Group                                                                                          Hospitalist Progress Note  Estelle Wheeler MD  Answering service: [phone] OR [phone] from in house phone        Date of Service:  2025  NAME:  Gaby Stroud  :  1960  MRN:  [id number]       Admission Summary:   HPI: \"Gaby Stroud is a 65 y.o. female with past medical history of depression presented to the ED with chief complaint of right leg weakness, inability ambulate, and incoordination of both hands.  Patient reportedly was last known well at about 0800 hrs. on 2025.  At that time, patient had onset of hand coordination.  Patient states \"could not use computer at work\".  Symptoms notedly remain constant, without pacifically bating factors.  Tonight at 2300 hrs., she reportedly went to sleep.  When she awoke this morning on 2025 at 0200 hrs., she reported weakness in her right leg and was unable to ambulate.  He decided come to emergency department.  There is no reports of head and neck trauma, loss of consciousness, loss of slurred speech, or facial droop.  On arrival emergency department, initial reported vital signs were blood pressure 172/90, heart rate 87, respiratory rate 18, O2 saturation 94% on room air.  12-lead EKG showed normal sinus rhythm at 82 bpm.  CT head without IV contrast showed no acute intracranial abnormalities.  CTA head and neck with IV contrast showed previous embolization of ACOM aneurysm but no large vessel occlusion, no perfusion defect.  Patient was seen by ED attending physician, neuro critical care NP, and teleneurologist in consultation.  Patient is now seen for admission to the hospitalist service.        Of note, patient has prior hospitalization from 2024 to 2024 with subarachnoid hemorrhage secondary to ruptured ACOM aneurysm.  On 7/3/2024, underwent endovascular coil embolization of ACOM complex

## 2025-01-28 ENCOUNTER — TELEPHONE (OUTPATIENT)
Facility: HOSPITAL | Age: 65
End: 2025-01-28

## 2025-01-28 PROBLEM — I63.9 CEREBROVASCULAR ACCIDENT (CVA) (HCC): Status: ACTIVE | Noted: 2025-01-27

## 2025-01-28 LAB
ANION GAP SERPL CALC-SCNC: 6 MMOL/L (ref 2–12)
BASOPHILS # BLD: 0.02 K/UL (ref 0–0.1)
BASOPHILS NFR BLD: 0.3 % (ref 0–1)
BUN SERPL-MCNC: 5 MG/DL (ref 6–20)
BUN/CREAT SERPL: 12 (ref 12–20)
CALCIUM SERPL-MCNC: 8.7 MG/DL (ref 8.5–10.1)
CHLORIDE SERPL-SCNC: 105 MMOL/L (ref 97–108)
CO2 SERPL-SCNC: 27 MMOL/L (ref 21–32)
CREAT SERPL-MCNC: 0.43 MG/DL (ref 0.55–1.02)
DIFFERENTIAL METHOD BLD: NORMAL
EOSINOPHIL # BLD: 0.02 K/UL (ref 0–0.4)
EOSINOPHIL NFR BLD: 0.3 % (ref 0–7)
ERYTHROCYTE [DISTWIDTH] IN BLOOD BY AUTOMATED COUNT: 13.2 % (ref 11.5–14.5)
GLUCOSE SERPL-MCNC: 106 MG/DL (ref 65–100)
HCT VFR BLD AUTO: 39.2 % (ref 35–47)
HGB BLD-MCNC: 13.3 G/DL (ref 11.5–16)
IMM GRANULOCYTES # BLD AUTO: 0.02 K/UL (ref 0–0.04)
IMM GRANULOCYTES NFR BLD AUTO: 0.3 % (ref 0–0.5)
LYMPHOCYTES # BLD: 1.13 K/UL (ref 0.8–3.5)
LYMPHOCYTES NFR BLD: 14.4 % (ref 12–49)
MCH RBC QN AUTO: 32.8 PG (ref 26–34)
MCHC RBC AUTO-ENTMCNC: 33.9 G/DL (ref 30–36.5)
MCV RBC AUTO: 96.6 FL (ref 80–99)
MONOCYTES # BLD: 0.91 K/UL (ref 0–1)
MONOCYTES NFR BLD: 11.6 % (ref 5–13)
NEUTS SEG # BLD: 5.73 K/UL (ref 1.8–8)
NEUTS SEG NFR BLD: 73.1 % (ref 32–75)
NRBC # BLD: 0 K/UL (ref 0–0.01)
NRBC BLD-RTO: 0 PER 100 WBC
PLATELET # BLD AUTO: 312 K/UL (ref 150–400)
PMV BLD AUTO: 9.7 FL (ref 8.9–12.9)
POTASSIUM SERPL-SCNC: 4 MMOL/L (ref 3.5–5.1)
RBC # BLD AUTO: 4.06 M/UL (ref 3.8–5.2)
SODIUM SERPL-SCNC: 138 MMOL/L (ref 136–145)
WBC # BLD AUTO: 7.8 K/UL (ref 3.6–11)

## 2025-01-28 PROCEDURE — 97535 SELF CARE MNGMENT TRAINING: CPT

## 2025-01-28 PROCEDURE — 99232 SBSQ HOSP IP/OBS MODERATE 35: CPT | Performed by: NURSE PRACTITIONER

## 2025-01-28 PROCEDURE — 97530 THERAPEUTIC ACTIVITIES: CPT

## 2025-01-28 PROCEDURE — 97116 GAIT TRAINING THERAPY: CPT

## 2025-01-28 PROCEDURE — 2060000000 HC ICU INTERMEDIATE R&B

## 2025-01-28 PROCEDURE — 92526 ORAL FUNCTION THERAPY: CPT

## 2025-01-28 PROCEDURE — 85025 COMPLETE CBC W/AUTO DIFF WBC: CPT

## 2025-01-28 PROCEDURE — 2500000003 HC RX 250 WO HCPCS: Performed by: FAMILY MEDICINE

## 2025-01-28 PROCEDURE — 6370000000 HC RX 637 (ALT 250 FOR IP): Performed by: STUDENT IN AN ORGANIZED HEALTH CARE EDUCATION/TRAINING PROGRAM

## 2025-01-28 PROCEDURE — 6370000000 HC RX 637 (ALT 250 FOR IP): Performed by: FAMILY MEDICINE

## 2025-01-28 PROCEDURE — 80048 BASIC METABOLIC PNL TOTAL CA: CPT

## 2025-01-28 PROCEDURE — 6360000002 HC RX W HCPCS: Performed by: NURSE PRACTITIONER

## 2025-01-28 PROCEDURE — 92507 TX SP LANG VOICE COMM INDIV: CPT

## 2025-01-28 PROCEDURE — 2580000003 HC RX 258: Performed by: NURSE PRACTITIONER

## 2025-01-28 PROCEDURE — 99254 IP/OBS CNSLTJ NEW/EST MOD 60: CPT | Performed by: PHYSICAL MEDICINE & REHABILITATION

## 2025-01-28 PROCEDURE — 97112 NEUROMUSCULAR REEDUCATION: CPT

## 2025-01-28 RX ORDER — LEVETIRACETAM 500 MG/5ML
500 INJECTION, SOLUTION, CONCENTRATE INTRAVENOUS EVERY 12 HOURS
Status: DISCONTINUED | OUTPATIENT
Start: 2025-01-29 | End: 2025-01-29

## 2025-01-28 RX ORDER — AMLODIPINE BESYLATE 5 MG/1
10 TABLET ORAL DAILY
Status: DISCONTINUED | OUTPATIENT
Start: 2025-01-29 | End: 2025-01-29 | Stop reason: HOSPADM

## 2025-01-28 RX ORDER — AMLODIPINE BESYLATE 5 MG/1
5 TABLET ORAL ONCE
Status: COMPLETED | OUTPATIENT
Start: 2025-01-28 | End: 2025-01-28

## 2025-01-28 RX ORDER — AMLODIPINE BESYLATE 5 MG/1
10 TABLET ORAL DAILY
Status: DISCONTINUED | OUTPATIENT
Start: 2025-01-28 | End: 2025-01-28

## 2025-01-28 RX ORDER — LEVETIRACETAM 500 MG/5ML
2 INJECTION, SOLUTION, CONCENTRATE INTRAVENOUS ONCE
Status: COMPLETED | OUTPATIENT
Start: 2025-01-28 | End: 2025-01-28

## 2025-01-28 RX ADMIN — ASPIRIN 81 MG CHEWABLE TABLET 81 MG: 81 TABLET CHEWABLE at 08:37

## 2025-01-28 RX ADMIN — SODIUM CHLORIDE, PRESERVATIVE FREE 10 ML: 5 INJECTION INTRAVENOUS at 08:38

## 2025-01-28 RX ADMIN — LEVETIRACETAM 2000 MG: 100 INJECTION INTRAVENOUS at 19:12

## 2025-01-28 RX ADMIN — AMLODIPINE BESYLATE 5 MG: 5 TABLET ORAL at 10:27

## 2025-01-28 RX ADMIN — Medication 100 MG: at 08:37

## 2025-01-28 RX ADMIN — THERA TABS 1 TABLET: TAB at 08:37

## 2025-01-28 RX ADMIN — Medication 1 MG: at 08:37

## 2025-01-28 RX ADMIN — VENLAFAXINE HYDROCHLORIDE 150 MG: 150 CAPSULE, EXTENDED RELEASE ORAL at 08:37

## 2025-01-28 RX ADMIN — AMLODIPINE BESYLATE 5 MG: 5 TABLET ORAL at 08:37

## 2025-01-28 NOTE — CARE COORDINATION
Transition of Care Plan:    [facility] IPR 1/28  - call report and room # pending    Transport: TBD    RUR:  14%  Prior Level of Functioning: independent - works and drives  Disposition:  IPR  ANGELA:  1/30  If SNF or IPR: Date FOC offered: 1/27  Date FOC received: 1/27  Accepting facility: [facility]   Date authorization started with reference number:  1/28  Date authorization received and expires:  received 1/28  Follow up appointments:  PCP  DME needed:  defer to IPR  Transportation at discharge: BLS likely  IM/IMM Medicare/ letter given: n/a  Caregiver Contact:   Adam Storud (Child)  [phone] (Mobile)   Discharge Caregiver contacted prior to discharge? yes  Care Conference needed? no  Barriers to discharge: medical     9am: Auth pending through [facility] 1/27, PM&R is following. Pt informed at bedside and is in agreement.     3pm: Pt auth approved through [facility] 1/28, facility has bed available 1/29. CM informed MD.     Milena Nguyen, BSW

## 2025-01-28 NOTE — CONSULTS
Consult Note            Date:1/28/2025        Patient Name:Gaby Stroud     YOB: 1960     Age:65 y.o.    Consults Physical Medicine and Rehabilitatio    Chief Complaint     Chief Complaint   Patient presents with    Extremity Weakness     Lkw 0800 yesterday          History Obtained From   patient, electronic medical record    History of Present Illness   Ms. Stroud is a 65-year-old female with a history of depression who presented to the ED with complaint of right leg weakness, inability to ambulate and incoordination in both hands.  Patient was last known well at 8 AM on the morning of 1/24/2025.  At that time she had onset of hand coordination difficulties.  That evening she went to sleep when she awoke on the morning of 1/25/2025 at 2 AM she reported weakness in her right leg and was unable to ambulate.  In the ED blood pressure was 172/90 heart rate 87, respiratory rate 18, O2 saturations 94% on room air.  CT of the head without IV contrast showed no acute intracranial abnormalities.  CTA of the head and neck with IV contrast showed previous embolization of an ACOM aneurysm but no large vessel occlusion, no perfusion deficit.  Patient was recommended for admission.  Of note patient was previously hospitalized from 7/2/2024 to 7/16/2024 with subarachnoid hemorrhage secondary to ACOM aneurysm rupture.  She underwent endovascular and coil embolization of a ACOM complex aneurysm.  On 12/10/2024 she had an IR cerebral angiogram showed no residual or recurrent ACOM aneurysm.  MRI was positive for small to moderate infarct in the left corona radiata.  CT head perfusion and CTA showed no acute pathology.  Hemoglobin A1c 5.2%.  TTE negative for PFO.  She was counseled on alcohol cessation as she does drink daily.  She was recommended for an ILR.  Cardiology consulted and this was placed on 1/27/2025.    Physical medicine rehabilitation has been consulted for disposition recommendations and appropriateness

## 2025-01-28 NOTE — PLAN OF CARE
Problem: Occupational Therapy - Adult  Goal: By Discharge: Performs self-care activities at highest level of function for planned discharge setting.  See evaluation for individualized goals.  Description: FUNCTIONAL STATUS PRIOR TO ADMISSION:  Patient was ambulatory using no DME. Working full time and independent in her self-care.    HOME SUPPORT: Patient lived alone with family in the area for help as needed. PMHx significant for SAH 2* ACOM aneurysm rupture    Occupational Therapy Goals:  Re-Evaluation 1/27/24 s/p acute CVA with significant functional decline, goals modified below  1.  Patient will perform self-feeding with Minimal Assist using RUE as GM/FM assist within 7 day(s).  2.  Patient will perform seated bathing with Moderate Assist within 7 day(s).  3.  Patient will perform upper body dressing with Moderate Assist within 7 day(s).  4.  Patient will perform lower body dressing with Moderate Assist within 7 day(s).  5.  Patient will perform toilet transfers to/from [facility] with Moderate Assist x2 within 7 day(s).  6.  Patient will perform all aspects of toileting with Moderate Assist within 7 day(s).  7.  Patient will participate in upper extremity therapeutic exercise/activities with Minimal Assist within 7 day(s).    8.  Patient will improve their Fugl Garnett score by 5 points in prep for ADLs within 7 days.    Initiated 1/25/2025  1.  Patient will perform bathing with Modified Todd within 7 day(s).  2.  Patient will perform upper body dressing with Modified Todd within 7 day(s).  3.  Patient will perform lower body dressing with Modified Todd within 7 day(s).  4.  Patient will perform toilet transfers with Modified Todd  within 7 day(s).  5.  Patient will perform all aspects of toileting with Modified Todd within 7 day(s).  6.  Patient will participate in upper extremity therapeutic exercise/activities with Modified Todd for 15 minutes within 7 day(s).    7.

## 2025-01-28 NOTE — PROGRESS NOTES
[facility]  Hospitalist Group                                                                                          Hospitalist Progress Note  Estelle Wheeler MD  Answering service: [phone] OR [phone] from in house phone        Date of Service:  2025  NAME:  Gaby Stroud  :  1960  MRN:  [id number]       Admission Summary:   HPI: \"Gaby Stroud is a 65 y.o. female with past medical history of depression presented to the ED with chief complaint of right leg weakness, inability ambulate, and incoordination of both hands.  Patient reportedly was last known well at about 0800 hrs. on 2025.  At that time, patient had onset of hand coordination.  Patient states \"could not use computer at work\".  Symptoms notedly remain constant, without pacifically bating factors.  Tonight at 2300 hrs., she reportedly went to sleep.  When she awoke this morning on 2025 at 0200 hrs., she reported weakness in her right leg and was unable to ambulate.  He decided come to emergency department.  There is no reports of head and neck trauma, loss of consciousness, loss of slurred speech, or facial droop.  On arrival emergency department, initial reported vital signs were blood pressure 172/90, heart rate 87, respiratory rate 18, O2 saturation 94% on room air.  12-lead EKG showed normal sinus rhythm at 82 bpm.  CT head without IV contrast showed no acute intracranial abnormalities.  CTA head and neck with IV contrast showed previous embolization of ACOM aneurysm but no large vessel occlusion, no perfusion defect.  Patient was seen by ED attending physician, neuro critical care NP, and teleneurologist in consultation.  Patient is now seen for admission to the hospitalist service.        Of note, patient has prior hospitalization from 2024 to 2024 with subarachnoid hemorrhage secondary to ruptured ACOM aneurysm.  On 7/3/2024, underwent endovascular coil embolization of ACOM complex  tablet 4 mg  4 mg Oral Q8H PRN    Or    ondansetron (ZOFRAN) injection 4 mg  4 mg IntraVENous Q6H PRN    polyethylene glycol (GLYCOLAX) packet 17 g  17 g Oral Daily PRN    aspirin chewable tablet 81 mg  81 mg Oral Daily    Or    aspirin suppository 300 mg  300 mg Rectal Daily    thiamine tablet 100 mg  100 mg Oral Daily    folic acid (FOLVITE) tablet 1 mg  1 mg Oral Daily    multivitamin 1 tablet  1 tablet Oral Daily    sodium chloride flush 0.9 % injection 5-40 mL  5-40 mL IntraVENous 2 times per day    sodium chloride flush 0.9 % injection 5-40 mL  5-40 mL IntraVENous PRN    LORazepam (ATIVAN) tablet 1 mg  1 mg Oral Q1H PRN    Or    LORazepam (ATIVAN) injection 1 mg  1 mg IntraVENous Q1H PRN    Or    LORazepam (ATIVAN) tablet 2 mg  2 mg Oral Q1H PRN    Or    LORazepam (ATIVAN) injection 2 mg  2 mg IntraVENous Q1H PRN    Or    LORazepam (ATIVAN) tablet 3 mg  3 mg Oral Q1H PRN    Or    LORazepam (ATIVAN) injection 3 mg  3 mg IntraVENous Q1H PRN    Or    LORazepam (ATIVAN) tablet 4 mg  4 mg Oral Q1H PRN    Or    LORazepam (ATIVAN) injection 4 mg  4 mg IntraVENous Q1H PRN    hydrALAZINE (APRESOLINE) injection 10 mg  10 mg IntraVENous Q6H PRN     ______________________________________________________________________  EXPECTED LENGTH OF STAY: Unable to retrieve estimated LOS  ACTUAL LENGTH OF STAY:          3                 Estelle Wheeler MD

## 2025-01-28 NOTE — PLAN OF CARE
Problem: Discharge Planning  Goal: Discharge to home or other facility with appropriate resources  1/28/2025 0903 by Tracey Stephenson RN  Outcome: Progressing  Flowsheets (Taken 1/28/2025 0800)  Discharge to home or other facility with appropriate resources:   Identify barriers to discharge with patient and caregiver   Arrange for needed discharge resources and transportation as appropriate   Identify discharge learning needs (meds, wound care, etc)  1/28/2025 0004 by Nubia Nolen LPN  Outcome: Progressing  Flowsheets (Taken 1/27/2025 2000)  Discharge to home or other facility with appropriate resources:   Identify barriers to discharge with patient and caregiver   Arrange for needed discharge resources and transportation as appropriate   Identify discharge learning needs (meds, wound care, etc)   Refer to discharge planning if patient needs post-hospital services based on physician order or complex needs related to functional status, cognitive ability or social support system     Problem: Pain  Goal: Verbalizes/displays adequate comfort level or baseline comfort level  1/28/2025 0903 by Tracey Stephenson RN  Outcome: Progressing  1/28/2025 0004 by Nubia Nolen LPN  Outcome: Progressing     Problem: Safety - Adult  Goal: Free from fall injury  1/28/2025 0903 by Tracey Stephenson RN  Outcome: Progressing  Flowsheets (Taken 1/28/2025 0800)  Free From Fall Injury:   Instruct family/caregiver on patient safety   Based on caregiver fall risk screen, instruct family/caregiver to ask for assistance with transferring infant if caregiver noted to have fall risk factors  1/28/2025 0004 by Nubia Nolen LPN  Outcome: Progressing  Flowsheets (Taken 1/27/2025 2000)  Free From Fall Injury:   Instruct family/caregiver on patient safety   Based on caregiver fall risk screen, instruct family/caregiver to ask for assistance with transferring infant if caregiver noted to have fall risk factors     Problem: Chronic Conditions and

## 2025-01-28 NOTE — PLAN OF CARE
Problem: SLP Adult - Impaired Communication  Goal: By Discharge: Demonstrates communication skills at highest level of function for planned discharge setting.  See evaluation for individualized goals.  Description: Speech Path Goals:  1. Patient will state compensatory motor speech and word finding strategies with min cues. Initiated 1/26.   2. Patient will produce sentence length utterances with 90% intelligibility with min cues. Initiated 1/26. Met 1/26.   3. Patient will use word finding strategies in conversation as needed with 80% effectiveness. Initiated 1/26.  4. Patient will tolerate least restrictive PO diet with no clinical s/s of aspiration and efficient oral swallowing. Initiated 1/26.   5. Patient will complete complex thought organization tasks with 80% acc with min cues. Initiated 1/27.  Outcome: Progressing   Speech LAnguage Pathology TREATMENT    Patient: Gaby Stroud (65 y.o. female)  Date: 1/28/2025  Primary Diagnosis: Right leg numbness [R20.0]  Right leg weakness [R29.898]  Stroke-like symptoms [R29.90]  Procedure(s) (LRB):  Loop recorder insert (N/A) 1 Day Post-Op   Precautions:  Fall Risk                  ASSESSMENT :  Patient continues to progress in her ability to carry on conversation and effectively express wants/needs. Speech continues to be characterized by mildly dysarthric speech with blended word boundaries; though fully intelligible. She named a wide variety of items around the room, placed noun and verb stimuli words into sentences and provided 3 semantic features for items with minimal cues. She will benefit from intensive ST at the next level of care.    Patient tolerating a regular diet/ thin liquids with independent use of liquid wash and lingual sweep in order to ensure full oral clearance. No s/s aspiration with moisés crackers or successive sips of thins. No further dysphagia needs.    Patient will benefit from skilled intervention to address the above impairments.     PLAN  :  Recommendations and Planned Interventions:  Diet: regular diet/ thin liquids  Alternate liquid/solids  Encourage reduced, rate, over-articulation     Recommend next SLP session: aphasia, dysarthria    Acute SLP Services: Yes, SLP will continue to follow per plan of care.  Discharge Recommendations: Yes, recommend SLP treatment at next level of care     SUBJECTIVE:   Patient's son at bedside    OBJECTIVE:     Past Medical History:   Diagnosis Date    Psychiatric disorder     depresssion      Past Surgical History:   Procedure Laterality Date    BRAIN ANEURYSM SURGERY      BREAST BIOPSY Right     neg    BREAST BIOPSY Left     X 3; all neg    EP DEVICE PROCEDURE N/A 2025    Loop recorder insert performed by Ronna Gillette MD at [facility] CARDIAC CATH LAB    GYN           Prior Level of Function/Home Situation:   Social/Functional History  Lives With: Alone  Type of Home: House  Home Layout: Two level  Home Access: Stairs to enter with rails  Entrance Stairs - Number of Steps: 2  Entrance Stairs - Rails: None  Bathroom Shower/Tub: None  Bathroom Equipment: None  Home Equipment: None  Prior Level of Assist for ADLs: Independent  Prior Level of Assist for Homemaking: Independent  Homemaking Responsibilities: Yes  Prior Level of Assist for Ambulation: Independent community ambulator, with or without device  Prior Level of Assist for Transfers: Independent  Active : Yes  Mode of Transportation: Car  Occupation: Full time employment  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 Trials:  PO Trials  Neuromuscular Estim Used: No  Assessment Method(s): Observation  Patient Position: up in chair  Vocal Quality: No Impairment  Consistency Presented: Thin;Regular  How Presented: Self-fed/presented  Propulsion: No impairment  Oral Residue: None  Aspiration Signs/Symptoms: None  Pharyngeal

## 2025-01-28 NOTE — PLAN OF CARE
Problem: Discharge Planning  Goal: Discharge to home or other facility with appropriate resources  1/28/2025 0004 by Nubia Nolen LPN  Outcome: Progressing  Flowsheets (Taken 1/27/2025 2000)  Discharge to home or other facility with appropriate resources:   Identify barriers to discharge with patient and caregiver   Arrange for needed discharge resources and transportation as appropriate   Identify discharge learning needs (meds, wound care, etc)   Refer to discharge planning if patient needs post-hospital services based on physician order or complex needs related to functional status, cognitive ability or social support system  1/27/2025 1628 by Mandy Whitmore, RN  Outcome: Progressing  Flowsheets (Taken 1/27/2025 0800)  Discharge to home or other facility with appropriate resources: Identify barriers to discharge with patient and caregiver     Problem: Pain  Goal: Verbalizes/displays adequate comfort level or baseline comfort level  1/28/2025 0004 by Nubia Nolen LPN  Outcome: Progressing  1/27/2025 1628 by Mandy Whitmore, RN  Outcome: Progressing     Problem: Safety - Adult  Goal: Free from fall injury  1/28/2025 0004 by Nubia Nolen LPN  Outcome: Progressing  Flowsheets (Taken 1/27/2025 2000)  Free From Fall Injury:   Instruct family/caregiver on patient safety   Based on caregiver fall risk screen, instruct family/caregiver to ask for assistance with transferring infant if caregiver noted to have fall risk factors  1/27/2025 1628 by Mandy Whitmore, RN  Outcome: Progressing  Flowsheets (Taken 1/27/2025 0800)  Free From Fall Injury: Instruct family/caregiver on patient safety     Problem: Chronic Conditions and Co-morbidities  Goal: Patient's chronic conditions and co-morbidity symptoms are monitored and maintained or improved  1/28/2025 0004 by Nubia Nolen LPN  Outcome: Progressing  Flowsheets (Taken 1/27/2025 2000)  Care Plan - Patient's Chronic Conditions and Co-Morbidity Symptoms are Monitored and  Maintained or Improved:   Monitor and assess patient's chronic conditions and comorbid symptoms for stability, deterioration, or improvement   Collaborate with multidisciplinary team to address chronic and comorbid conditions and prevent exacerbation or deterioration   Update acute care plan with appropriate goals if chronic or comorbid symptoms are exacerbated and prevent overall improvement and discharge  1/27/2025 1628 by Mandy Whitmore, RN  Outcome: Progressing  Flowsheets (Taken 1/27/2025 0800)  Care Plan - Patient's Chronic Conditions and Co-Morbidity Symptoms are Monitored and Maintained or Improved: Monitor and assess patient's chronic conditions and comorbid symptoms for stability, deterioration, or improvement

## 2025-01-29 VITALS
SYSTOLIC BLOOD PRESSURE: 127 MMHG | HEIGHT: 62 IN | TEMPERATURE: 98.1 F | DIASTOLIC BLOOD PRESSURE: 76 MMHG | WEIGHT: 139.9 LBS | RESPIRATION RATE: 18 BRPM | OXYGEN SATURATION: 96 % | HEART RATE: 97 BPM | BODY MASS INDEX: 25.75 KG/M2

## 2025-01-29 PROBLEM — I69.351 SPASTIC HEMIPLEGIA OF RIGHT DOMINANT SIDE AS LATE EFFECT OF CEREBRAL INFARCTION (HCC): Status: ACTIVE | Noted: 2025-01-29

## 2025-01-29 PROCEDURE — 2500000003 HC RX 250 WO HCPCS: Performed by: FAMILY MEDICINE

## 2025-01-29 PROCEDURE — 6370000000 HC RX 637 (ALT 250 FOR IP): Performed by: FAMILY MEDICINE

## 2025-01-29 PROCEDURE — 6370000000 HC RX 637 (ALT 250 FOR IP): Performed by: STUDENT IN AN ORGANIZED HEALTH CARE EDUCATION/TRAINING PROGRAM

## 2025-01-29 RX ORDER — ATORVASTATIN CALCIUM 20 MG/1
40 TABLET, FILM COATED ORAL NIGHTLY
Status: DISCONTINUED | OUTPATIENT
Start: 2025-01-29 | End: 2025-01-29 | Stop reason: HOSPADM

## 2025-01-29 RX ORDER — AMLODIPINE BESYLATE 10 MG/1
10 TABLET ORAL DAILY
Qty: 30 TABLET | Refills: 0 | Status: SHIPPED
Start: 2025-01-30 | End: 2025-03-01

## 2025-01-29 RX ORDER — ASPIRIN 81 MG/1
81 TABLET, CHEWABLE ORAL DAILY
Qty: 30 TABLET | Refills: 0 | Status: SHIPPED
Start: 2025-01-30 | End: 2025-03-01

## 2025-01-29 RX ORDER — ATORVASTATIN CALCIUM 40 MG/1
40 TABLET, FILM COATED ORAL NIGHTLY
Qty: 30 TABLET | Refills: 0 | Status: SHIPPED
Start: 2025-01-29 | End: 2025-02-28

## 2025-01-29 RX ADMIN — SODIUM CHLORIDE, PRESERVATIVE FREE 10 ML: 5 INJECTION INTRAVENOUS at 08:39

## 2025-01-29 RX ADMIN — AMLODIPINE BESYLATE 10 MG: 5 TABLET ORAL at 08:38

## 2025-01-29 RX ADMIN — Medication 1 MG: at 08:38

## 2025-01-29 RX ADMIN — Medication 100 MG: at 08:38

## 2025-01-29 RX ADMIN — VENLAFAXINE HYDROCHLORIDE 150 MG: 150 CAPSULE, EXTENDED RELEASE ORAL at 08:38

## 2025-01-29 RX ADMIN — ASPIRIN 81 MG CHEWABLE TABLET 81 MG: 81 TABLET CHEWABLE at 08:38

## 2025-01-29 RX ADMIN — THERA TABS 1 TABLET: TAB at 08:38

## 2025-01-29 ASSESSMENT — ENCOUNTER SYMPTOMS
RESPIRATORY NEGATIVE: 1
GASTROINTESTINAL NEGATIVE: 1

## 2025-01-29 NOTE — PROGRESS NOTES
Pt DC instructions reviewed with pt. PIV removed. All questions answered. Pt being transported via stretcher with [facility] at 1400. Pt left with transport ~ 1410. Telephone report called to RICARDO at 1425. This was the second attempt at giving report.

## 2025-01-29 NOTE — PLAN OF CARE
Problem: Discharge Planning  Goal: Discharge to home or other facility with appropriate resources  1/29/2025 1039 by Tracey Stephenson RN  Outcome: Progressing  Flowsheets (Taken 1/29/2025 0800)  Discharge to home or other facility with appropriate resources:   Identify barriers to discharge with patient and caregiver   Arrange for needed discharge resources and transportation as appropriate   Identify discharge learning needs (meds, wound care, etc)  1/28/2025 2211 by Nubia Nolen LPN  Outcome: Progressing  Flowsheets (Taken 1/28/2025 2000)  Discharge to home or other facility with appropriate resources:   Identify barriers to discharge with patient and caregiver   Arrange for needed discharge resources and transportation as appropriate   Identify discharge learning needs (meds, wound care, etc)   Refer to discharge planning if patient needs post-hospital services based on physician order or complex needs related to functional status, cognitive ability or social support system     Problem: Pain  Goal: Verbalizes/displays adequate comfort level or baseline comfort level  1/29/2025 1039 by Tracey Stephenson RN  Outcome: Progressing  1/28/2025 2211 by Nubia Nolen LPN  Outcome: Progressing     Problem: Safety - Adult  Goal: Free from fall injury  1/29/2025 1039 by Tracey Stephenson RN  Outcome: Progressing  Flowsheets (Taken 1/29/2025 0800)  Free From Fall Injury:   Instruct family/caregiver on patient safety   Based on caregiver fall risk screen, instruct family/caregiver to ask for assistance with transferring infant if caregiver noted to have fall risk factors  1/28/2025 2211 by Nubia Nolen LPN  Outcome: Progressing  Flowsheets (Taken 1/28/2025 2000)  Free From Fall Injury:   Instruct family/caregiver on patient safety   Based on caregiver fall risk screen, instruct family/caregiver to ask for assistance with transferring infant if caregiver noted to have fall risk factors     Problem: Chronic Conditions and

## 2025-01-29 NOTE — PROGRESS NOTES
Spiritual Health History and Assessment/Progress Note  [facility]    Rituals, Rites and Sacraments,  ,  ,      Name: Gaby Stroud MRN: [id number]    Age: 65 y.o.     Sex: female   Language: English   Taoist: Taoism   Stroke-like symptoms     Date: 1/29/2025            Total Time Calculated: 5 min              Spiritual Assessment continued in [facility] 6S NEURO-SCI TELE        Referral/Consult From: Clergy/   Encounter Overview/Reason: Rituals, Rites and Sacraments  Service Provided For: Patient    Tamara, Belief, Meaning:   Patient is connected with a tamara tradition or spiritual practice  Family/Friends No family/friends present      Importance and Influence:  Patient has spiritual/personal beliefs that influence decisions regarding their health  Family/Friends No family/friends present    Community:  Patient is connected with a spiritual community  Family/Friends No family/friends present    Assessment and Plan of Care:     Patient Interventions include: Provided sacramental/Restoration ritual  Family/Friends Interventions include: No family/friends present    Patient Plan of Care: Spiritual Care available upon further referral  Family/Friends Plan of Care: Spiritual Care available upon further referral    Electronically signed by Chaplain JOCELYN on 1/29/2025 at 1:06 PM     [facility]  Mrs. Stroud was initially asleep.  Returned and she declined communion for today.  Prayer offered for her well-being.

## 2025-01-29 NOTE — PLAN OF CARE
Problem: Discharge Planning  Goal: Discharge to home or other facility with appropriate resources  1/28/2025 2211 by Nubia Nolen LPN  Outcome: Progressing  Flowsheets (Taken 1/28/2025 2000)  Discharge to home or other facility with appropriate resources:   Identify barriers to discharge with patient and caregiver   Arrange for needed discharge resources and transportation as appropriate   Identify discharge learning needs (meds, wound care, etc)   Refer to discharge planning if patient needs post-hospital services based on physician order or complex needs related to functional status, cognitive ability or social support system  1/28/2025 0903 by Tracey Stephenson, RN  Outcome: Progressing  Flowsheets (Taken 1/28/2025 0800)  Discharge to home or other facility with appropriate resources:   Identify barriers to discharge with patient and caregiver   Arrange for needed discharge resources and transportation as appropriate   Identify discharge learning needs (meds, wound care, etc)     Problem: Pain  Goal: Verbalizes/displays adequate comfort level or baseline comfort level  1/28/2025 2211 by Nubia Nolen LPN  Outcome: Progressing  1/28/2025 0903 by Tracey Stephenson RN  Outcome: Progressing     Problem: Safety - Adult  Goal: Free from fall injury  1/28/2025 2211 by Nubia Nolen LPN  Outcome: Progressing  Flowsheets (Taken 1/28/2025 2000)  Free From Fall Injury:   Instruct family/caregiver on patient safety   Based on caregiver fall risk screen, instruct family/caregiver to ask for assistance with transferring infant if caregiver noted to have fall risk factors  1/28/2025 0903 by Tracey Stephenson, RN  Outcome: Progressing  Flowsheets (Taken 1/28/2025 0800)  Free From Fall Injury:   Instruct family/caregiver on patient safety   Based on caregiver fall risk screen, instruct family/caregiver to ask for assistance with transferring infant if caregiver noted to have fall risk factors     Problem: Chronic Conditions and

## 2025-01-29 NOTE — CARE COORDINATION
Transition of Care Plan:    [facility] IPR 1/29  - call report [phone], RM [phone]    Transport: [facility] scheduled 2pm C    RUR:  14%  Prior Level of Functioning: independent - works and drives  Disposition:  IPR  ANGELA:  1/30  If SNF or IPR: Date FOC offered: 1/27  Date FOC received: 1/27  Accepting facility: [facility]   Date authorization started with reference number:  1/28  Date authorization received and expires:  received 1/28  Follow up appointments:  PCP  DME needed:  defer to IPR  Transportation at discharge: BLS likely  IM/IMM Medicare/ letter given: n/a  Caregiver Contact:   Adam Stroud (Child)  [phone] (Mobile)   Discharge Caregiver contacted prior to discharge? yes  Care Conference needed? no  Barriers to discharge: none, awaiting transport to IPR     9am: CM informed son of Pt dc to [facility] 1/29. [facility] scheduled per son request. Nurse informed of transport time.     HEVER Reyes

## 2025-01-29 NOTE — TELEPHONE ENCOUNTER
Pt needs a hospital follow up appointment  Provider: any   Location: [facility] clinic vs virtual  In person or virtual: TBD  When: 4-6 weeks   Diagnosis/reason for follow up:  acute CVA  Additional information:

## 2025-01-29 NOTE — PROGRESS NOTES
Neurology Progress Note     NAME: Gaby Stroud   :  1960   MRN:  [id number]   DATE:  2025    Assessment:     Principal Problem:    Stroke-like symptoms  Active Problems:    Suspected cerebrovascular accident    Right leg weakness    Cerebrovascular accident (CVA) due to embolism of anterior cerebral artery (HCC)    Brain aneurysm    Primary hypertension  Resolved Problems:    * No resolved hospital problems. 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 Stokes who presented to the ED on the early morning of 2024 with complaints of bilateral hand incoordination and BLE weakness. MRI Brain has confirmed an evolving subacute L basal ganglia possible anterior choroidal infarction. TTE EF 58%, LA normal, bubble study negative.  A1c 5.2, .8    L basal ganglia acute infarct  Plan:   - Continue aspirin 81 mg daily  - Continue high-dose statin for goal LDL <70  - A1c at goal  - TTE as above  - Cardiology consulted and ILR placed yesterday to eval for afib  - PT/OT/SLP following- Dispo IPR/RICARDO  - Discussed alcohol cessation- on scheduled thiamine, folic acid and MVI  - Patient should not drive for 6 months from stroke. This was discussed with son and patient at bedside and they verbalized understanding  - Patient will need to f/u in the Neurology clinic in 4-6 weeks    No further recommendations. Please contact if any questions.  Subjective:   Patient indicated understanding of above plan.    Objective:   Chart reviewed since last seen    Current Facility-Administered Medications   Medication Dose Route Frequency    [START ON 2025] amLODIPine (NORVASC) tablet 10 mg  10 mg Oral Daily    [START ON 2025]  [Urine:1850]      Physical Exam:  General: Well developed well nourished patient in no apparent distress.   Cardiac: Regular rate and rhythm   Respiratory: No increased WOB  Extremities: 2+ Radial pulses, no cyanosis or edema    Neurological Exam:  Mental Status: Oriented to time, place and person. Non-fluent, moderate dysarthria. A   Cranial Nerves:   VFF, PERRL, EOMI- L gaze preference but crosses midline to R, no nystagmus, no ptosis. R facial droop. R tongue deviation.       Motor:  LUE/LLE follows commands 5/5, RUE 0-1/5, RLE 2/5, 0/5 df/pf   Reflexes:   Remainder of exam deferred   Sensory:      Gait:     Cerebellar:           Lab Review   Recent Results (from the past 24 hour(s))   CBC with Auto Differential    Collection Time: 01/28/25  4:55 AM   Result Value Ref Range    WBC 7.8 3.6 - 11.0 K/uL    RBC 4.06 3.80 - 5.20 M/uL    Hemoglobin 13.3 11.5 - 16.0 g/dL    Hematocrit 39.2 35.0 - 47.0 %    MCV 96.6 80.0 - 99.0 FL    MCH 32.8 26.0 - 34.0 PG    MCHC 33.9 30.0 - 36.5 g/dL    RDW 13.2 11.5 - 14.5 %    Platelets 312 150 - 400 K/uL    MPV 9.7 8.9 - 12.9 FL    Nucleated RBCs 0.0 0  WBC    nRBC 0.00 0.00 - 0.01 K/uL    Neutrophils % 73.1 32.0 - 75.0 %    Lymphocytes % 14.4 12.0 - 49.0 %    Monocytes % 11.6 5.0 - 13.0 %    Eosinophils % 0.3 0.0 - 7.0 %    Basophils % 0.3 0.0 - 1.0 %    Immature Granulocytes % 0.3 0.0 - 0.5 %    Neutrophils Absolute 5.73 1.80 - 8.00 K/UL    Lymphocytes Absolute 1.13 0.80 - 3.50 K/UL    Monocytes Absolute 0.91 0.00 - 1.00 K/UL    Eosinophils Absolute 0.02 0.00 - 0.40 K/UL    Basophils Absolute 0.02 0.00 - 0.10 K/UL    Immature Granulocytes Absolute 0.02 0.00 - 0.04 K/UL    Differential Type AUTOMATED     Basic Metabolic Panel    Collection Time: 01/28/25  4:55 AM   Result Value Ref Range    Sodium 138 136 - 145 mmol/L    Potassium 4.0 3.5 - 5.1 mmol/L    Chloride 105 97 - 108 mmol/L    CO2 27 21 - 32 mmol/L    Anion Gap 6 2 - 12 mmol/L    Glucose 106 (H) 65 - 100 mg/dL

## 2025-01-29 NOTE — DISCHARGE SUMMARY
Discharge Summary       PATIENT ID: Gaby Stroud  MRN: [id number]   YOB: 1960    DATE OF ADMISSION: 1/25/2025  2:52 AM    DATE OF DISCHARGE: 01/29/25    PRIMARY CARE PROVIDER: Vignesh Michelle MD     ATTENDING PHYSICIAN: Estelle Wheeler MD   DISCHARGING PROVIDER: Estelle Wheeler MD    To contact this individual call [phone] and ask the  to page.  If unavailable ask to be transferred the Adult Hospitalist Department.    CONSULTATIONS: IP CONSULT TO TELE-NEUROLOGY  IP CONSULT TO NEUROLOGY  IP CONSULT TO CASE MANAGEMENT  IP CONSULT TO SOCIAL WORK  IP CONSULT TO TELE-NEUROLOGY  IP CONSULT TO CARDIOLOGY  IP CONSULT TO PHYSICAL MEDICINE REHAB    PROCEDURES/SURGERIES: Procedure(s):  Loop recorder insert     ADMITTING DIAGNOSES & HOSPITAL COURSE:   HPI: \"Gaby Stroud is a 65 y.o. female with past medical history of depression presented to the ED with chief complaint of right leg weakness, inability ambulate, and incoordination of both hands.  Patient reportedly was last known well at about 0800 hrs. on 1/24/2025.  At that time, patient had onset of hand coordination.  Patient states \"could not use computer at work\".  Symptoms notedly remain constant, without pacifically bating factors.  Tonight at 2300 hrs., she reportedly went to sleep.  When she awoke this morning on 1/25/2025 at 0200 hrs., she reported weakness in her right leg and was unable to ambulate.  He decided come to emergency department.  There is no reports of head and neck trauma, loss of consciousness, loss of slurred speech, or facial droop.  On arrival emergency department, initial reported vital signs were blood pressure 172/90, heart rate 87, respiratory rate 18, O2 saturation 94% on room air.  12-lead EKG showed normal sinus rhythm at 82 bpm.  CT head without IV contrast showed no acute intracranial abnormalities.  CTA head and neck with IV contrast showed previous embolization of ACOM aneurysm but no large vessel    aspirin 81 MG chewable tablet  Take 1 tablet by mouth daily  Start taking on: January 30, 2025     atorvastatin 40 MG tablet  Commonly known as: LIPITOR  Take 1 tablet by mouth nightly            CONTINUE taking these medications      venlafaxine 150 MG extended release capsule  Commonly known as: EFFEXOR XR            STOP taking these medications      niMODipine 30 MG capsule  Commonly known as: NIMOTOP            ASK your doctor about these medications      butalbital-acetaminophen-caffeine -40 MG per tablet  Commonly known as: FIORICET, ESGIC  Take 1 tablet by mouth every 6 hours as needed for Headaches (For severe headaches)     meclizine 25 MG tablet  Commonly known as: ANTIVERT               Where to Get Your Medications        Information about where to get these medications is not yet available    Ask your nurse or doctor about these medications  amLODIPine 10 MG tablet  aspirin 81 MG chewable tablet  atorvastatin 40 MG tablet           NOTIFY YOUR PHYSICIAN FOR ANY OF THE FOLLOWING:   Fever over 101 degrees for 24 hours.   Chest pain, shortness of breath, fever, chills, nausea, vomiting, diarrhea, change in mentation, falling, weakness, bleeding. Severe pain or pain not relieved by medications.  Or, any other signs or symptoms that you may have questions about.    DISPOSITION:    Home With:   OT  PT  HH  RN      x Long term SNF/Inpatient Rehab    Independent/assisted living    Hospice    Other:       PATIENT CONDITION AT DISCHARGE:     Functional status    Poor    x Deconditioned     Independent      Cognition    x Lucid     Forgetful     Dementia      Catheters/lines (plus indication)    Cook     PICC     PEG    x None      Code status    x Full code     DNR      PHYSICAL EXAMINATION AT DISCHARGE:    General : alert x 3, awake, no acute distress,   HEENT: PEERL, EOMI, moist mucus membrane, TM clear  Neck: supple, no JVD, no meningeal signs  Chest: Clear to auscultation bilaterally   CVS: S1 S2

## 2025-02-06 NOTE — PROGRESS NOTES
Physician Progress Note      PATIENT:               INES CHAPPELL  CSN #:                  [id number]  :                       1960  ADMIT DATE:       2025 2:52 AM  DISCH DATE:        2025 2:58 PM  RESPONDING  PROVIDER #:        Estelle Wheeelr MD          QUERY TEXT:    Patient admitted with CVA with complaint of difficulty coordination of her   upper extremities, and right leg weakness and numbness. Noted documentation of   Hemiplegia from the current CVA. If possible, please document in progress   notes and discharge summary if you are evaluating and /or treating any of the   following:    The medical record reflects the following:  Risk Factors: Acute CVA and prior hx CVA    Clinical Indicators: reported RLE weakness unable to walk and incoordination   of both hands, per DC Summary \" Spastic hemiplegia of right dominant side as   late effect of cerebral infarction\"  -MRI + for small to moderate infarct in left corona radiata  -per OT PNs  \"toileting with 2 assist to the L, stand pivot with 2 assist   to the L\"    Treatment: - Neurologist consulted appreciate recs cont asa statin  - PT OT SLP    Please call if you have any questions or need assistance. I can also be   reached via [employer] or [employer] # [phone].  Thank you,  Ewelina Maher RN/CDI  Options provided:  -- Hemiplegia confirmed and Monoplegia ruled out  -- Monoplegia confirmed and Hemiplegia ruled out  -- Other - I will add my own diagnosis  -- Disagree - Not applicable / Not valid  -- Disagree - Clinically unable to determine / Unknown  -- Refer to Clinical Documentation Reviewer    PROVIDER RESPONSE TEXT:    After study, Hemiplegia confirmed and Monoplegia ruled out.    Query created by: Ewelina Maher on 2025 5:49 AM      Electronically signed by:  Estelle Wheeler MD 2025 9:39 AM

## 2025-03-14 ENCOUNTER — APPOINTMENT (OUTPATIENT)
Facility: HOSPITAL | Age: 65
DRG: 065 | End: 2025-03-14
Payer: COMMERCIAL

## 2025-03-14 ENCOUNTER — HOSPITAL ENCOUNTER (INPATIENT)
Facility: HOSPITAL | Age: 65
LOS: 2 days | Discharge: NURSING, REHAB OR LONG-TERM CARE | DRG: 065 | End: 2025-03-17
Attending: STUDENT IN AN ORGANIZED HEALTH CARE EDUCATION/TRAINING PROGRAM | Admitting: STUDENT IN AN ORGANIZED HEALTH CARE EDUCATION/TRAINING PROGRAM
Payer: COMMERCIAL

## 2025-03-14 DIAGNOSIS — I63.9 CEREBROVASCULAR ACCIDENT (CVA), UNSPECIFIED MECHANISM (HCC): Primary | ICD-10-CM

## 2025-03-14 PROBLEM — I61.9 CVA (CEREBROVASCULAR ACCIDENT DUE TO INTRACEREBRAL HEMORRHAGE) (HCC): Status: ACTIVE | Noted: 2025-03-14

## 2025-03-14 LAB
ALBUMIN SERPL-MCNC: 3.5 G/DL (ref 3.5–5)
ALBUMIN/GLOB SERPL: 0.9 (ref 1.1–2.2)
ALP SERPL-CCNC: 98 U/L (ref 45–117)
ALT SERPL-CCNC: 36 U/L (ref 12–78)
ANION GAP SERPL CALC-SCNC: 9 MMOL/L (ref 2–12)
APPEARANCE UR: CLEAR
AST SERPL-CCNC: 17 U/L (ref 15–37)
BACTERIA URNS QL MICRO: NEGATIVE /HPF
BASOPHILS # BLD: 0.03 K/UL (ref 0–0.1)
BASOPHILS NFR BLD: 0.6 % (ref 0–1)
BILIRUB SERPL-MCNC: 0.3 MG/DL (ref 0.2–1)
BILIRUB UR QL: NEGATIVE
BUN SERPL-MCNC: 16 MG/DL (ref 6–20)
BUN/CREAT SERPL: 24 (ref 12–20)
CALCIUM SERPL-MCNC: 9.3 MG/DL (ref 8.5–10.1)
CHLORIDE SERPL-SCNC: 103 MMOL/L (ref 97–108)
CO2 SERPL-SCNC: 30 MMOL/L (ref 21–32)
COLOR UR: ABNORMAL
CREAT SERPL-MCNC: 0.67 MG/DL (ref 0.55–1.02)
DIFFERENTIAL METHOD BLD: ABNORMAL
EKG ATRIAL RATE: 82 BPM
EKG DIAGNOSIS: NORMAL
EKG P AXIS: 54 DEGREES
EKG P-R INTERVAL: 158 MS
EKG Q-T INTERVAL: 398 MS
EKG QRS DURATION: 72 MS
EKG QTC CALCULATION (BAZETT): 464 MS
EKG R AXIS: 45 DEGREES
EKG T AXIS: 48 DEGREES
EKG VENTRICULAR RATE: 82 BPM
EOSINOPHIL # BLD: 0.09 K/UL (ref 0–0.4)
EOSINOPHIL NFR BLD: 1.9 % (ref 0–7)
EPITH CASTS URNS QL MICRO: ABNORMAL /LPF
ERYTHROCYTE [DISTWIDTH] IN BLOOD BY AUTOMATED COUNT: 12.7 % (ref 11.5–14.5)
GLOBULIN SER CALC-MCNC: 3.7 G/DL (ref 2–4)
GLUCOSE BLD STRIP.AUTO-MCNC: 98 MG/DL (ref 65–117)
GLUCOSE SERPL-MCNC: 84 MG/DL (ref 65–100)
GLUCOSE UR STRIP.AUTO-MCNC: NEGATIVE MG/DL
HCT VFR BLD AUTO: 39.3 % (ref 35–47)
HGB BLD-MCNC: 12.9 G/DL (ref 11.5–16)
HGB UR QL STRIP: NEGATIVE
IMM GRANULOCYTES # BLD AUTO: 0.01 K/UL (ref 0–0.04)
IMM GRANULOCYTES NFR BLD AUTO: 0.2 % (ref 0–0.5)
INR PPP: 1 (ref 0.9–1.1)
KETONES UR QL STRIP.AUTO: NEGATIVE MG/DL
LEUKOCYTE ESTERASE UR QL STRIP.AUTO: NEGATIVE
LYMPHOCYTES # BLD: 1.49 K/UL (ref 0.8–3.5)
LYMPHOCYTES NFR BLD: 31 % (ref 12–49)
MCH RBC QN AUTO: 31.9 PG (ref 26–34)
MCHC RBC AUTO-ENTMCNC: 32.8 G/DL (ref 30–36.5)
MCV RBC AUTO: 97.3 FL (ref 80–99)
MONOCYTES # BLD: 0.67 K/UL (ref 0–1)
MONOCYTES NFR BLD: 14 % (ref 5–13)
NEUTS SEG # BLD: 2.51 K/UL (ref 1.8–8)
NEUTS SEG NFR BLD: 52.3 % (ref 32–75)
NITRITE UR QL STRIP.AUTO: NEGATIVE
NRBC # BLD: 0 K/UL (ref 0–0.01)
NRBC BLD-RTO: 0 PER 100 WBC
PH UR STRIP: 7 (ref 5–8)
PLATELET # BLD AUTO: 353 K/UL (ref 150–400)
PMV BLD AUTO: 9.2 FL (ref 8.9–12.9)
POTASSIUM SERPL-SCNC: 4 MMOL/L (ref 3.5–5.1)
PROT SERPL-MCNC: 7.2 G/DL (ref 6.4–8.2)
PROT UR STRIP-MCNC: NEGATIVE MG/DL
PROTHROMBIN TIME: 9.9 SEC (ref 9.2–11.2)
RBC # BLD AUTO: 4.04 M/UL (ref 3.8–5.2)
RBC #/AREA URNS HPF: ABNORMAL /HPF (ref 0–5)
SERVICE CMNT-IMP: NORMAL
SODIUM SERPL-SCNC: 142 MMOL/L (ref 136–145)
SP GR UR REFRACTOMETRY: 1.01 (ref 1–1.03)
SPECIMEN HOLD: NORMAL
TROPONIN I SERPL HS-MCNC: 33 NG/L (ref 0–51)
UROBILINOGEN UR QL STRIP.AUTO: 0.2 EU/DL (ref 0.2–1)
WBC # BLD AUTO: 4.8 K/UL (ref 3.6–11)
WBC URNS QL MICRO: ABNORMAL /HPF (ref 0–4)

## 2025-03-14 PROCEDURE — 0042T CT BRAIN PERFUSION: CPT

## 2025-03-14 PROCEDURE — 70450 CT HEAD/BRAIN W/O DYE: CPT

## 2025-03-14 PROCEDURE — 70551 MRI BRAIN STEM W/O DYE: CPT

## 2025-03-14 PROCEDURE — G0378 HOSPITAL OBSERVATION PER HR: HCPCS

## 2025-03-14 PROCEDURE — 70496 CT ANGIOGRAPHY HEAD: CPT

## 2025-03-14 PROCEDURE — 84484 ASSAY OF TROPONIN QUANT: CPT

## 2025-03-14 PROCEDURE — 82962 GLUCOSE BLOOD TEST: CPT

## 2025-03-14 PROCEDURE — 71045 X-RAY EXAM CHEST 1 VIEW: CPT

## 2025-03-14 PROCEDURE — 36415 COLL VENOUS BLD VENIPUNCTURE: CPT

## 2025-03-14 PROCEDURE — 81001 URINALYSIS AUTO W/SCOPE: CPT

## 2025-03-14 PROCEDURE — 85025 COMPLETE CBC W/AUTO DIFF WBC: CPT

## 2025-03-14 PROCEDURE — 85610 PROTHROMBIN TIME: CPT

## 2025-03-14 PROCEDURE — 6370000000 HC RX 637 (ALT 250 FOR IP): Performed by: STUDENT IN AN ORGANIZED HEALTH CARE EDUCATION/TRAINING PROGRAM

## 2025-03-14 PROCEDURE — 99285 EMERGENCY DEPT VISIT HI MDM: CPT

## 2025-03-14 PROCEDURE — 93005 ELECTROCARDIOGRAM TRACING: CPT | Performed by: STUDENT IN AN ORGANIZED HEALTH CARE EDUCATION/TRAINING PROGRAM

## 2025-03-14 PROCEDURE — 6360000004 HC RX CONTRAST MEDICATION: Performed by: STUDENT IN AN ORGANIZED HEALTH CARE EDUCATION/TRAINING PROGRAM

## 2025-03-14 PROCEDURE — 80053 COMPREHEN METABOLIC PANEL: CPT

## 2025-03-14 RX ORDER — CLOPIDOGREL 300 MG/1
300 TABLET, FILM COATED ORAL ONCE
Status: COMPLETED | OUTPATIENT
Start: 2025-03-14 | End: 2025-03-14

## 2025-03-14 RX ORDER — IOPAMIDOL 755 MG/ML
100 INJECTION, SOLUTION INTRAVASCULAR
Status: COMPLETED | OUTPATIENT
Start: 2025-03-14 | End: 2025-03-14

## 2025-03-14 RX ADMIN — IOPAMIDOL 100 ML: 755 INJECTION, SOLUTION INTRAVENOUS at 16:24

## 2025-03-14 RX ADMIN — CLOPIDOGREL BISULFATE 300 MG: 300 TABLET, FILM COATED ORAL at 20:32

## 2025-03-14 ASSESSMENT — LIFESTYLE VARIABLES
HOW OFTEN DO YOU HAVE A DRINK CONTAINING ALCOHOL: NEVER
HOW MANY STANDARD DRINKS CONTAINING ALCOHOL DO YOU HAVE ON A TYPICAL DAY: PATIENT DOES NOT DRINK

## 2025-03-14 NOTE — ED PROVIDER NOTES
ordered.  ECG/medicine tests: ordered and independent interpretation performed. Decision-making details documented in ED Course.    Risk  Prescription drug management.  Decision regarding hospitalization.        Total critical care time (not including time spent performing separately reportable procedures): 35 minutes.    Procedures       DISPOSITION: Decision To Admit 03/14/2025 06:50:35 PM    Perfect Serve Consult for Admission  7:48 PM    ED Room Number: SER01/01  Patient Name and age:  Gaby Stroud 65 y.o.  female  Working Diagnosis:   1. Cerebrovascular accident (CVA), unspecified mechanism (HCC)        COVID-19 Suspicion: No  Sepsis present:  No  Reassessment needed: No  Code Status:  Full Code  Readmission: No  Isolation Requirements: no  Recommended Level of Care: telemetry  Department: [facility] - [phone]    Other: Patient presented to ED with right facial droop worsened from baseline as well as slurred speech.  Level One code stroke called.  No LVO.  Patient generally back to baseline now.  Teleneurology recommends admit for MRI, starting Plavix 300 and continuing Plavix as well as further evaluation by neurology    Venancio Gabriel MD   Emergency Medicine Attending Physician      Venancio Gabriel MD  03/14/25 5629

## 2025-03-14 NOTE — ED TRIAGE NOTES
Patient arrives with c/o slurred speech and falling to there right since an hour ago. Patient had a stroke 1/26 with right side deficit.   Last known normal 315pm.  BG 95  On ASA daily.

## 2025-03-14 NOTE — ED NOTES
Teleneuro paged for level 1 code stroke, van negative @ [phone] paged @ 0342  CT notified @ 1609

## 2025-03-15 PROBLEM — I63.9 ACUTE CEREBROVASCULAR ACCIDENT (CVA) (HCC): Status: ACTIVE | Noted: 2025-03-15

## 2025-03-15 LAB
CHOLEST SERPL-MCNC: 148 MG/DL
EST. AVERAGE GLUCOSE BLD GHB EST-MCNC: 114 MG/DL
HBA1C MFR BLD: 5.6 % (ref 4–5.6)
HDLC SERPL-MCNC: 82 MG/DL
HDLC SERPL: 1.8 (ref 0–5)
LDLC SERPL CALC-MCNC: 59.2 MG/DL (ref 0–100)
TRIGL SERPL-MCNC: 34 MG/DL
VLDLC SERPL CALC-MCNC: 6.8 MG/DL

## 2025-03-15 PROCEDURE — 97165 OT EVAL LOW COMPLEX 30 MIN: CPT

## 2025-03-15 PROCEDURE — 97530 THERAPEUTIC ACTIVITIES: CPT

## 2025-03-15 PROCEDURE — 6370000000 HC RX 637 (ALT 250 FOR IP): Performed by: STUDENT IN AN ORGANIZED HEALTH CARE EDUCATION/TRAINING PROGRAM

## 2025-03-15 PROCEDURE — 2500000003 HC RX 250 WO HCPCS: Performed by: STUDENT IN AN ORGANIZED HEALTH CARE EDUCATION/TRAINING PROGRAM

## 2025-03-15 PROCEDURE — 83036 HEMOGLOBIN GLYCOSYLATED A1C: CPT

## 2025-03-15 PROCEDURE — 97535 SELF CARE MNGMENT TRAINING: CPT

## 2025-03-15 PROCEDURE — 2060000000 HC ICU INTERMEDIATE R&B

## 2025-03-15 PROCEDURE — 99223 1ST HOSP IP/OBS HIGH 75: CPT | Performed by: STUDENT IN AN ORGANIZED HEALTH CARE EDUCATION/TRAINING PROGRAM

## 2025-03-15 PROCEDURE — 97161 PT EVAL LOW COMPLEX 20 MIN: CPT

## 2025-03-15 PROCEDURE — 2580000003 HC RX 258: Performed by: STUDENT IN AN ORGANIZED HEALTH CARE EDUCATION/TRAINING PROGRAM

## 2025-03-15 PROCEDURE — 80061 LIPID PANEL: CPT

## 2025-03-15 RX ORDER — VENLAFAXINE HYDROCHLORIDE 150 MG/1
150 CAPSULE, EXTENDED RELEASE ORAL DAILY
Status: DISCONTINUED | OUTPATIENT
Start: 2025-03-16 | End: 2025-03-15

## 2025-03-15 RX ORDER — ATORVASTATIN CALCIUM 40 MG/1
80 TABLET, FILM COATED ORAL DAILY
Status: DISCONTINUED | OUTPATIENT
Start: 2025-03-15 | End: 2025-03-17 | Stop reason: HOSPADM

## 2025-03-15 RX ORDER — CLOPIDOGREL BISULFATE 75 MG/1
75 TABLET ORAL DAILY
Status: DISCONTINUED | OUTPATIENT
Start: 2025-03-15 | End: 2025-03-15

## 2025-03-15 RX ORDER — FOLIC ACID 1 MG/1
1 TABLET ORAL DAILY
COMMUNITY

## 2025-03-15 RX ORDER — VENLAFAXINE HYDROCHLORIDE 150 MG/1
150 CAPSULE, EXTENDED RELEASE ORAL DAILY
Status: DISCONTINUED | OUTPATIENT
Start: 2025-03-15 | End: 2025-03-17 | Stop reason: HOSPADM

## 2025-03-15 RX ORDER — CLOPIDOGREL BISULFATE 75 MG/1
75 TABLET ORAL DAILY
Status: DISCONTINUED | OUTPATIENT
Start: 2025-03-15 | End: 2025-03-17 | Stop reason: HOSPADM

## 2025-03-15 RX ORDER — SODIUM CHLORIDE 0.9 % (FLUSH) 0.9 %
5-40 SYRINGE (ML) INJECTION PRN
Status: DISCONTINUED | OUTPATIENT
Start: 2025-03-15 | End: 2025-03-17 | Stop reason: HOSPADM

## 2025-03-15 RX ORDER — BACLOFEN 10 MG/1
10 TABLET ORAL 3 TIMES DAILY
Status: DISCONTINUED | OUTPATIENT
Start: 2025-03-15 | End: 2025-03-17 | Stop reason: HOSPADM

## 2025-03-15 RX ORDER — BACLOFEN 10 MG/1
10 TABLET ORAL 3 TIMES DAILY
COMMUNITY

## 2025-03-15 RX ORDER — SODIUM CHLORIDE 9 MG/ML
INJECTION, SOLUTION INTRAVENOUS PRN
Status: DISCONTINUED | OUTPATIENT
Start: 2025-03-15 | End: 2025-03-17 | Stop reason: HOSPADM

## 2025-03-15 RX ORDER — ATORVASTATIN CALCIUM 40 MG/1
40 TABLET, FILM COATED ORAL NIGHTLY
Status: DISCONTINUED | OUTPATIENT
Start: 2025-03-16 | End: 2025-03-15

## 2025-03-15 RX ORDER — THIAMINE MONONITRATE (VIT B1) 100 MG
100 TABLET ORAL DAILY
COMMUNITY

## 2025-03-15 RX ORDER — POLYETHYLENE GLYCOL 3350 17 G/17G
17 POWDER, FOR SOLUTION ORAL DAILY PRN
Status: DISCONTINUED | OUTPATIENT
Start: 2025-03-15 | End: 2025-03-17 | Stop reason: HOSPADM

## 2025-03-15 RX ORDER — SODIUM CHLORIDE 0.9 % (FLUSH) 0.9 %
5-40 SYRINGE (ML) INJECTION EVERY 12 HOURS SCHEDULED
Status: DISCONTINUED | OUTPATIENT
Start: 2025-03-15 | End: 2025-03-17 | Stop reason: HOSPADM

## 2025-03-15 RX ORDER — ATORVASTATIN CALCIUM 40 MG/1
80 TABLET, FILM COATED ORAL NIGHTLY
Status: DISCONTINUED | OUTPATIENT
Start: 2025-03-16 | End: 2025-03-15

## 2025-03-15 RX ORDER — M-VIT,TX,IRON,MINS/CALC/FOLIC 27MG-0.4MG
1 TABLET ORAL DAILY
COMMUNITY

## 2025-03-15 RX ORDER — TIZANIDINE 2 MG/1
2 TABLET ORAL EVERY 6 HOURS PRN
COMMUNITY

## 2025-03-15 RX ORDER — SODIUM CHLORIDE, SODIUM LACTATE, POTASSIUM CHLORIDE, AND CALCIUM CHLORIDE .6; .31; .03; .02 G/100ML; G/100ML; G/100ML; G/100ML
500 INJECTION, SOLUTION INTRAVENOUS ONCE
Status: COMPLETED | OUTPATIENT
Start: 2025-03-15 | End: 2025-03-15

## 2025-03-15 RX ORDER — ASPIRIN 81 MG/1
81 TABLET, CHEWABLE ORAL DAILY
Status: DISCONTINUED | OUTPATIENT
Start: 2025-03-16 | End: 2025-03-17 | Stop reason: HOSPADM

## 2025-03-15 RX ORDER — AMLODIPINE BESYLATE 5 MG/1
10 TABLET ORAL DAILY
Status: DISCONTINUED | OUTPATIENT
Start: 2025-03-16 | End: 2025-03-17 | Stop reason: HOSPADM

## 2025-03-15 RX ORDER — FOLIC ACID 1 MG/1
1 TABLET ORAL DAILY
Status: DISCONTINUED | OUTPATIENT
Start: 2025-03-15 | End: 2025-03-17 | Stop reason: HOSPADM

## 2025-03-15 RX ORDER — ONDANSETRON 4 MG/1
4 TABLET, ORALLY DISINTEGRATING ORAL EVERY 8 HOURS PRN
Status: DISCONTINUED | OUTPATIENT
Start: 2025-03-15 | End: 2025-03-17 | Stop reason: HOSPADM

## 2025-03-15 RX ORDER — ONDANSETRON 2 MG/ML
4 INJECTION INTRAMUSCULAR; INTRAVENOUS EVERY 6 HOURS PRN
Status: DISCONTINUED | OUTPATIENT
Start: 2025-03-15 | End: 2025-03-17 | Stop reason: HOSPADM

## 2025-03-15 RX ADMIN — CLOPIDOGREL BISULFATE 75 MG: 75 TABLET ORAL at 14:38

## 2025-03-15 RX ADMIN — SODIUM CHLORIDE, PRESERVATIVE FREE 10 ML: 5 INJECTION INTRAVENOUS at 21:26

## 2025-03-15 RX ADMIN — SODIUM CHLORIDE, SODIUM LACTATE, POTASSIUM CHLORIDE, AND CALCIUM CHLORIDE 500 ML: .6; .31; .03; .02 INJECTION, SOLUTION INTRAVENOUS at 09:04

## 2025-03-15 RX ADMIN — BACLOFEN 10 MG: 10 TABLET ORAL at 14:38

## 2025-03-15 RX ADMIN — TIZANIDINE 2 MG: 4 TABLET ORAL at 14:43

## 2025-03-15 RX ADMIN — SODIUM CHLORIDE, PRESERVATIVE FREE 10 ML: 5 INJECTION INTRAVENOUS at 08:54

## 2025-03-15 RX ADMIN — BACLOFEN 10 MG: 10 TABLET ORAL at 10:14

## 2025-03-15 RX ADMIN — ATORVASTATIN CALCIUM 80 MG: 40 TABLET, FILM COATED ORAL at 11:14

## 2025-03-15 RX ADMIN — BACLOFEN 10 MG: 10 TABLET ORAL at 21:25

## 2025-03-15 RX ADMIN — VENLAFAXINE HYDROCHLORIDE 150 MG: 150 CAPSULE, EXTENDED RELEASE ORAL at 10:14

## 2025-03-15 RX ADMIN — Medication 1 MG: at 08:55

## 2025-03-15 ASSESSMENT — PAIN SCALES - GENERAL
PAINLEVEL_OUTOF10: 0

## 2025-03-15 NOTE — ED NOTES
TRANSFER - OUT REPORT:    Verbal report given to Brit on Gaby Stroud  being transferred to [facility] 6S 662 for routine progression of patient care       Report consisted of patient's Situation, Background, Assessment and   Recommendations(SBAR).     Information from the following report(s) Nurse Handoff Report, ED Encounter Summary, ED SBAR, MAR, Recent Results, Med Rec Status, Cardiac Rhythm SR, and Neuro Assessment was reviewed with the receiving nurse.    [place] Fall Assessment:    Presents to emergency department  because of falls (Syncope, seizure, or loss of consciousness): No  Age > 70: No  Altered Mental Status, Intoxication with alcohol or substance confusion (Disorientation, impaired judgment, poor safety awaremess, or inability to follow instructions): No  Impaired Mobility: Ambulates or transfers with assistive devices or assistance; Unable to ambulate or transer.: Yes  Nursing Judgement: Yes          Lines:   Peripheral IV 03/14/25 Left Antecubital (Active)   Site Assessment Clean, dry & intact 03/14/25 1702   Line Status Blood return noted 03/14/25 1702   Phlebitis Assessment No symptoms 03/14/25 1702   Infiltration Assessment 0 03/14/25 1702        Opportunity for questions and clarification was provided.      Patient transported with:  Monitor and Tech, DAQUAN ETA 0110

## 2025-03-15 NOTE — H&P
History & Physical    Primary Care Provider: Vignesh Michelle MD  Source of Information: Patient and chart review    History of Presenting Illness:   Gaby Stroud is a 65 y.o. female with history of CVA, history of ACOM aneurysm, hypertension, major depressive disorder, history of alcohol abuse.  Pending her usual state of health until she noted RIGHT facial droop and slurred speech.  She attempted to ambulate and noted some slight weakness in her RIGHT side.  No reported falls or head injury.  This prompted her to seek emergency room care.  Known history of CVA with recent admission 1 month ago at [facility] for moderate infarct in left corona radiata.  Chronic right-sided weakness, right upper extremity contracture and right facial droop secondary to previous CVAs    The patient denies any fever, chills, chest or abdominal pain, nausea, vomiting, cough, congestion, recent illness, palpitations, or dysuria.  Remarkable vitals on ER Presentation: BP to 170/93  Labs Remarkable for: Unknown  ER Images: MRI brain: Small acute/subacute left thalamic infarction.  Moderate, chronic left basal ganglia infarction.  ER Rx: Plavix 300 mL     Review of Systems:  Pertinent items are noted in the History of Present Illness.     Past Medical History:   Diagnosis Date    Psychiatric disorder     depresssion       Past Surgical History:   Procedure Laterality Date    BRAIN ANEURYSM SURGERY      BREAST BIOPSY Right     neg    BREAST BIOPSY Left     X 3; all neg    EP DEVICE PROCEDURE N/A 2025    Loop recorder insert performed by Ronna Gillette MD at [facility] CARDIAC CATH LAB    GYN           Prior to Admission medications    Medication Sig Start Date End Date Taking? Authorizing Provider   baclofen (LIORESAL) 10 MG tablet Take 1 tablet by mouth 3 times daily   Yes Cristy Ray MD   tiZANidine (ZANAFLEX) 2 MG tablet Take 1 tablet by mouth every 6 hours as needed   Yes Cristy Ray MD   vitamin B-1 (THIAMINE) 100 MG

## 2025-03-16 LAB
ANION GAP SERPL CALC-SCNC: 5 MMOL/L (ref 2–12)
BASOPHILS # BLD: 0.03 K/UL (ref 0–0.1)
BASOPHILS NFR BLD: 0.7 % (ref 0–1)
BUN SERPL-MCNC: 14 MG/DL (ref 6–20)
BUN/CREAT SERPL: 21 (ref 12–20)
CALCIUM SERPL-MCNC: 9.6 MG/DL (ref 8.5–10.1)
CHLORIDE SERPL-SCNC: 108 MMOL/L (ref 97–108)
CO2 SERPL-SCNC: 29 MMOL/L (ref 21–32)
CREAT SERPL-MCNC: 0.67 MG/DL (ref 0.55–1.02)
DIFFERENTIAL METHOD BLD: ABNORMAL
EOSINOPHIL # BLD: 0.08 K/UL (ref 0–0.4)
EOSINOPHIL NFR BLD: 1.8 % (ref 0–7)
ERYTHROCYTE [DISTWIDTH] IN BLOOD BY AUTOMATED COUNT: 12.7 % (ref 11.5–14.5)
GLUCOSE SERPL-MCNC: 99 MG/DL (ref 65–100)
HCT VFR BLD AUTO: 38.8 % (ref 35–47)
HGB BLD-MCNC: 12.6 G/DL (ref 11.5–16)
IMM GRANULOCYTES # BLD AUTO: 0.01 K/UL (ref 0–0.04)
IMM GRANULOCYTES NFR BLD AUTO: 0.2 % (ref 0–0.5)
LYMPHOCYTES # BLD: 1.48 K/UL (ref 0.8–3.5)
LYMPHOCYTES NFR BLD: 33.1 % (ref 12–49)
MCH RBC QN AUTO: 31.7 PG (ref 26–34)
MCHC RBC AUTO-ENTMCNC: 32.5 G/DL (ref 30–36.5)
MCV RBC AUTO: 97.5 FL (ref 80–99)
MONOCYTES # BLD: 0.66 K/UL (ref 0–1)
MONOCYTES NFR BLD: 14.8 % (ref 5–13)
NEUTS SEG # BLD: 2.21 K/UL (ref 1.8–8)
NEUTS SEG NFR BLD: 49.4 % (ref 32–75)
NRBC # BLD: 0 K/UL (ref 0–0.01)
NRBC BLD-RTO: 0 PER 100 WBC
PLATELET # BLD AUTO: 367 K/UL (ref 150–400)
PMV BLD AUTO: 9.4 FL (ref 8.9–12.9)
POTASSIUM SERPL-SCNC: 4 MMOL/L (ref 3.5–5.1)
RBC # BLD AUTO: 3.98 M/UL (ref 3.8–5.2)
SODIUM SERPL-SCNC: 142 MMOL/L (ref 136–145)
WBC # BLD AUTO: 4.5 K/UL (ref 3.6–11)

## 2025-03-16 PROCEDURE — 80048 BASIC METABOLIC PNL TOTAL CA: CPT

## 2025-03-16 PROCEDURE — 2500000003 HC RX 250 WO HCPCS: Performed by: STUDENT IN AN ORGANIZED HEALTH CARE EDUCATION/TRAINING PROGRAM

## 2025-03-16 PROCEDURE — 6370000000 HC RX 637 (ALT 250 FOR IP): Performed by: STUDENT IN AN ORGANIZED HEALTH CARE EDUCATION/TRAINING PROGRAM

## 2025-03-16 PROCEDURE — 92523 SPEECH SOUND LANG COMPREHEN: CPT

## 2025-03-16 PROCEDURE — 2060000000 HC ICU INTERMEDIATE R&B

## 2025-03-16 PROCEDURE — 85025 COMPLETE CBC W/AUTO DIFF WBC: CPT

## 2025-03-16 RX ADMIN — TIZANIDINE 2 MG: 4 TABLET ORAL at 13:51

## 2025-03-16 RX ADMIN — SODIUM CHLORIDE, PRESERVATIVE FREE 10 ML: 5 INJECTION INTRAVENOUS at 22:31

## 2025-03-16 RX ADMIN — Medication 1 MG: at 08:53

## 2025-03-16 RX ADMIN — CLOPIDOGREL BISULFATE 75 MG: 75 TABLET ORAL at 08:54

## 2025-03-16 RX ADMIN — AMLODIPINE BESYLATE 10 MG: 5 TABLET ORAL at 08:53

## 2025-03-16 RX ADMIN — ASPIRIN 81 MG CHEWABLE TABLET 81 MG: 81 TABLET CHEWABLE at 08:54

## 2025-03-16 RX ADMIN — BACLOFEN 10 MG: 10 TABLET ORAL at 08:54

## 2025-03-16 RX ADMIN — BACLOFEN 10 MG: 10 TABLET ORAL at 13:51

## 2025-03-16 RX ADMIN — VENLAFAXINE HYDROCHLORIDE 150 MG: 150 CAPSULE, EXTENDED RELEASE ORAL at 08:54

## 2025-03-16 RX ADMIN — BACLOFEN 10 MG: 10 TABLET ORAL at 22:30

## 2025-03-16 RX ADMIN — TIZANIDINE 2 MG: 4 TABLET ORAL at 08:54

## 2025-03-16 RX ADMIN — ATORVASTATIN CALCIUM 80 MG: 40 TABLET, FILM COATED ORAL at 08:56

## 2025-03-16 RX ADMIN — SODIUM CHLORIDE, PRESERVATIVE FREE 10 ML: 5 INJECTION INTRAVENOUS at 08:56

## 2025-03-16 ASSESSMENT — PAIN SCALES - GENERAL
PAINLEVEL_OUTOF10: 0
PAINLEVEL_OUTOF10: 0

## 2025-03-16 NOTE — CARE COORDINATION
Care Management Initial Assessment       RUR: 16%  Readmission? No  1st IM letter given? No  1st  letter given: No    Cm reviewed chart and received consult for inpatient rehab.  Pt was admitted back in January for a CVA and was sent to [facility] for inpatient rehab.  Pt completed rehab and was discharged home with her significant other.  They are in a 1 level apartment with a ramp to enter.  Pt uses a cane and/or gustavo wheelchair to assist with her mobility.  Pt was still attending outpatient therapy sessions.  CM sent referral to [facility] to review for another admission.  CM will continue to follow.       03/16/25 1022   Service Assessment   Patient Orientation Alert and Oriented   Cognition Alert   History Provided By Patient   Primary Caregiver Self   Support Systems Spouse/Significant Other   Patient's Healthcare Decision Maker is: Legal Next of Kin   PCP Verified by CM Yes   Prior Functional Level Assistance with the following:;Mobility;Cooking;Housework;Shopping   Current Functional Level Assistance with the following:;Cooking;Housework;Shopping;Mobility   Can patient return to prior living arrangement Unknown at present   Ability to make needs known: Fair   Family able to assist with home care needs: Yes   Social/Functional History   Lives With Significant other   Type of Home Apartment   Home Layout One level   Home Access Ramped entrance   Home Equipment Cane;Wheelchair - Manual

## 2025-03-17 VITALS
RESPIRATION RATE: 19 BRPM | WEIGHT: 141.3 LBS | BODY MASS INDEX: 25.84 KG/M2 | TEMPERATURE: 97.8 F | SYSTOLIC BLOOD PRESSURE: 142 MMHG | OXYGEN SATURATION: 95 % | HEART RATE: 112 BPM | DIASTOLIC BLOOD PRESSURE: 94 MMHG

## 2025-03-17 PROCEDURE — 2500000003 HC RX 250 WO HCPCS: Performed by: STUDENT IN AN ORGANIZED HEALTH CARE EDUCATION/TRAINING PROGRAM

## 2025-03-17 PROCEDURE — 6370000000 HC RX 637 (ALT 250 FOR IP): Performed by: STUDENT IN AN ORGANIZED HEALTH CARE EDUCATION/TRAINING PROGRAM

## 2025-03-17 PROCEDURE — 97530 THERAPEUTIC ACTIVITIES: CPT

## 2025-03-17 PROCEDURE — 97116 GAIT TRAINING THERAPY: CPT

## 2025-03-17 RX ORDER — ATORVASTATIN CALCIUM 80 MG/1
80 TABLET, FILM COATED ORAL DAILY
DISCHARGE
Start: 2025-03-18

## 2025-03-17 RX ORDER — CLOPIDOGREL BISULFATE 75 MG/1
75 TABLET ORAL DAILY
DISCHARGE
Start: 2025-03-18 | End: 2025-04-04

## 2025-03-17 RX ADMIN — BACLOFEN 10 MG: 10 TABLET ORAL at 13:17

## 2025-03-17 RX ADMIN — CLOPIDOGREL BISULFATE 75 MG: 75 TABLET ORAL at 09:13

## 2025-03-17 RX ADMIN — VENLAFAXINE HYDROCHLORIDE 150 MG: 150 CAPSULE, EXTENDED RELEASE ORAL at 09:13

## 2025-03-17 RX ADMIN — AMLODIPINE BESYLATE 10 MG: 5 TABLET ORAL at 09:13

## 2025-03-17 RX ADMIN — BACLOFEN 10 MG: 10 TABLET ORAL at 09:13

## 2025-03-17 RX ADMIN — TIZANIDINE 2 MG: 4 TABLET ORAL at 09:15

## 2025-03-17 RX ADMIN — Medication 1 MG: at 09:13

## 2025-03-17 RX ADMIN — ASPIRIN 81 MG CHEWABLE TABLET 81 MG: 81 TABLET CHEWABLE at 09:13

## 2025-03-17 RX ADMIN — SODIUM CHLORIDE, PRESERVATIVE FREE 10 ML: 5 INJECTION INTRAVENOUS at 09:14

## 2025-03-17 RX ADMIN — ATORVASTATIN CALCIUM 80 MG: 40 TABLET, FILM COATED ORAL at 09:15

## 2025-03-17 NOTE — CARE COORDINATION
Transition of Care Plan:    IPR - [facility] - Auth received/DC today  RN to call report to [phone]; Room 3060    Transport: Family 6pm    RUR: 17%  Prior Level of Functioning: requires assist  Disposition: IPR  ANGELA: 3/18  If SNF or IPR: Date FOC offered: 3/16  Date FOC received: 3/16  Accepting facility: [facility]  Date authorization started with reference number: 3/17  Date authorization received and expires:   Follow up appointments: pcp, Neuro   DME needed: defer to rehab   Transportation at discharge: S  Caregiver Contact: Adam Stroud (Child)  [phone] (Mobile)   Discharge Caregiver contacted prior to discharge?   Care Conference needed? No  Barriers to discharge: none    [facility] has accepted pt and initiated insurance auth through [employer] today. Update provided to Pt and family member at bedside.    2:30pm: Pt has received insurance auth for IPR at [facility] - they do have a bed available today after 6pm. CM informed Pt, she prefers for family to transport as Pt does not have a stretcher benefit.     Susan Patel M.S (Ally).FELIPE.

## 2025-03-17 NOTE — DISCHARGE SUMMARY
Discharge Summary       PATIENT ID: Gaby Stroud  MRN: [id number]   YOB: 1960    DATE OF ADMISSION: 3/14/2025  4:09 PM    DATE OF DISCHARGE: 3/17/25   PRIMARY CARE PROVIDER: Vignesh Michelle MD     ATTENDING PHYSICIAN: Jered Cosme MD  DISCHARGING PROVIDER: Jered Cosme MD    To contact this individual call [phone] and ask the  to page.  If unavailable ask to be transferred the Adult Hospitalist Department.    CONSULTATIONS: IP CONSULT TO TELE-NEUROLOGY  IP CONSULT TO NEUROLOGY  IP CONSULT TO CASE MANAGEMENT  IP CONSULT TO CASE MANAGEMENT    PROCEDURES/SURGERIES: * No surgery found *    ADMITTING DIAGNOSES & HOSPITAL COURSE:   Gaby Stroud is a 65 y.o. female with history of CVA, history of ACOM aneurysm, hypertension, major depressive disorder, history of alcohol abuse.  Pending her usual state of health until she noted RIGHT facial droop and slurred speech.  She attempted to ambulate and noted some slight weakness in her RIGHT side.  No reported falls or head injury.  This prompted her to seek emergency room care.  Known history of CVA with recent admission 1 month ago at [facility] for moderate infarct in left corona radiata.  Chronic right-sided weakness, right upper extremity contracture and right facial droop secondary to previous CVAs     The patient denies any fever, chills, chest or abdominal pain, nausea, vomiting, cough, congestion, recent illness, palpitations, or dysuria.  Remarkable vitals on ER Presentation: BP to 170/93  Labs Remarkable for: Unknown  ER Images: MRI brain: Small acute/subacute left thalamic infarction.  Moderate, chronic left basal ganglia infarction.  ER Rx: Plavix 300 mL    Acute CVA  -mri brain: Small, acute or subacute, left thalamic infarction. Moderate, chronic, left basal ganglial infarction.  -Recurrent cva  - ASA, Plavix load  - TTE with bubble study   - Lipid panel, TSH, ESR/CRP, cardiac enzymes   - Check A1C, fingersticks and ISS

## 2025-03-17 NOTE — PROGRESS NOTES
I have read and agree with Allie Blackmon RN charting.   
I have read and agree with Allie Blackmon RN's charting.   
Spiritual Health History and Assessment/Progress Note  [facility]    Rituals, Rites and Sacraments,  ,  ,      Name: Gaby Stroud MRN: [id number]    Age: 65 y.o.     Sex: female   Language: English   Rastafari: Christian   CVA (cerebrovascular accident due to intracerebral hemorrhage) (Formerly [facility])     Date: 3/17/2025            Total Time Calculated: 5 min              Spiritual Assessment began in [facility] 6S NEURO-SCI TELE        Referral/Consult From: Clergy/   Encounter Overview/Reason: Rituals, Rites and Sacraments  Service Provided For: Patient    Tamara, Belief, Meaning:   Patient is connected with a tamara tradition or spiritual practice  Family/Friends are connected with a tamara tradition or spiritual practice      Importance and Influence:  Patient has spiritual/personal beliefs that influence decisions regarding their health  Family/Friends have spiritual/personal beliefs that influence decisions regarding the patient's health    Community:  Patient is connected with a spiritual community  Family/Friends are connected with a spiritual community:    Assessment and Plan of Care:     Patient Interventions include: declined  Family/Friends Interventions include: Other: declined    Patient Plan of Care: Spiritual Care available upon further referral  Family/Friends Plan of Care: Spiritual Care available upon further referral    Electronically signed by Chaplain JOCELYN on 3/17/2025 at 12:02 PM     [facility]  Mrs. Stroud is Christian.  She remembered Sr. Kiran from previous admission.  She is declined communion today.   She is hoping to be able to go to [facility] for rehab before going home.  Prayer offered.    JOHN Ruano Sr., RN, ACSW, LCSW   Page:  [phone]   
Stroke Education provided to patient and the following topics were discussed    1. Patients personal risk factors for stroke are hypertension    2. Warning signs of Stroke:        * Sudden numbness or weakness of the face, arm or leg, especially on one side of          The body            * Sudden confusion, trouble speaking or understanding        * Sudden trouble seeing in one or both eyes        * Sudden trouble walking, dizziness, loss of balance or coordination        * Sudden severe headache with no known cause      3. Importance of activation Emergency Medical Services ( 9-1-1 ) immediately if experience any warning signs of stroke.    4. Be sure and schedule a follow-up appointment with your primary care doctor or any specialists as instructed.     5. You must take medicine every day to treat your risk factors for stroke.  Be sure to take your medicines exactly as your doctor tells you: no more, no less.  Know what your medicines are for , what they do.  Anti-thrombotics /anticoagulants can help prevent strokes.  You are taking the following medicine(s) on discharge papers.    6.  Smoking and second-hand smoke greatly increase your risk of stroke, cardiovascular disease and death. Smoking history never    7. Information provided was [facility] Stroke Education Binder, Stroke Handouts, or Verbal Education    8. Documentation of teaching completed in Patient Education Activity and on Care Plan with teaching response noted?  yes   
clinical/nonclinical/nursing services involved in patient's clinical care. Care coordination discussions were held with appropriate clinical/nonclinical/ nursing providers based on care coordination needs.     Labs:     Recent Labs     03/14/25  1626 03/16/25  0644   WBC 4.8 4.5   HGB 12.9 12.6   HCT 39.3 38.8    367     Recent Labs     03/14/25  1626 03/16/25  0644    142   K 4.0 4.0    108   CO2 30 29   BUN 16 14     Recent Labs     03/14/25  1626   ALT 36   GLOB 3.7     Recent Labs     03/14/25  1626   INR 1.0      No results for input(s): \"TIBC\" in the last 72 hours.    Invalid input(s): \"FE\", \"PSAT\", \"FERR\"   No results found for: \"RBCF\"   No results for input(s): \"PH\", \"PCO2\", \"PO2\" in the last 72 hours.  No results for input(s): \"CPK\" in the last 72 hours.    Invalid input(s): \"CPKMB\", \"CKNDX\", \"TROIQ\"  Lab Results   Component Value Date/Time    CHOL 148 03/15/2025 06:48 AM    HDL 82 03/15/2025 06:48 AM    LDL 59.2 03/15/2025 06:48 AM     No results found for: \"GLUCPOC\"        Medications Reviewed:     Current Facility-Administered Medications   Medication Dose Route Frequency    amLODIPine (NORVASC) tablet 10 mg  10 mg Oral Daily    aspirin chewable tablet 81 mg  81 mg Oral Daily    folic acid (FOLVITE) tablet 1 mg  1 mg Oral Daily    sodium chloride flush 0.9 % injection 5-40 mL  5-40 mL IntraVENous 2 times per day    sodium chloride flush 0.9 % injection 5-40 mL  5-40 mL IntraVENous PRN    0.9 % sodium chloride infusion   IntraVENous PRN    ondansetron (ZOFRAN-ODT) disintegrating tablet 4 mg  4 mg Oral Q8H PRN    Or    ondansetron (ZOFRAN) injection 4 mg  4 mg IntraVENous Q6H PRN    polyethylene glycol (GLYCOLAX) packet 17 g  17 g Oral Daily PRN    baclofen (LIORESAL) tablet 10 mg  10 mg Oral TID    tiZANidine (ZANAFLEX) tablet 2 mg  2 mg Oral Q6H PRN    venlafaxine (EFFEXOR XR) extended release capsule 150 mg  150 mg Oral Daily    atorvastatin (LIPITOR) tablet 80 mg  80 mg Oral Daily    
clopidogrel (PLAVIX) tablet 75 mg  75 mg Oral Daily     ______________________________________________________________________  EXPECTED LENGTH OF STAY: Unable to retrieve estimated LOS  ACTUAL LENGTH OF STAY:          1                 Jered Cosme MD    
estimated LOS  ACTUAL LENGTH OF STAY:          0                 Jered Cosme MD

## 2025-03-17 NOTE — PLAN OF CARE
Problem: Chronic Conditions and Co-morbidities  Goal: Patient's chronic conditions and co-morbidity symptoms are monitored and maintained or improved  3/17/2025 1018 by Roxie Hsu, RN  Outcome: Progressing  Flowsheets (Taken 3/17/2025 0800)  Care Plan - Patient's Chronic Conditions and Co-Morbidity Symptoms are Monitored and Maintained or Improved:   Monitor and assess patient's chronic conditions and comorbid symptoms for stability, deterioration, or improvement   Collaborate with multidisciplinary team to address chronic and comorbid conditions and prevent exacerbation or deterioration   Update acute care plan with appropriate goals if chronic or comorbid symptoms are exacerbated and prevent overall improvement and discharge  3/17/2025 0456 by Carmelina Rolle, RN  Outcome: Progressing  Flowsheets  Taken 3/17/2025 0456  Care Plan - Patient's Chronic Conditions and Co-Morbidity Symptoms are Monitored and Maintained or Improved: Monitor and assess patient's chronic conditions and comorbid symptoms for stability, deterioration, or improvement  Taken 3/16/2025 2000  Care Plan - Patient's Chronic Conditions and Co-Morbidity Symptoms are Monitored and Maintained or Improved: Monitor and assess patient's chronic conditions and comorbid symptoms for stability, deterioration, or improvement     Problem: Discharge Planning  Goal: Discharge to home or other facility with appropriate resources  3/17/2025 1018 by Roxie Hsu, RN  Outcome: Progressing  Flowsheets (Taken 3/17/2025 0800)  Discharge to home or other facility with appropriate resources:   Identify barriers to discharge with patient and caregiver   Arrange for needed discharge resources and transportation as appropriate   Identify discharge learning needs (meds, wound care, etc)   Arrange for interpreters to assist at discharge as needed   Refer to discharge planning if patient needs post-hospital services based on physician order or complex needs 
  Problem: Physical Therapy - Adult  Goal: By Discharge: Performs mobility at highest level of function for planned discharge setting.  See evaluation for individualized goals.  Description: FUNCTIONAL STATUS PRIOR TO ADMISSION: Pt was admitted from home; was going to OPPT working on ambulating short distances with SPC. Typically transfers John to w/c and uses w/c to mobilize around her home (currently staying at boyfriend's house).    HOME SUPPORT PRIOR TO ADMISSION: Pt currently staying with boyfriend who lives in one level apt with ramp access. Has assistance from him when entering home via ramp with use of SPC and gait belt, occasional assistance for ADLs.    Physical Therapy Goals  Initiated 3/15/2025  1.  Patient will move from supine to sit and sit to supine in bed with modified independence within 7 day(s).    2.  Patient will perform sit to stand with modified independence within 7 day(s).  3.  Patient will transfer from bed to chair and chair to bed with modified independence using the least restrictive device within 7 day(s).  4.  Patient will ambulate with contact guard assist for 25 feet with the least restrictive device within 7 day(s).   5.  Patient will improve Bhakta Balance score by 7 points within 7 days.   3/17/2025 1533 by Nichole Turcios, PT  Outcome: Progressing     
  Problem: Physical Therapy - Adult  Goal: By Discharge: Performs mobility at highest level of function for planned discharge setting.  See evaluation for individualized goals.  Description: FUNCTIONAL STATUS PRIOR TO ADMISSION: Pt was admitted from home; was going to OPPT working on ambulating short distances with SPC. Typically transfers John to w/c and uses w/c to mobilize around her home (currently staying at boyfriend's house).    HOME SUPPORT PRIOR TO ADMISSION: Pt currently staying with boyfriend who lives in one level apt with ramp access. Has assistance from him when entering home via ramp with use of SPC and gait belt, occasional assistance for ADLs.    Physical Therapy Goals  Initiated 3/15/2025  1.  Patient will move from supine to sit and sit to supine in bed with modified independence within 7 day(s).    2.  Patient will perform sit to stand with modified independence within 7 day(s).  3.  Patient will transfer from bed to chair and chair to bed with modified independence using the least restrictive device within 7 day(s).  4.  Patient will ambulate with contact guard assist for 25 feet with the least restrictive device within 7 day(s).   5.  Patient will improve Bhakta Balance score by 7 points within 7 days.   Outcome: Progressing    PHYSICAL THERAPY EVALUATION    Patient: Gaby Stroud (65 y.o. female)  Date: 3/15/2025  Primary Diagnosis: CVA (cerebrovascular accident due to intracerebral hemorrhage) (Formerly [facility]) [I61.9]  Cerebrovascular accident (CVA), unspecified mechanism ([facility]ly [facility]) [I63.9]  Acute cerebrovascular accident (CVA) (Formerly [facility]) [I63.9]       Precautions: Restrictions/Precautions  Restrictions/Precautions: Fall Risk            ASSESSMENT :   DEFICITS/IMPAIRMENTS:   The patient is limited by decreased functional mobility, independence in ADLs, ROM, strength, sensation, body mechanics, activity tolerance, endurance, balance.  Pt seen for PT evaluation after presenting to hospital with R facial droop and 
Speech LAnguage Pathology EVALUATION    Patient: Gaby Stroud (65 y.o. female)  Date: 3/16/2025  Primary Diagnosis: CVA (cerebrovascular accident due to intracerebral hemorrhage) (HCC) [I61.9]  Cerebrovascular accident (CVA), unspecified mechanism (HCC) [I63.9]  Acute cerebrovascular accident (CVA) (HCC) [I63.9]       Precautions:  Fall Risk                  ASSESSMENT :  This patient was admitted with acute L thalamic infarct and has chronic L BG infarct which occurred in January, '25. With first stroke she had mild word retrieval deficits and mild  dysarthria. She went to [facility] for acute inpt rehab then and is now still in OP therapies at [facility] OP clinic. Today she has very min dysarthria with imprecise artic. She could say tongue twisters with slower rate with good intelligibility. No word finding deficits noted in conversation or on naming tasks. In OP speech therapy she is still working on high level word finding tasks such as synonyms and antonyms.   Today she was noted to have significant R facial weakness and min deviation of her tongue to the R. She uses a finger sweep to clear any pocketed food. She reports tolerating a reg/thin liquid diet. She passed her [place] swallow screen.    Patient will benefit from skilled intervention to address the above impairments.     PLAN :  Recommendations and Planned Interventions:  Diet: Regular  thins     Recommend next SLP session: high level word finding and dysarthria tasks     Acute SLP Services: SLP Plan of Care: 2 times/week. Patient's rehabilitation potential is considered to be Good.  Discharge Recommendations: Yes, recommend SLP treatment at next level of care     SUBJECTIVE:   Patient stated, she thought her speech and language were at baseline since stroke in January. She has been at [facility] inpt and is still in OP speech, PT and OT. Min dysarthria and high level word finding deficits.     OBJECTIVE:     Past Medical History:   Diagnosis Date    Psychiatric disorder     
Performs self-care activities at highest level of function for planned discharge setting.  See evaluation for individualized goals.  Description: FUNCTIONAL STATUS PRIOR TO ADMISSION:  Patient was ambulatory for short, household distances using a cane and sometimes an AFO and was independent for basic ADLs, some assist for transfers and IADLs.     Receives Help From:  (s/o), Prior Level of Assist for ADLs: Independent, Prior Level of Assist for Homemaking: Needs assistance, Prior Level of Assist for Transfers: Needs assistance, Active : No      HOME SUPPORT: Patient lived with her significant other who assists with mobility as needed and IADLs.     Occupational Therapy Goals  Initiated 3/15/2025   1.  Patient will perform grooming standing at sink with Supervision within 7 day(s).  2.  Patient will perform lower body dressing with Minimal Assist within 7 day(s).  3.  Patient will perform bathing with Supervision within 7 day(s).  4.  Patient will perform toilet transfers with Supervision within 7 day(s).  5.  Patient will perform all aspects of toileting with Supervision within 7 day(s).  6.  Patient will participate in upper extremity therapeutic exercise/activities with [place] within 7 day(s).    7.  Patient will utilize energy conservation techniques during functional activities with verbal cues within 7 day(s).  8.  Patient will improve their Fugl Garnett score by 5 points in prep for ADLs within 7 days.  3/15/2025 1024 by Marylin Morfin OT  Outcome: Progressing     
family/caregiver on patient safety  Taken 3/15/2025 2000  Free From Fall Injury: Instruct family/caregiver on patient safety     
                                                                                                                                             Pain Ratin/10   Pain Intervention(s):       Activity Tolerance:   Good and requires rest breaks    After treatment:   Patient left in no apparent distress sitting up in chair, Call bell within reach, Bed/ chair alarm activated, and Updated patient's board on functional status and mobility recommendations      COMMUNICATION/EDUCATION:   The patient's plan of care was discussed with: registered nurse and     Patient Education  Education Given To: Patient  Education Provided: Role of Therapy;Plan of Care;Fall Prevention Strategies  Education Method: Verbal  Barriers to Learning: None  Education Outcome: Verbalized understanding      Nichole Turcios PT, DPT  Minutes: 28    
degrees): None  Shoulder Flexion ( degrees): None  Pronation/Supination: None  Subtotal: 0    Normal Reflex Activity  Biceps, Triceps, Finger Flexors: Partial  Subtotal: 1    Upper Extremity Total   Upper Extremity Total: 5    Wrist  Stability at 15 Degree Dorsiflexion: None  Repeated Dorsiflexion/ Volar Flexion: None  Stability at 15 Degree Dorsiflexion: None  Repeated Dorsiflexion/ Volar Flexion: None  Circumduction: None  Wrist Total: 0    Hand  Mass Flexion: None  Mass Extension: None  Grasp A: None  Grasp B: None  Grasp C: None  Grasp D: None  Grasp E: None  Hand Total: 0    Coordination/Speed  Tremor: Marked  Dysmetria: Marked  Time: >5s  Coordination/Speed Total: 0    Total A-D  Total A-D (Motor Function): 5         This is a reliable/valid measure of arm function after a neurological event. It has established value to characterize functional status and for measuring spontaneous and therapy-induced recovery; tests proximal and distal motor functions. Fugl-Garnett Assessment - UE scores recorded between five and 30 days post neurologic event can be used to predict UE recovery at six months post neurologic event.  Severe = 0-21 points   Moderately Severe = 22-33 points   Moderate = 34-47 points   Mild = 48-66 points  WESLEY Adams, ANNA Martinez, ESA Preston, BALBIR Marley, & ALBER Alegria (1992). Measurement of motor recovery after stroke: Outcome assessment and sample size requirements. Stroke, 23, pp. 8882-8383.   --------------------------------------------------------------------------------------------------------------------------------------------------------------------  MCID:  Stroke:   (Fco et al, 2001; n = 171; mean age 70 (11) years; assessed within 17 (12) days of stroke, Acute Stroke)  FMA Motor Scores from Admission to Discharge   10 point increase in FMA Upper Extremity = 1.5 change in discharge FIM   10 point increase in FMA Lower Extremity = 1.9 change in discharge FIM  MDC:   Stroke:

## 2025-04-13 PROCEDURE — 93298 REM INTERROG DEV EVAL SCRMS: CPT | Performed by: INTERNAL MEDICINE

## 2025-05-23 ENCOUNTER — OFFICE VISIT (OUTPATIENT)
Age: 65
End: 2025-05-23
Payer: COMMERCIAL

## 2025-05-23 VITALS
DIASTOLIC BLOOD PRESSURE: 86 MMHG | SYSTOLIC BLOOD PRESSURE: 124 MMHG | BODY MASS INDEX: 25.84 KG/M2 | OXYGEN SATURATION: 96 % | HEART RATE: 102 BPM | HEIGHT: 62 IN

## 2025-05-23 DIAGNOSIS — I10 PRIMARY HYPERTENSION: ICD-10-CM

## 2025-05-23 DIAGNOSIS — Z86.73 HISTORY OF STROKE: Primary | ICD-10-CM

## 2025-05-23 DIAGNOSIS — Z95.818 IMPLANTABLE LOOP RECORDER PRESENT: ICD-10-CM

## 2025-05-23 PROCEDURE — 3079F DIAST BP 80-89 MM HG: CPT

## 2025-05-23 PROCEDURE — 3074F SYST BP LT 130 MM HG: CPT

## 2025-05-23 PROCEDURE — 99213 OFFICE O/P EST LOW 20 MIN: CPT

## 2025-05-23 RX ORDER — LISINOPRIL 5 MG/1
TABLET ORAL
COMMUNITY
Start: 2025-03-28

## 2025-05-23 RX ORDER — SENNOSIDES A AND B 8.6 MG/1
TABLET, FILM COATED ORAL
COMMUNITY
Start: 2025-03-27

## 2025-05-23 NOTE — PROGRESS NOTES
1. Have you been to the ER, urgent care clinic since your last visit?  Hospitalized since your last visit?  3/14/25, [facility] CV[facility]    2. Have you seen or consulted any other health care providers outside of the [facility] since your last visit?  Include any pap smears or colon screening.   No  
AM                  ___________________________________________________    Rosemary Valenzuela NP    Cardiac Electrophysiology  [facility]   [address]  [place] [address] [phone]

## 2025-06-14 PROCEDURE — 93298 REM INTERROG DEV EVAL SCRMS: CPT | Performed by: INTERNAL MEDICINE

## 2025-06-19 ENCOUNTER — OFFICE VISIT (OUTPATIENT)
Age: 65
End: 2025-06-19
Payer: COMMERCIAL

## 2025-06-19 VITALS
BODY MASS INDEX: 24.84 KG/M2 | DIASTOLIC BLOOD PRESSURE: 72 MMHG | HEART RATE: 90 BPM | SYSTOLIC BLOOD PRESSURE: 120 MMHG | RESPIRATION RATE: 16 BRPM | WEIGHT: 135 LBS | HEIGHT: 62 IN | OXYGEN SATURATION: 98 %

## 2025-06-19 DIAGNOSIS — I69.351 SPASTIC HEMIPLEGIA OF RIGHT DOMINANT SIDE AS LATE EFFECT OF CEREBRAL INFARCTION (HCC): ICD-10-CM

## 2025-06-19 DIAGNOSIS — Z79.02 LONG TERM (CURRENT) USE OF ANTITHROMBOTICS/ANTIPLATELETS: ICD-10-CM

## 2025-06-19 DIAGNOSIS — Z86.73 HISTORY OF STROKE: ICD-10-CM

## 2025-06-19 DIAGNOSIS — Z09 HOSPITAL DISCHARGE FOLLOW-UP: Primary | ICD-10-CM

## 2025-06-19 PROCEDURE — 99215 OFFICE O/P EST HI 40 MIN: CPT

## 2025-06-19 PROCEDURE — 3074F SYST BP LT 130 MM HG: CPT

## 2025-06-19 PROCEDURE — 3078F DIAST BP <80 MM HG: CPT

## 2025-06-19 RX ORDER — ASPIRIN 81 MG/1
81 TABLET ORAL DAILY
Qty: 90 TABLET | Refills: 3 | Status: SHIPPED | OUTPATIENT
Start: 2025-06-19

## 2025-06-19 ASSESSMENT — ENCOUNTER SYMPTOMS
TROUBLE SWALLOWING: 0
VOICE CHANGE: 0

## 2025-06-19 ASSESSMENT — VISUAL ACUITY: VA_NORMAL: 1

## 2025-06-19 ASSESSMENT — PATIENT HEALTH QUESTIONNAIRE - PHQ9
1. LITTLE INTEREST OR PLEASURE IN DOING THINGS: NOT AT ALL
SUM OF ALL RESPONSES TO PHQ QUESTIONS 1-9: 0
2. FEELING DOWN, DEPRESSED OR HOPELESS: NOT AT ALL
SUM OF ALL RESPONSES TO PHQ QUESTIONS 1-9: 0

## 2025-06-19 NOTE — PROGRESS NOTES
[facility] at   [facility]   [address], [place] [address]         Hospital Follow Up    PATIENT ID:   Gaby Stroud  1960  65 y.o. female  [id number]    Chief Complaint   Patient presents with    Follow-up     Hospital Follow up  TIA  Ref by Dr. Stokes         Summary of Hospital Course:   DATE OF ADMISSION: 3/14/2025  DATE OF DISCHARGE: 3/17/25   Pmhx: HTN, HLD, h/o ACOM aneurysm, CVA with residual R sided weakness (1/2025), depression, alcohol abuse     Patient presented to the hospital with c/o worsening of residual right facial droop, right leg weakness. In the ED, CT head WO negative for acute intracranial pathology. CTA H/N negative for acute intracranial pathology or significant stenosis, showing stable aneurysm coil anterior to the ACOM artery and chronic infarctions in the deep left frontal white matter and right thalamus.  CT brain perfusion negative. Neurology was consulted for stroke w/u, seen by Dr Kent while inpatient. MRI brain WO 3/14/25 showed possible extension of L BG/corona radiata infarct in L thalamus. TTE done 1/26/25 during prior admission for stroke, not repeated in March, showed EF 58%, negative for thrombus, normal LV with diastolic dysfunction. HgbA1C 5.6%, LDL 59. Pt received Plavix 300 mg x 1 dose. Pt has ILR placed by Dr Gillette during January admission.  She was discharged with recommendations to continue ASA 81 mg and Plavix 75 mg qd x 20 days then stop Plavix and continue ASA as monotherapy, increase atorvastatin to 80 mg qd, no driving x 6 mos. Pt discharged to [facility].     Interval History:  Pt here today w/ sister. History obtained from both.     She has seen Physical Medicine and Rehabilitation provider at [facility] who continued the Plavix, pt asking when she can stop. Tolerating DAPT without bleeding/side effect, but bruises easily. She has stopped going to PT, OT, ST at [facility] last week because insurance has limited her

## 2025-07-15 PROCEDURE — 93298 REM INTERROG DEV EVAL SCRMS: CPT | Performed by: INTERNAL MEDICINE

## (undated) DEVICE — SYRINGE MED 20ML STD CLR PLAS LUERLOCK TIP N CTRL DISP

## (undated) DEVICE — SUTURE VICRYL COAT SZ 4-0 L18IN ABSRB UD L19MM PS-2 1/2 CIR J496G

## (undated) DEVICE — 3M™ TEGADERM™ TRANSPARENT FILM DRESSING FRAME STYLE, 1626W, 4 IN X 4-3/4 IN (10 CM X 12 CM), 50/CT 4CT/CASE: Brand: 3M™ TEGADERM™

## (undated) DEVICE — APPLICATOR MEDICATED 10.5 CC SOLUTION CLR STRL CHLORAPREP

## (undated) DEVICE — NEEDLE HYPO 25GA L1.5IN BVL ORIENTED ECLIPSE